# Patient Record
Sex: FEMALE | Race: WHITE | NOT HISPANIC OR LATINO | Employment: FULL TIME | URBAN - METROPOLITAN AREA
[De-identification: names, ages, dates, MRNs, and addresses within clinical notes are randomized per-mention and may not be internally consistent; named-entity substitution may affect disease eponyms.]

---

## 2017-04-12 ENCOUNTER — ALLSCRIPTS OFFICE VISIT (OUTPATIENT)
Dept: OTHER | Facility: OTHER | Age: 42
End: 2017-04-12

## 2017-04-27 ENCOUNTER — HOSPITAL ENCOUNTER (EMERGENCY)
Facility: HOSPITAL | Age: 42
Discharge: HOME | End: 2017-04-27
Attending: EMERGENCY MEDICINE
Payer: COMMERCIAL

## 2017-04-27 VITALS
TEMPERATURE: 98.3 F | DIASTOLIC BLOOD PRESSURE: 66 MMHG | WEIGHT: 293 LBS | SYSTOLIC BLOOD PRESSURE: 129 MMHG | RESPIRATION RATE: 18 BRPM | HEART RATE: 77 BPM | OXYGEN SATURATION: 98 %

## 2017-04-27 DIAGNOSIS — J02.9 PHARYNGITIS: Primary | ICD-10-CM

## 2017-04-27 DIAGNOSIS — J06.9 URI (UPPER RESPIRATORY INFECTION): ICD-10-CM

## 2017-04-27 PROCEDURE — 99282 EMERGENCY DEPT VISIT SF MDM: CPT

## 2017-04-27 RX ORDER — ACETAMINOPHEN 325 MG/1
975 TABLET ORAL ONCE
Status: COMPLETED | OUTPATIENT
Start: 2017-04-27 | End: 2017-04-27

## 2017-04-27 RX ORDER — CEFDINIR 300 MG/1
300 CAPSULE ORAL EVERY 12 HOURS
COMMUNITY
End: 2018-02-22

## 2017-04-27 RX ORDER — PROMETHAZINE HYDROCHLORIDE AND CODEINE PHOSPHATE 6.25; 1 MG/5ML; MG/5ML
5 SYRUP ORAL EVERY 6 HOURS PRN
Qty: 120 ML | Refills: 0 | Status: SHIPPED | OUTPATIENT
Start: 2017-04-27 | End: 2017-05-03

## 2017-04-27 RX ORDER — TRAMADOL HYDROCHLORIDE 50 MG/1
50 TABLET ORAL ONCE
Status: COMPLETED | OUTPATIENT
Start: 2017-04-27 | End: 2017-04-27

## 2017-04-27 RX ADMIN — TRAMADOL HYDROCHLORIDE 50 MG: 50 TABLET, COATED ORAL at 04:30

## 2017-04-27 RX ADMIN — ACETAMINOPHEN 975 MG: 325 TABLET ORAL at 04:30

## 2017-04-27 RX ADMIN — DEXAMETHASONE SODIUM PHOSPHATE 10 MG: 10 INJECTION, SOLUTION INTRAMUSCULAR; INTRAVENOUS at 04:32

## 2017-08-15 LAB
CREAT ?TM UR-SCNC: 129 UMOL/L
HBA1C MFR BLD HPLC: 8.4 %
MICROALBUMIN UR-MCNC: 1.4 MG/L (ref 0–20)
MICROALBUMIN/CREAT UR: 11 MG/G{CREAT}

## 2018-01-17 NOTE — MISCELLANEOUS
Provider Comments  Provider Comments:   PT WAS A NO SHOW FOR THE APPT      Signatures   Electronically signed by : Romana Prose, M D ; Apr 12 2017  3:13PM EST                       (Review)

## 2018-02-22 ENCOUNTER — OFFICE VISIT (OUTPATIENT)
Dept: ENDOCRINOLOGY | Facility: CLINIC | Age: 43
End: 2018-02-22
Payer: COMMERCIAL

## 2018-02-22 VITALS
SYSTOLIC BLOOD PRESSURE: 132 MMHG | DIASTOLIC BLOOD PRESSURE: 88 MMHG | HEIGHT: 68 IN | WEIGHT: 293 LBS | BODY MASS INDEX: 44.41 KG/M2

## 2018-02-22 DIAGNOSIS — E11.65 TYPE 2 DIABETES MELLITUS WITH HYPERGLYCEMIA, WITHOUT LONG-TERM CURRENT USE OF INSULIN (HCC): Primary | ICD-10-CM

## 2018-02-22 DIAGNOSIS — E11.65 TYPE 2 DIABETES MELLITUS WITH HYPERGLYCEMIA, UNSPECIFIED LONG TERM INSULIN USE STATUS: Primary | ICD-10-CM

## 2018-02-22 DIAGNOSIS — R03.0 ELEVATED BLOOD PRESSURE READING: ICD-10-CM

## 2018-02-22 DIAGNOSIS — E78.5 HYPERLIPIDEMIA, UNSPECIFIED HYPERLIPIDEMIA TYPE: ICD-10-CM

## 2018-02-22 DIAGNOSIS — E66.01 MORBID OBESITY (HCC): ICD-10-CM

## 2018-02-22 PROCEDURE — 99204 OFFICE O/P NEW MOD 45 MIN: CPT | Performed by: INTERNAL MEDICINE

## 2018-02-22 RX ORDER — EXENATIDE 250 UG/ML
INJECTION SUBCUTANEOUS
COMMUNITY
Start: 2018-02-20 | End: 2018-02-22

## 2018-02-22 NOTE — ASSESSMENT & PLAN NOTE
Patient counseled on complications of uncontrolled type 2 diabetes including retinopathy, nephropathy and neuropathy  I am going to refer her for diabetes education  For now continue metformin at current dose  Will discontinue Byetta and switch her to Bydureon once a week  If her insurance covers Bydureon she will let us know so we can set her up for pen teaching    Side effects discussed  Monitor blood sugar were twice a day and send worsened fingerstick log in 2 weeks

## 2018-02-22 NOTE — PROGRESS NOTES
Victor M Walker 43 y o  female MRN: [id number]    Encounter: [id number]      Assessment/Plan     Problem List Items Addressed This Visit     Type 2 diabetes mellitus with hyperglycemia, without long-term current use of insulin ([facility] ) - Primary     Patient counseled on complications of uncontrolled type 2 diabetes including retinopathy, nephropathy and neuropathy  I am going to refer her for diabetes education  For now continue metformin at current dose  Will discontinue Byetta and switch her to Bydureon once a week  If her insurance covers Bydureon she will let us know so we can set her up for pen teaching  Side effects discussed  Monitor blood sugar were twice a day and send worsened fingerstick log in 2 weeks         Relevant Medications    Exenatide ER (BYDUREON BCISE) 2 MG/0 85ML AUIJ    Other Relevant Orders    Comprehensive metabolic panel Lab Collect    Hemoglobin A1c Lab Collect    TSH, 3rd generation Lab Collect    T4, free Lab Collect    Ambulatory referral to Diabetic Education    Morbid obesity ([facility] )     Counseled her on metabolic complications of obesity, counseled on dietary and lifestyle modifications and weight loss  Given info on health nutrition technology weight management program   If she decides not to goal with that she will let me know so I can set her up  to see the nutritionist         Relevant Orders    Comprehensive metabolic panel Lab Collect    TSH, 3rd generation Lab Collect    T4, free Lab Collect    Hyperlipidemia     Triglycerides above goal, focus on dietary and lifestyle modifications and weight loss         Relevant Orders    Comprehensive metabolic panel Lab Collect    Elevated blood pressure reading     Blood pressure mildly elevated, will reassess next visit         Relevant Orders    Comprehensive metabolic panel Lab Collect          CC: Diabetes    History of Present Illness     HPI:  63-year-old woman referred here for evaluation of uncontrolled type 2 diabetes    She has had type 2 diabetes for the past 7 years  Denies any microvascular complications including retinopathy, nephropathy or neuropathy  Last eye exam was within the year  Currently on metformin and Byetta and has been tolerating without any side effects  Checks blood sugar once or twice a day and most of sugars range between 160s to 190s  Currently denies any polyuria, polydipsia, blurry vision  Denies any numbness or tingling in her feet  Review of Systems   Constitutional: Negative for fatigue, fever and unexpected weight change  Eyes: Negative for visual disturbance  Respiratory: Negative for cough and shortness of breath  Cardiovascular: Negative for chest pain, palpitations and leg swelling  Gastrointestinal: Negative for constipation, diarrhea, nausea and vomiting  Endocrine: Negative for polydipsia and polyuria  Musculoskeletal: Positive for arthralgias  Skin: Negative for color change, pallor, rash and wound  Psychiatric/Behavioral: Negative for sleep disturbance  All other systems reviewed and are negative        Historical Information   Past Medical History:   Diagnosis Date    Diabetes mellitus ([facility] )      Past Surgical History:   Procedure Laterality Date    KNEE ARTHROPLASTY Right      Social History   History   Alcohol Use    Yes     Comment: ocanssionally     History   Drug Use No     History   Smoking Status    Never Smoker   Smokeless Tobacco    Never Used     Family History:   Family History   Problem Relation Age of Onset    Diabetes unspecified Mother     Diabetes unspecified Sister     Diabetes unspecified Paternal Grandfather        Meds/Allergies   Current Outpatient Prescriptions   Medication Sig Dispense Refill    metFORMIN (GLUCOPHAGE) 1000 MG tablet Take 1,000 mg by mouth 2 (two) times a day with meals      Exenatide ER (BYDUREON BCISE) 2 MG/0 85ML AUIJ Inject 2 mg under the skin once a week 4 pen 3     No current facility-administered medications for this visit  Allergies   Allergen Reactions    Vicodin [Hydrocodone-Acetaminophen]        Objective   Vitals: Blood pressure 132/88, height 5' 8" (1 727 m), weight 134 kg (294 lb 8 oz)  Physical Exam   Constitutional: She is oriented to person, place, and time  She appears well-developed and well-nourished  No distress  HENT:   Head: Normocephalic and atraumatic  Mouth/Throat: No oropharyngeal exudate  Eyes: Conjunctivae and EOM are normal  No scleral icterus  Neck: Normal range of motion  Neck supple  No thyromegaly present  Cardiovascular: Normal rate, regular rhythm and normal heart sounds  No murmur heard  Pulses:       Dorsalis pedis pulses are 2+ on the right side, and 2+ on the left side  Posterior tibial pulses are 2+ on the right side, and 2+ on the left side  Pulmonary/Chest: Effort normal and breath sounds normal  No respiratory distress  She has no wheezes  She has no rales  Abdominal: Soft  Bowel sounds are normal  She exhibits no distension  There is no tenderness  There is no rebound  Musculoskeletal: Normal range of motion  She exhibits no edema or deformity  Feet:   Right Foot:   Skin Integrity: Negative for ulcer, skin breakdown, erythema, warmth, callus or dry skin  Left Foot:   Skin Integrity: Negative for ulcer, skin breakdown, erythema, warmth, callus or dry skin  Lymphadenopathy:     She has no cervical adenopathy  Neurological: She is oriented to person, place, and time  Skin: Skin is warm and dry  No rash noted  No erythema  No pallor  Psychiatric: She has a normal mood and affect  Her behavior is normal  Thought content normal    Patient's shoes and socks removed  Right Foot/Ankle   Right Foot Inspection  Skin Exam: skin normal and skin intact no dry skin, no warmth, no callus, no erythema, no maceration, no abnormal color, no pre-ulcer, no ulcer and no callus                          Toe Exam: ROM and strength within normal limits  Sensory Vibration: diminished  Proprioception: intact   Monofilament testing: intact  Vascular    The right DP pulse is 2+  The right PT pulse is 2+  Left Foot/Ankle  Left Foot Inspection  Skin Exam: skin normal and skin intactno dry skin, no warmth, no erythema, no maceration, normal color, no pre-ulcer, no ulcer and no callus                         Toe Exam: ROM and strength within normal limits                   Sensory   Vibration: diminished  Proprioception: intact  Monofilament: intact  Vascular    The left DP pulse is 2+  The left PT pulse is 2+  The history was obtained from the review of the chart, patient and family  Lab Results:      August [id number] metabolic panel shows a glucose of 176 otherwise within normal range, hemoglobin A1c 8 4, LDL 87, HDL 43, triglycerides 166  Urine microalbumino creatinine ratio 11          Portions of the record may have been created with voice recognition software  Occasional wrong word or "sound a like" substitutions may have occurred due to the inherent limitations of voice recognition software  Read the chart carefully and recognize, using context, where substitutions have occurred

## 2018-02-22 NOTE — ASSESSMENT & PLAN NOTE
Counseled her on metabolic complications of obesity, counseled on dietary and lifestyle modifications and weight loss    Given info on health nutrition technology weight management program   If she decides not to goal with that she will let me know so I can set her up  to see the nutritionist

## 2018-02-22 NOTE — LETTER
February 22, 2018     Saad Scott [address]     Patient: Ivone Stark   YOB: 1975   Date of Visit: 2/22/2018       Dear Dr Hal Canela:    Thank you for referring Ivone Stark to me for evaluation  Below are my notes for this consultation  If you have questions, please do not hesitate to call me  I look forward to following your patient along with you  Sincerely,        Amy Harrington MD        CC: No Recipients  Amy Harrington MD  2/22/2018 11:10 AM  Sign at close encounter   Ivone Stark 43 y o  female MRN: [id number]    Encounter: [id number]      Assessment/Plan     Problem List Items Addressed This Visit     Type 2 diabetes mellitus with hyperglycemia, without long-term current use of insulin ([facility] ) - Primary    Relevant Medications    Exenatide ER (BYDUREON BCISE) 2 MG/0 85ML AUIJ    Other Relevant Orders    Comprehensive metabolic panel Lab Collect    Hemoglobin A1c Lab Collect    TSH, 3rd generation Lab Collect    T4, free Lab Collect    Ambulatory referral to Diabetic Education    Morbid obesity ([facility] )    Relevant Orders    Comprehensive metabolic panel Lab Collect    TSH, 3rd generation Lab Collect    T4, free Lab Collect    Hyperlipidemia    Relevant Orders    Comprehensive metabolic panel Lab Collect    Elevated blood pressure reading    Relevant Orders    Comprehensive metabolic panel Lab Collect          CC: Diabetes    History of Present Illness     HPI:  70-year-old woman referred here for evaluation of uncontrolled type 2 diabetes  She has had type 2 diabetes for the past 7 years  Denies any microvascular complications including retinopathy, nephropathy or neuropathy  Last eye exam was within the year  Currently on metformin and Byetta and has been tolerating without any side effects  Checks blood sugar once or twice a day and most of sugars range between 160s to 190s  Currently denies any polyuria, polydipsia, blurry vision    Denies any numbness or tingling in her feet  Review of Systems   Constitutional: Negative for fatigue, fever and unexpected weight change  Eyes: Negative for visual disturbance  Respiratory: Negative for cough and shortness of breath  Cardiovascular: Negative for chest pain, palpitations and leg swelling  Gastrointestinal: Negative for constipation, diarrhea, nausea and vomiting  Endocrine: Negative for polydipsia and polyuria  Musculoskeletal: Positive for arthralgias  Skin: Negative for color change, pallor, rash and wound  Psychiatric/Behavioral: Negative for sleep disturbance  All other systems reviewed and are negative  Historical Information   Past Medical History:   Diagnosis Date    Diabetes mellitus ([facility] )      Past Surgical History:   Procedure Laterality Date    KNEE ARTHROPLASTY Right      Social History   History   Alcohol Use    Yes     Comment: ocanssionally     History   Drug Use No     History   Smoking Status    Never Smoker   Smokeless Tobacco    Never Used     Family History:   Family History   Problem Relation Age of Onset    Diabetes unspecified Mother     Diabetes unspecified Sister     Diabetes unspecified Paternal Grandfather        Meds/Allergies   Current Outpatient Prescriptions   Medication Sig Dispense Refill    metFORMIN (GLUCOPHAGE) 1000 MG tablet Take 1,000 mg by mouth 2 (two) times a day with meals      Exenatide ER (BYDUREON BCISE) 2 MG/0 85ML AUIJ Inject 2 mg under the skin once a week 4 pen 3     No current facility-administered medications for this visit  Allergies   Allergen Reactions    Vicodin [Hydrocodone-Acetaminophen]        Objective   Vitals: Blood pressure 132/88, height 5' 8" (1 727 m), weight 134 kg (294 lb 8 oz)  Physical Exam   Constitutional: She is oriented to person, place, and time  She appears well-developed and well-nourished  No distress  HENT:   Head: Normocephalic and atraumatic  Mouth/Throat: No oropharyngeal exudate  Eyes: Conjunctivae and EOM are normal  No scleral icterus  Neck: Normal range of motion  Neck supple  No thyromegaly present  Cardiovascular: Normal rate, regular rhythm and normal heart sounds  No murmur heard  Pulses:       Dorsalis pedis pulses are 2+ on the right side, and 2+ on the left side  Posterior tibial pulses are 2+ on the right side, and 2+ on the left side  Pulmonary/Chest: Effort normal and breath sounds normal  No respiratory distress  She has no wheezes  She has no rales  Abdominal: Soft  Bowel sounds are normal  She exhibits no distension  There is no tenderness  There is no rebound  Musculoskeletal: Normal range of motion  She exhibits no edema or deformity  Feet:   Right Foot:   Skin Integrity: Negative for ulcer, skin breakdown, erythema, warmth, callus or dry skin  Left Foot:   Skin Integrity: Negative for ulcer, skin breakdown, erythema, warmth, callus or dry skin  Lymphadenopathy:     She has no cervical adenopathy  Neurological: She is oriented to person, place, and time  Skin: Skin is warm and dry  No rash noted  No erythema  No pallor  Psychiatric: She has a normal mood and affect  Her behavior is normal  Thought content normal    Patient's shoes and socks removed  Right Foot/Ankle   Right Foot Inspection  Skin Exam: skin normal and skin intact no dry skin, no warmth, no callus, no erythema, no maceration, no abnormal color, no pre-ulcer, no ulcer and no callus                          Toe Exam: ROM and strength within normal limits  Sensory   Vibration: diminished  Proprioception: intact   Monofilament testing: intact  Vascular    The right DP pulse is 2+  The right PT pulse is 2+       Left Foot/Ankle  Left Foot Inspection  Skin Exam: skin normal and skin intactno dry skin, no warmth, no erythema, no maceration, normal color, no pre-ulcer, no ulcer and no callus                         Toe Exam: ROM and strength within normal limits Sensory   Vibration: diminished  Proprioception: intact  Monofilament: intact  Vascular    The left DP pulse is 2+  The left PT pulse is 2+  The history was obtained from the review of the chart, patient and family  Lab Results:      August [id number] metabolic panel shows a glucose of 176 otherwise within normal range, hemoglobin A1c 8 4, LDL 87, HDL 43, triglycerides 166  Urine microalbumino creatinine ratio 11          Portions of the record may have been created with voice recognition software  Occasional wrong word or "sound a like" substitutions may have occurred due to the inherent limitations of voice recognition software  Read the chart carefully and recognize, using context, where substitutions have occurred

## 2018-02-28 ENCOUNTER — OFFICE VISIT (OUTPATIENT)
Dept: DIABETES SERVICES | Facility: CLINIC | Age: 43
End: 2018-02-28
Payer: COMMERCIAL

## 2018-02-28 DIAGNOSIS — E11.65 TYPE 2 DIABETES MELLITUS WITH HYPERGLYCEMIA, WITHOUT LONG-TERM CURRENT USE OF INSULIN (HCC): ICD-10-CM

## 2018-02-28 PROCEDURE — G0108 DIAB MANAGE TRN  PER INDIV: HCPCS

## 2018-02-28 NOTE — PATIENT INSTRUCTIONS
Class Assessment AVS    You are scheduled to attend Living Well with Diabetes Classes starting: March 6 at [facility]  Please bring a copy of your blood sugar log and pen with you to class  Goal Blood Sugars:   Premeal , even better <110  2hr after a meal <180, even better <140  A1C <7%, even better <6 5%  Thank you for coming to the [facility] for education today  Please feel free to call with any questions or concerns      Meghan Perez RN  [address] [place] [address],  [address] [facility] [phone]

## 2018-02-28 NOTE — PROGRESS NOTES
Chief Complaint uncontrolled type 2 diabetes      HPI 10 years history of type 2 diabetes, appointment for class assesment and instruction in [facility]  Type 2 Diabetes Class Assessment    HPI: Met with Yesenia Pride for DSME Initial visit  Sagar Vigil has Type 2 Diabetes  Diabetes Assessment  Visit Type: Initial visit  Present at Session: patient   Medical Diagnosis/ICD 11:65  Special Learning Needs: No  Barriers to Learning: no barriers    How do you feel about making lifestyle changes at this time? ready  How would you rate your current knowledge of diabetes? fair  How confident are you that will be able to take better control of your diabetes?: good    How long have you had diabetes? 10 years  Have you had diabetes education in the pa1 time at different timesst?: Yes  Do you have any family members with diabetes?: Yes  Do you monitor your blood sugar? yes  Type of blood sugar monitor: generic meter  How old is your meter?: 3 years  How often do you test your blood sugars?:2  Do you keep a written record of your blood sugars? Yes   Blood sugar log with patient today and reviewed by educator?: No   Blood Sugar ranges:    Fastin - 180   Before meals: 160 or less   2 hours after meals: 160 -170  Any financial concerns pertaining to your diabetes supplies, medication or care?: No  Have you ever experienced hypoglycemia?:  No  Have you ever been hospitalized or gone to the ER due to your blood sugars?: No  How do you treat low blood sugars?: n/a  How do you treat high blood sugars? Careful of food intake  Do you wear a Diabetes I D ?: no  Where do you dispose of your sharps (needles,lancetes)?: send away    Ht Readings from Last 1 Encounters:   18 5' 8" (1 727 m)     Wt Readings from Last 1 Encounters:   18 134 kg (294 lb 8 oz)       bmi   Weight Change: No    Diet Assessment    Do you follow any special diet presently?: No  Who cooks at home?:  patient  Who does the grocery shopping?: patient   How frequently do you eat out?: 1    Activity Assessment    Exercise: walk, swim 3-4 days a week,  Starting yoga 2 - 3 days a week  Lifestyle/Social Assessment    Racial/ethnic group:                                       Primary Language: English  Marital Status:   Education Level: Bachelor's Degree   Work status: Full Time  Type of job and hours:40 hours,  M-F   Who lives in your household?: spouse  Who is you primary support person(s): spouse   Describe your quality of life currently?: good  Any concerns for your safety?: No  Any Baptist or cultural practices that may affect your diabetes care: No  Do you have a decrease or loss of hearing?: No  Do you have a decrease or loss of vision?: No  When was the last time you had an ophthalmology exam?: October 2017  When was the last time you had dental exam?: 1 year  Do you check your feet for cracks, sores, debris?: Yes  When was the last time you had podiatry or foot exam?: February 2018  Last flu shot?: November 2018  Pneumonia shot? no        No results found for: HGBA1C -  No results found for: CHOL, HDL, LDLCALC, TRIG  No results found for: Ofe Mishra      The patient's history was reviewed and updated as appropriate: allergies, current medications  Intervention    Diabetes Overview :   Narda Chavez was instructed on basic concepts of diabetes, including identifying role of diabetes self management, basic pathophysiology and types of diabetes, A1c and blood sugar targets  Narda Chavez has good understanding of material covered  Taking Medications: Instructed patient on action, side effects, efficacy, prescribed dosage and appropriate timing and frequency of administration of her diabetes medication  Narda Chavez has good understanding of material covered       Monitoring Blood Sugars  Instructions for Meter Teaching- Patient instructed in the following:  Site selection and skin preparation, Loading strips and lancet device, meter activation, obtaining blood sample, test strip and lancet disposal and recording log book entries  Patient has good understanding of material covered  Testing frequency: Encouraged pair testing  Test sugars before a meal and 2hr after the same meal, rotating between breakfast, lunch, and dinner  Recommend to test before and after largest meal to assess effect of carbohydrates on glucoes    Goal Blood Sugars:   Premeal , even better <110  2hr after a meal <180, even better <140  A1C <7%, even better <6 5%  Story Ernst Physical Activity: Discussed benefits of physical activity to optimize blood glucose control, encouraged activity at patient is physically able  Always consult a physician prior to starting an exercise program   Comments: Onelia Adams verbalizes understanding of hypoglycemia concepts      Bydureon Education    Met with Baldomero elder [facility]  Onelia Adams was instructed on site selection and rotation; safe needle disposal; medication storage; side effects and precautions of Bydureon  Discused primary side effects of nausea/vomiting, and risk of pancreatitis  Patient knows to call their doctor with abdominal pain  Onelia Adams understands this is a once a week injection and should be taken the same day every week  Suggested marking the calendar for injection day for the first few weeks so the medication is not forgotten  We completed training with demo pens, Onelia Adams demonstrated proper pen usage in office today  Instructed that steady states of the medication can be up to 6 weeks, therefore Onelia Adams may not notice much change in blood sugars during the first few weeks, this is normal  Discussed that it can be normal to feel a small ball/lump under the skin where injection occurred for a few weeks, this is normal as well  Onelia Adams verbalized understanding of material covered today, knows to call with any questions or concerns           Diabetes Education Record: Onelia Adams was given the following education material: [employer] from the company  Class schedule  Patient response to instruction  Comprehension: good  Motivation: good  Expected Compliance: good  Readiness: action  Barriers to Learning: none known         Thank you for referring your patient to [facility], it was a pleasure working with them today  Please feel free to call with any questions or concerns      July Grullon RN, [address],  [address] [facility] [phone]

## 2018-03-02 ENCOUNTER — TELEPHONE (OUTPATIENT)
Dept: ENDOCRINOLOGY | Facility: CLINIC | Age: 43
End: 2018-03-02

## 2018-03-02 DIAGNOSIS — E11.65 TYPE 2 DIABETES MELLITUS WITH HYPERGLYCEMIA, WITHOUT LONG-TERM CURRENT USE OF INSULIN (HCC): ICD-10-CM

## 2018-03-02 RX ORDER — EXENATIDE 2 MG/.85ML
2 INJECTION, SUSPENSION, EXTENDED RELEASE SUBCUTANEOUS WEEKLY
Qty: 4 PEN | Refills: 1 | Status: CANCELLED | OUTPATIENT
Start: 2018-03-02

## 2018-03-02 RX ORDER — EXENATIDE 2 MG/.85ML
2 INJECTION, SUSPENSION, EXTENDED RELEASE SUBCUTANEOUS WEEKLY
Qty: 4 PEN | Refills: 0 | Status: CANCELLED | OUTPATIENT
Start: 2018-03-02

## 2018-03-06 ENCOUNTER — OFFICE VISIT (OUTPATIENT)
Dept: DIABETES SERVICES | Facility: CLINIC | Age: 43
End: 2018-03-06
Payer: COMMERCIAL

## 2018-03-06 DIAGNOSIS — E11.65 TYPE 2 DIABETES MELLITUS WITH HYPERGLYCEMIA, WITHOUT LONG-TERM CURRENT USE OF INSULIN (HCC): ICD-10-CM

## 2018-03-06 PROCEDURE — G0109 DIAB MANAGE TRN IND/GROUP: HCPCS

## 2018-03-07 NOTE — PROGRESS NOTES
Living Well with Diabetes Group Class #1    Soumya Olivo attended the Living Well with Diabetes Group Class #1  Topics Covered in class today include: What is diabetes; Types of Diabetes; How Diabetes is diagnosed; Management skills; the role of exercise in blood sugar managements, Home glucose monitoring, and target ranges  Jesu Monsivais participated in group activities    The patient's history was reviewed and updated as appropriate: allergies, current medications  No results found for: HGBA1C    Diabetes Education Record  Jesu Monsivais was provided Living Well with Diabetes Class #1 book      Patient response to instruction    Comprehensiongood  Motivationgood  Expected Compliancegood    Begin Time: 6 pm  End Time: 8 pm  Referring Provider: Dr Verónica Somers you for referring your patient to [facility], it was a pleasure working with them today  Please feel free to call with any questions or concerns      Sheron Newell RN  [facility] [address] [address]

## 2018-03-13 ENCOUNTER — OFFICE VISIT (OUTPATIENT)
Dept: DIABETES SERVICES | Facility: CLINIC | Age: 43
End: 2018-03-13
Payer: COMMERCIAL

## 2018-03-13 DIAGNOSIS — E11.65 TYPE 2 DIABETES MELLITUS WITH HYPERGLYCEMIA, WITHOUT LONG-TERM CURRENT USE OF INSULIN (HCC): Primary | ICD-10-CM

## 2018-03-13 PROCEDURE — G0109 DIAB MANAGE TRN IND/GROUP: HCPCS | Performed by: DIETITIAN, REGISTERED

## 2018-03-14 NOTE — PROGRESS NOTES
Living Well with Diabetes Group Class #2    Siddharth Narvaezs attended the Living Well with Diabetes Group Class #2  During class, Ryan Brooks was instructed on the following topics: Macronutrients, Carbohydrate sources, What one serving of carbohydrate equals, effects of diet on blood glucose levels, effect of carbohydrates on blood glucose levels, basics of meal planning: balance, portions, meal times, measurements, reading food labels to determine carbohydrates  Ryan Brooks participated in group activities of reading labels together and completing the meal planning activity  Jaycristoefr Brooks will follow up with class #3  Will call with any questions or concerns prior to next session  The patient's history was reviewed and updated as appropriate: allergies, current medications  No results found for: HGBA1C    Diabetes Education Record  Ryan Brooks was provided Living Well with Diabetes Class #2 book      Patient response to instruction    Comprehensiongood  Motivationgood  Expected Compliancegood    Thank you for referring your patient to [facility], it was a pleasure working with them today  Please feel free to call with any questions or concerns      Billy KAM [facility] [phone]

## 2018-03-27 ENCOUNTER — OFFICE VISIT (OUTPATIENT)
Dept: DIABETES SERVICES | Facility: CLINIC | Age: 43
End: 2018-03-27
Payer: COMMERCIAL

## 2018-03-27 DIAGNOSIS — E11.65 TYPE 2 DIABETES MELLITUS WITH HYPERGLYCEMIA, WITHOUT LONG-TERM CURRENT USE OF INSULIN (HCC): Primary | ICD-10-CM

## 2018-03-27 PROCEDURE — G0109 DIAB MANAGE TRN IND/GROUP: HCPCS | Performed by: DIETITIAN, REGISTERED

## 2018-03-28 NOTE — PROGRESS NOTES
Living Well with Diabetes Group Class #4    Dori Pantoja attended the Living Well with Diabetes Group Class #4  During class, Saulo Dudley was instructed on the following topics: Types of cholesterol, dietary sources of cholesterol, types of fat, types of fiber, reading food labels- in depth, healthy choices when dining out  Saulo Dudley participated in group activities of reading labels together and completed the dining out activity  Saulo Dudley will follow up with class #5 or additional DSMT/MNT as desired  Will call with any questions or concerns prior to next session  The patient's history was reviewed and updated as appropriate: allergies, current medications  No results found for: HGBA1C    Diabetes Education Record  Saulo Luis Enrique was provided Living Well with Diabetes Class #4 book      Patient response to instruction    Comprehensiongood  Motivationgood  Expected Compliancegood    Thank you for referring your patient to [facility], it was a pleasure working with them today  Please feel free to call with any questions or concerns      Jabari Moraes RD [facility] [phone]

## 2018-04-05 ENCOUNTER — OFFICE VISIT (OUTPATIENT)
Dept: ENDOCRINOLOGY | Facility: CLINIC | Age: 43
End: 2018-04-05
Payer: COMMERCIAL

## 2018-04-05 ENCOUNTER — TELEPHONE (OUTPATIENT)
Dept: ENDOCRINOLOGY | Facility: CLINIC | Age: 43
End: 2018-04-05

## 2018-04-05 VITALS
HEIGHT: 68 IN | SYSTOLIC BLOOD PRESSURE: 134 MMHG | BODY MASS INDEX: 44.1 KG/M2 | HEART RATE: 72 BPM | DIASTOLIC BLOOD PRESSURE: 84 MMHG | WEIGHT: 291 LBS

## 2018-04-05 DIAGNOSIS — E11.65 TYPE 2 DIABETES MELLITUS WITH HYPERGLYCEMIA, WITHOUT LONG-TERM CURRENT USE OF INSULIN (HCC): Primary | ICD-10-CM

## 2018-04-05 DIAGNOSIS — E66.01 MORBID OBESITY (HCC): ICD-10-CM

## 2018-04-05 DIAGNOSIS — E11.65 TYPE 2 DIABETES MELLITUS WITH HYPERGLYCEMIA, WITHOUT LONG-TERM CURRENT USE OF INSULIN (HCC): ICD-10-CM

## 2018-04-05 DIAGNOSIS — R03.0 ELEVATED BLOOD PRESSURE READING: ICD-10-CM

## 2018-04-05 DIAGNOSIS — E78.5 HYPERLIPIDEMIA, UNSPECIFIED HYPERLIPIDEMIA TYPE: ICD-10-CM

## 2018-04-05 DIAGNOSIS — R03.0 ELEVATED BP WITHOUT DIAGNOSIS OF HYPERTENSION: ICD-10-CM

## 2018-04-05 LAB — HBA1C MFR BLD HPLC: 8.3 %

## 2018-04-05 PROCEDURE — 99214 OFFICE O/P EST MOD 30 MIN: CPT | Performed by: INTERNAL MEDICINE

## 2018-04-05 RX ORDER — BLOOD SUGAR DIAGNOSTIC
1 STRIP MISCELLANEOUS 2 TIMES DAILY
Qty: 60 EACH | Refills: 5 | Status: SHIPPED | OUTPATIENT
Start: 2018-04-05 | End: 2018-07-06 | Stop reason: SDUPTHER

## 2018-04-05 NOTE — PROGRESS NOTES
Parish Gibson 43 y o  female MRN: [id number]    Encounter: [id number]      Assessment/Plan     Problem List Items Addressed This Visit     Type 2 diabetes mellitus with hyperglycemia, without long-term current use of insulin ([facility] ) - Primary     She had labs done earlier today and results not available yet , will followup   Continue current meds , focus on dietary and lifestyle modifications and weight loss         Relevant Medications    ONETOUCH DELICA LANCETS FINE MISC    Morbid obesity ([facility] )     Set up appointment with HNT weight management program         Hyperlipidemia     F/u on fasting lipids          Elevated blood pressure reading    Elevated BP without diagnosis of hypertension     bp above goal, focus on dietary modifications and reassess next visit             CC: Diabetes    History of Present Illness     HPI: 42 y/o woman with history of type 2 DM, HLD and obesity here for followup  Type 2 DM - currently on metformin and bydureon -  Checking f s twice daily - fasting 160-180s  premeal -140-150s   Bedtime -160-180  Lost 3 lbs since last visit   Denies polyuria, polydypsia ,blurry vision , numbness and tingling in feet  No GI SE            Review of Systems   Constitutional: Negative for activity change, fatigue and unexpected weight change  Eyes: Negative for visual disturbance  Respiratory: Negative for cough and shortness of breath  Gastrointestinal: Negative for abdominal pain, constipation, diarrhea, nausea and vomiting  Endocrine: Negative for polydipsia and polyuria  Skin: Negative for color change, pallor, rash and wound  Psychiatric/Behavioral: Negative for sleep disturbance  The patient is not nervous/anxious  All other systems reviewed and are negative        Historical Information   Past Medical History:   Diagnosis Date    Diabetes mellitus ([facility] )      Past Surgical History:   Procedure Laterality Date    KNEE ARTHROPLASTY Right      Social History   History   Alcohol Use  Yes     Comment: ocanssionally     History   Drug Use No     History   Smoking Status    Never Smoker   Smokeless Tobacco    Never Used     Family History:   Family History   Problem Relation Age of Onset    Diabetes unspecified Mother     Diabetes unspecified Sister     Diabetes unspecified Paternal Grandfather        Meds/Allergies   Current Outpatient Prescriptions   Medication Sig Dispense Refill    Exenatide ER (BYDUREON BCISE) 2 MG/0 85ML AUIJ Inject 2 mg under the skin once a week 4 pen 3    metFORMIN (GLUCOPHAGE) 1000 MG tablet Take 1,000 mg by mouth 2 (two) times a day with meals      ONETOUCH DELICA LANCETS FINE MISC Check twice daily 100 each 3    ONETOUCH VERIO test strip 1 each by Other route 2 (two) times a day Use as instructed 60 each 5     No current facility-administered medications for this visit  Allergies   Allergen Reactions    Vicodin [Hydrocodone-Acetaminophen]        Objective   Vitals: Blood pressure 134/84, pulse 72, height 5' 8" (1 727 m), weight 132 kg (291 lb)  Physical Exam   Constitutional: She is oriented to person, place, and time  She appears well-developed and well-nourished  No distress  HENT:   Head: Normocephalic and atraumatic  Mouth/Throat: No oropharyngeal exudate  Eyes: Conjunctivae and EOM are normal  No scleral icterus  Neck: Neck supple  No thyromegaly present  Cardiovascular: Normal rate, regular rhythm and normal heart sounds  No murmur heard  Pulmonary/Chest: Effort normal and breath sounds normal  No respiratory distress  She has no wheezes  She has no rales  Abdominal: Soft  Bowel sounds are normal  She exhibits no distension  There is no tenderness  There is no rebound and no guarding  Musculoskeletal: Normal range of motion  She exhibits no edema or deformity  Lymphadenopathy:     She has no cervical adenopathy  Neurological: She is alert and oriented to person, place, and time  Skin: Skin is warm and dry   No rash noted  No erythema  No pallor  Psychiatric: She has a normal mood and affect  Her behavior is normal  Judgment and thought content normal        The history was obtained from the review of the chart, patient  Portions of the record may have been created with voice recognition software  Occasional wrong word or "sound a like" substitutions may have occurred due to the inherent limitations of voice recognition software  Read the chart carefully and recognize, using context, where substitutions have occurred

## 2018-04-06 NOTE — ASSESSMENT & PLAN NOTE
She had labs done earlier today and results not available yet , will followup     Continue current meds , focus on dietary and lifestyle modifications and weight loss

## 2018-04-19 ENCOUNTER — OFFICE VISIT (OUTPATIENT)
Dept: DIABETES SERVICES | Facility: CLINIC | Age: 43
End: 2018-04-19
Payer: COMMERCIAL

## 2018-04-19 DIAGNOSIS — E11.65 TYPE 2 DIABETES MELLITUS WITH HYPERGLYCEMIA, WITHOUT LONG-TERM CURRENT USE OF INSULIN (HCC): Primary | ICD-10-CM

## 2018-04-19 PROCEDURE — G0109 DIAB MANAGE TRN IND/GROUP: HCPCS | Performed by: DIETITIAN, REGISTERED

## 2018-04-20 NOTE — PROGRESS NOTES
aLiving Well with Diabetes Group Class #5    Delfin Remedies attended the Living Well with Diabetes Group Class #5  During class, Rabia Bloom was instructed on the following topics:Whole grains, cooking demo and taste testing  Rabia Bloom participated in group activities during the whole grain cooking demo  Rabia Bloom will follow up with DSME/MNT as desired  Will call with any questions or concerns prior to next session  The patient's history was reviewed and updated as appropriate: allergies, current medications  No results found for: HGBA1C    Diabetes Education Record  Rabia Bloom was provided Living Well with Diabetes Class #5 book      Patient response to instruction    Comprehensiongood  Motivationgood  Expected Compliancegood      Thank you for referring your patient to [facility], it was a pleasure working with them today  Please feel free to call with any questions or concerns      Herman Hunter RD E  [facility] [phone]

## 2018-05-18 ENCOUNTER — TELEPHONE (OUTPATIENT)
Dept: ENDOCRINOLOGY | Facility: CLINIC | Age: 43
End: 2018-05-18

## 2018-05-18 NOTE — TELEPHONE ENCOUNTER
----- Message from Priscila Jovel MD sent at 5/18/2018  1:33 PM EDT -----  Please call the patient regarding her abnormal result  A1c high 8 3-sent over fingerstick log in 2 weeks    TSH is slightly elevated, will repeat on next visit

## 2018-05-18 NOTE — PROGRESS NOTES
Please call the patient regarding her abnormal result  A1c high 8 3-sent over fingerstick log in 2 weeks    TSH is slightly elevated, will repeat on next visit

## 2018-06-08 ENCOUNTER — TELEPHONE (OUTPATIENT)
Dept: OBGYN CLINIC | Facility: HOSPITAL | Age: 43
End: 2018-06-08

## 2018-07-05 NOTE — PROGRESS NOTES
Established Patient Progress Note      Chief Complaint   Patient presents with    Diabetes Type 2          History of Present Illness:   Dre Shabazz is a 37 y o  female with a history of HLD and type 2 diabetes without long term use of insulin  Reports no complications of diabetes  Last A1C 8 3  Review of BG log shows BG have been ranging from 110-180s  She reports she has been off her Bydureon for about two weeks due to being out of refills  Denies recent illness or hospitalizations  Denies recent severe hypoglycemic or severe hyperglycemic episodes  Denies any issues with her current regimen  Home glucose monitoring: are performed regularly      Current regimen: Bydureon 2 mg (has been off due to being out of refills) and Metformin 1,000 mg BID  compliant all of the timedenies any side effects from current medications     Hypoglycemic episodes: No never   H/o of hypoglycemia causing hospitalization or Intervention such as glucagon injection or ambulance call No     Last Eye Exam: yearly, no retinopathy   Last Foot Exam: does not see podiatrist     Has hyperlipidemia: not currently on medication     Patient Active Problem List   Diagnosis    Type [facility] with hyperglycemia, without long[facility]term [facility]    [facility]    Hyperlipidemia    Elevated blood pressure reading    Elevated BP without diagnosis of hypertension      Past Medical History:   Diagnosis Date    Diabetes mellitus ([facility] )       Past Surgical History:   Procedure Laterality Date    KNEE ARTHROPLASTY Right       Family History   Problem Relation Age of Onset    Diabetes unspecified Mother     Diabetes unspecified Sister     Diabetes unspecified Paternal Grandfather      Social History   Substance Use Topics    Smoking status: Never Smoker    Smokeless tobacco: Never Used    Alcohol use Yes      Comment: ocanssionally     Allergies   Allergen Reactions    Vicodin [Hydrocodone-Acetaminophen] Current Outpatient Prescriptions:     Exenatide ER (BYDUREON BCISE) 2 MG/0 85ML AUIJ, Inject 2 mg under the skin once a week, Disp: 4 pen, Rfl: 6    metFORMIN (GLUCOPHAGE) 1000 MG tablet, Take 1 tablet (1,000 mg total) by mouth 2 (two) times a day with meals, Disp: 60 tablet, Rfl: 6    ONETOUCH DELICA LANCETS FINE MISC, Check twice daily, Disp: 100 each, Rfl: 6    ONETOUCH VERIO test strip, 1 each by Other route 2 (two) times a day Use as instructed, Disp: 60 each, Rfl: 6    Review of Systems   Constitutional: Negative for chills and fever  HENT: Negative for sore throat and trouble swallowing  Eyes: Negative for visual disturbance  Respiratory: Negative for shortness of breath  Cardiovascular: Negative for chest pain and palpitations  Gastrointestinal: Negative for abdominal pain, constipation and diarrhea  Endocrine: Negative for cold intolerance, heat intolerance, polydipsia, polyphagia and polyuria  Genitourinary: Negative for frequency  Musculoskeletal: Negative for arthralgias and myalgias  Skin: Negative for rash  Neurological: Negative for dizziness and tremors  Hematological: Negative for adenopathy  Psychiatric/Behavioral: Negative for sleep disturbance  All other systems reviewed and are negative  Physical Exam:  Body mass index is 43 56 kg/m²  /82   Pulse 87   Ht 5' 8" (1 727 m)   Wt 130 kg (286 lb 8 oz)   BMI 43 56 kg/m²    Wt Readings from Last 3 Encounters:   07/06/18 130 kg (286 lb 8 oz)   04/05/18 132 kg (291 lb)   02/22/18 134 kg (294 lb 8 oz)       Physical Exam   Constitutional: She is oriented to person, place, and time  She appears well-developed and well-nourished  No distress  HENT:   Head: Normocephalic and atraumatic  Eyes: Conjunctivae are normal  Pupils are equal, round, and reactive to light  Neck: Normal range of motion  Neck supple  No thyromegaly present     Cardiovascular: Normal rate, regular rhythm and normal heart sounds  Pulses are no weak pulses  Pulses:       Dorsalis pedis pulses are 2+ on the right side, and 2+ on the left side  Pulmonary/Chest: Effort normal and breath sounds normal  No respiratory distress  She has no wheezes  She has no rales  Abdominal: Soft  Bowel sounds are normal  She exhibits no distension  There is no tenderness  Musculoskeletal: Normal range of motion  She exhibits no edema  Feet:   Right Foot:   Skin Integrity: Negative for ulcer, skin breakdown, erythema, warmth, callus or dry skin  Left Foot:   Skin Integrity: Negative for ulcer, skin breakdown, erythema, warmth, callus or dry skin  Neurological: She is alert and oriented to person, place, and time  Skin: Skin is warm and dry  Psychiatric: She has a normal mood and affect  Vitals reviewed  Patient's shoes and socks removed  Right Foot/Ankle   Right Foot Inspection  Skin Exam: skin normal skin not intact, no dry skin, no warmth, no callus, no erythema, no maceration, no abnormal color, no pre-ulcer, no ulcer and no callus                            Sensory       Monofilament testing: intact  Vascular    The right DP pulse is 2+  Left Foot/Ankle  Left Foot Inspection  Skin Exam: skin normalskin not intact, no dry skin, no warmth, no erythema, no maceration, normal color, no pre-ulcer, no ulcer and no callus                                         Sensory       Monofilament: intact  Vascular    The left DP pulse is 2+  Assign Risk Category:  No deformity present; No loss of protective sensation; No weak pulses       Risk: 0      Labs:     Component      Latest Ref Rng & Units 4/5/2018   EXT Hemoglobin A1C       8 3         Impression & Plan:    Problem List Items Addressed This Visit     Type 2 diabetes mellitus with hyperglycemia, without long-term current use of insulin ([facility]CC) - Primary     A1C not at goal, she has been off her Bydureon  Refills sent and intstructed to resume the Bydureon   Instructed to check BG 2x per day at alternating times of day and send readings into the office in two weeks  Referral given to podiatrist  Mariela Lancaster on complications of uncontrolled diabetes including; retinopathy, neuropathy, nephropathy, heart attack, and stroke  Check A1C, cmp, and microalbumin  Relevant Medications    Exenatide ER (BYDUREON BCISE) 2 MG/0 85ML AUIJ    metFORMIN (GLUCOPHAGE) 1000 MG tablet    ONETOUCH DELICA LANCETS FINE MISC    ONETOUCH VERIO test strip    Other Relevant Orders    Ambulatory referral to [address] [place] [address] [address] and [employer]    Ambulatory referral to Podiatry    Hemoglobin A1C    Comprehensive metabolic panel    Microalbumin / creatinine urine ratio    T4, free    TSH, 3rd generation    Hyperlipidemia     Check lipid panel          Relevant Orders    Lipid Panel with Direct LDL reflex          Orders Placed This Encounter   Procedures    Hemoglobin A1C    Comprehensive metabolic panel     This is a patient instruction: Patient fasting for 8 hours or longer recommended   Lipid Panel with Direct LDL reflex     This is a patient instruction: This test requires patient fasting for 10-12 hours or longer  Drinking of black coffee or black tea is acceptable   Microalbumin / creatinine urine ratio    T4, free    TSH, 3rd generation     This is a patient instruction: This test is non-fasting  Please drink two glasses of water morning of bloodwork         Ambulatory referral to [facility] [address] [place] [address] [address] and [employer]     Standing Status:   Future     Standing Expiration Date:   1/6/2019     Referral Priority:   Routine     Referral Type:   Program     Referral Reason:   Specialty Services Required     Number of Visits Requested:   1     Expiration Date:   7/6/2019    Ambulatory referral to Podiatry     Standing Status:   Future     Standing Expiration Date:   1/6/2019     Referral Priority:   Routine     Referral Type:   Consult - AMB     Referral Reason:   Specialty Services Required     Referred to Provider:   Alirio Grier DPM     Requested Specialty:   Podiatry     Number of Visits Requested:   1     Expiration Date:   7/6/2019         Discussed with the patient and all questioned fully answered  She will call me if any problems arise  Follow-up appointment in 3 months       Counseled patient on diagnostic results, prognosis, risk and benefit of treatment options, instruction for management, importance of treatment compliance, Risk  factor reduction and impressions      Fabio [address]

## 2018-07-06 ENCOUNTER — OFFICE VISIT (OUTPATIENT)
Dept: ENDOCRINOLOGY | Facility: CLINIC | Age: 43
End: 2018-07-06
Payer: COMMERCIAL

## 2018-07-06 VITALS
HEART RATE: 87 BPM | HEIGHT: 68 IN | BODY MASS INDEX: 43.42 KG/M2 | WEIGHT: 286.5 LBS | SYSTOLIC BLOOD PRESSURE: 140 MMHG | DIASTOLIC BLOOD PRESSURE: 82 MMHG

## 2018-07-06 DIAGNOSIS — E78.5 HYPERLIPIDEMIA, UNSPECIFIED HYPERLIPIDEMIA TYPE: ICD-10-CM

## 2018-07-06 DIAGNOSIS — E11.65 TYPE 2 DIABETES MELLITUS WITH HYPERGLYCEMIA, WITHOUT LONG-TERM CURRENT USE OF INSULIN (HCC): Primary | ICD-10-CM

## 2018-07-06 PROCEDURE — 99213 OFFICE O/P EST LOW 20 MIN: CPT | Performed by: NURSE PRACTITIONER

## 2018-07-06 RX ORDER — BLOOD SUGAR DIAGNOSTIC
1 STRIP MISCELLANEOUS 2 TIMES DAILY
Qty: 60 EACH | Refills: 6 | Status: SHIPPED | OUTPATIENT
Start: 2018-07-06 | End: 2019-01-14 | Stop reason: SDUPTHER

## 2018-07-06 NOTE — ASSESSMENT & PLAN NOTE
A1C not at goal, she has been off her Bydureon  Refills sent and intstructed to resume the Bydureon  Instructed to check BG 2x per day at alternating times of day and send readings into the office in two weeks  Referral given to podiatrist  Subhash Valadez on complications of uncontrolled diabetes including; retinopathy, neuropathy, nephropathy, heart attack, and stroke  Check A1C, cmp, and microalbumin

## 2018-07-30 ENCOUNTER — OFFICE VISIT (OUTPATIENT)
Dept: OBGYN CLINIC | Facility: CLINIC | Age: 43
End: 2018-07-30
Payer: COMMERCIAL

## 2018-07-30 ENCOUNTER — APPOINTMENT (OUTPATIENT)
Dept: RADIOLOGY | Facility: CLINIC | Age: 43
End: 2018-07-30
Payer: COMMERCIAL

## 2018-07-30 VITALS
SYSTOLIC BLOOD PRESSURE: 144 MMHG | BODY MASS INDEX: 43.07 KG/M2 | HEART RATE: 91 BPM | HEIGHT: 68 IN | WEIGHT: 284.2 LBS | DIASTOLIC BLOOD PRESSURE: 96 MMHG

## 2018-07-30 DIAGNOSIS — R52 PAIN: ICD-10-CM

## 2018-07-30 DIAGNOSIS — R52 PAIN: Primary | ICD-10-CM

## 2018-07-30 PROCEDURE — 99203 OFFICE O/P NEW LOW 30 MIN: CPT | Performed by: ORTHOPAEDIC SURGERY

## 2018-07-30 PROCEDURE — 20612 ASPIRATE/INJ GANGLION CYST: CPT | Performed by: ORTHOPAEDIC SURGERY

## 2018-07-30 PROCEDURE — 73130 X-RAY EXAM OF HAND: CPT

## 2018-07-30 RX ORDER — BETAMETHASONE SODIUM PHOSPHATE AND BETAMETHASONE ACETATE 3; 3 MG/ML; MG/ML
6 INJECTION, SUSPENSION INTRA-ARTICULAR; INTRALESIONAL; INTRAMUSCULAR; SOFT TISSUE
Status: COMPLETED | OUTPATIENT
Start: 2018-07-30 | End: 2018-07-30

## 2018-07-30 RX ADMIN — BETAMETHASONE SODIUM PHOSPHATE AND BETAMETHASONE ACETATE 6 MG: 3; 3 INJECTION, SUSPENSION INTRA-ARTICULAR; INTRALESIONAL; INTRAMUSCULAR; SOFT TISSUE at 14:37
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
## 2018-08-14 LAB
CREAT ?TM UR-SCNC: 145 UMOL/L
HBA1C MFR BLD HPLC: 6.8 %
MICROALBUMIN UR-MCNC: 1.6 MG/L (ref 0–20)
MICROALBUMIN/CREAT UR: 11 MG/G{CREAT}

## 2018-08-15 ENCOUNTER — TELEPHONE (OUTPATIENT)
Dept: ENDOCRINOLOGY | Facility: CLINIC | Age: 43
End: 2018-08-15

## 2018-10-10 ENCOUNTER — OFFICE VISIT (OUTPATIENT)
Dept: ENDOCRINOLOGY | Facility: CLINIC | Age: 43
End: 2018-10-10
Payer: COMMERCIAL

## 2018-10-10 VITALS
DIASTOLIC BLOOD PRESSURE: 82 MMHG | BODY MASS INDEX: 42.59 KG/M2 | WEIGHT: 281 LBS | SYSTOLIC BLOOD PRESSURE: 132 MMHG | HEART RATE: 72 BPM | HEIGHT: 68 IN

## 2018-10-10 DIAGNOSIS — E78.5 HYPERLIPIDEMIA, UNSPECIFIED HYPERLIPIDEMIA TYPE: ICD-10-CM

## 2018-10-10 DIAGNOSIS — E11.65 TYPE 2 DIABETES MELLITUS WITH HYPERGLYCEMIA, WITHOUT LONG-TERM CURRENT USE OF INSULIN (HCC): Primary | ICD-10-CM

## 2018-10-10 DIAGNOSIS — R79.89 ELEVATED TSH: ICD-10-CM

## 2018-10-10 PROCEDURE — 99213 OFFICE O/P EST LOW 20 MIN: CPT | Performed by: NURSE PRACTITIONER

## 2018-10-10 NOTE — ASSESSMENT & PLAN NOTE
TSH slightly elevated with normal free T4, denies symptoms hypo or hyperthyroidism  She denies family history of thyroid disorders  Repeat TSH and free T4 with next set of labs

## 2018-10-10 NOTE — ASSESSMENT & PLAN NOTE
A1C close to goal, fasting morning BG slightly elevated  Continue current regimen for now and focus on dietary and lifestyle modifications

## 2018-10-10 NOTE — PROGRESS NOTES
Established Patient Progress Note      Chief Complaint   Patient presents with    Diabetes Type 2          History of Present Illness:   Ivone Stark is a 37 y o  female with a history of HLD and type 2 diabetes without long term use of insulin  Reports no complications of diabetes  Last A1C 6 8  She did not bring BG log or meter to today's visit  She reports she has been attending [employer] and has been working on losing weight  She reports her scale at home has her at 274 lbs  Denies recent illness or hospitalizations  Denies recent severe hypoglycemic or severe hyperglycemic episodes  Denies any issues with her current regimen  Home glucose monitoring: are performed regularly    Home blood glucose readings:   Before breakfast: 140-170s  Before lunch: does not check   Before dinner: 100-120s  Bedtime: 100-120s    Current regimen: Metformin 1,000 mg BID and Bydureon BCISE 2 mg weekly   compliant all of the timedenies any side effects from current medications     Hypoglycemic episodes: No never   H/o of hypoglycemia causing hospitalization or Intervention such as glucagon injection or ambulance call No     Last Eye Exam: yearly, no retinopathy   Last Foot Exam: does not see podiatrist     Has hyperlipidemia: not currently on medication      Patient Active Problem List   Diagnosis    Type 2 diabetes mellitus with hyperglycemia, without long-term current use of insulin ([address] )    Morbid obesity [facility] [address] [facility]    Hyperlipidemia    Elevated blood pressure reading    Elevated BP without diagnosis of hypertension    Elevated TSH      Past Medical History:   Diagnosis Date    Diabetes mellitus ([facility] [address] )       Past Surgical History:   Procedure Laterality Date    HAND SURGERY  09/2014    KNEE ARTHROPLASTY Right       Family History   Problem Relation Age of Onset    Diabetes unspecified Mother     Diabetes Mother     Diabetes unspecified Sister     Diabetes unspecified Paternal Grandfather     Diabetes Sister Social History   Substance Use Topics    Smoking status: Never Smoker    Smokeless tobacco: Never Used    Alcohol use Yes      Comment: Once in a blue moon     Allergies   Allergen Reactions    Vicodin [Hydrocodone-Acetaminophen]          Current Outpatient Prescriptions:     Exenatide ER (BYDUREON BCI) 2 MG/0 85ML AUIJ, Inject 2 mg under the skin once a week, Disp: 4 pen, Rfl: 6    metFORMIN (GLUCOPHAGE) 1000 MG tablet, Take 1 tablet (1,000 mg total) by mouth 2 (two) times a day with meals, Disp: 60 tablet, Rfl: 6    ONETOUCH DELICA LANCETS FINE MISC, Check twice daily, Disp: 100 each, Rfl: 6    ONETOUCH VERIO test strip, 1 each by Other route 2 (two) times a day Use as instructed, Disp: 60 each, Rfl: 6    Review of Systems   Constitutional: Negative for chills and fever  HENT: Negative for sore throat and trouble swallowing  Eyes: Negative for visual disturbance  Respiratory: Negative for shortness of breath  Cardiovascular: Negative for chest pain and palpitations  Gastrointestinal: Negative for abdominal pain, constipation and diarrhea  Endocrine: Negative for cold intolerance, heat intolerance, polydipsia, polyphagia and polyuria  Genitourinary: Negative for frequency  Musculoskeletal: Negative for arthralgias and myalgias  Skin: Negative for rash  Neurological: Negative for dizziness and tremors  Hematological: Negative for adenopathy  Psychiatric/Behavioral: Negative for sleep disturbance  All other systems reviewed and are negative  Physical Exam:  Body mass index is 42 73 kg/m²  /82   Pulse 72   Ht 5' 8" (1 727 m)   Wt 127 kg (281 lb)   BMI 42 73 kg/m²    Wt Readings from Last 3 Encounters:   10/10/18 127 kg (281 lb)   07/30/18 129 kg (284 lb 3 2 oz)   07/06/18 130 kg (286 lb 8 oz)       Physical Exam   Constitutional: She is oriented to person, place, and time  She appears well-developed and well-nourished  No distress     HENT:   Head: Normocephalic and atraumatic  Eyes: Pupils are equal, round, and reactive to light  Conjunctivae are normal    Neck: Normal range of motion  Neck supple  No thyromegaly present  Cardiovascular: Normal rate, regular rhythm and normal heart sounds  Pulses are no weak pulses  Pulses:       Dorsalis pedis pulses are 2+ on the right side, and 2+ on the left side  Pulmonary/Chest: Effort normal and breath sounds normal  No respiratory distress  She has no wheezes  She has no rales  Abdominal: Soft  Bowel sounds are normal  She exhibits no distension  There is no tenderness  Musculoskeletal: Normal range of motion  She exhibits no edema  Feet:   Right Foot:   Skin Integrity: Positive for dry skin  Negative for ulcer, skin breakdown, erythema, warmth or callus  Left Foot:   Skin Integrity: Positive for dry skin  Negative for ulcer, skin breakdown, erythema, warmth or callus  Neurological: She is alert and oriented to person, place, and time  Skin: Skin is warm and dry  Psychiatric: She has a normal mood and affect  Vitals reviewed  Patient's shoes and socks removed  Right Foot/Ankle   Right Foot Inspection  Skin Exam: skin normal and dry skin skin not intact, no warmth, no callus, no erythema, no maceration, no abnormal color, no pre-ulcer, no ulcer and no callus                            Sensory       Monofilament testing: intact  Vascular    The right DP pulse is 2+  Left Foot/Ankle  Left Foot Inspection  Skin Exam: skin normal and dry skinskin not intact, no warmth, no erythema, no maceration, normal color, no pre-ulcer, no ulcer and no callus                                         Sensory       Monofilament: intact  Vascular    The left DP pulse is 2+  Assign Risk Category:  No deformity present; No loss of protective sensation;  No weak pulses       Risk: 0      Labs:     Lab Results   Component Value Date    HGBA1C 6 8 08/14/2018     TSH 4 76, 1 07    Impression & Plan:    Problem List Items Addressed This Visit     Type 2 diabetes mellitus with hyperglycemia, without long-term current use of insulin ([address] ) - Primary     A1C close to goal, fasting morning BG slightly elevated  Continue current regimen for now and focus on dietary and lifestyle modifications  Relevant Orders    Hemoglobin A1C    Comprehensive metabolic panel    Hyperlipidemia     Triglycerides slightly elevated, not currently on statin, focus on dietary modifications          Relevant Orders    Lipid Panel with Direct LDL reflex    Elevated TSH     TSH slightly elevated with normal free T4, denies symptoms hypo or hyperthyroidism  She denies family history of thyroid disorders  Relevant Orders    TSH, 3rd generation    T4, free          Orders Placed This Encounter   Procedures    Hemoglobin A1C     Standing Status:   Future     Standing Expiration Date:   10/10/2019    Comprehensive metabolic panel     This is a patient instruction: Patient fasting for 8 hours or longer recommended  Standing Status:   Future     Standing Expiration Date:   10/10/2019    Lipid Panel with Direct LDL reflex     This is a patient instruction: This test requires patient fasting for 10-12 hours or longer  Drinking of black coffee or black tea is acceptable  Standing Status:   Future     Standing Expiration Date:   10/10/2019    TSH, 3rd generation     This is a patient instruction: This test is non-fasting  Please drink two glasses of water morning of bloodwork  Standing Status:   Future     Standing Expiration Date:   1/10/2019    T4, free     Standing Status:   Future     Standing Expiration Date:   1/10/2019         Discussed with the patient and all questioned fully answered  She will call me if any problems arise  Follow-up appointment in 3 months       Counseled patient on diagnostic results, prognosis, risk and benefit of treatment options, instruction for management, importance of treatment compliance, Risk factor reduction and impressions      [facility]

## 2018-10-29 ENCOUNTER — OFFICE VISIT (OUTPATIENT)
Dept: OBGYN CLINIC | Facility: CLINIC | Age: 43
End: 2018-10-29
Payer: COMMERCIAL

## 2018-10-29 VITALS
DIASTOLIC BLOOD PRESSURE: 84 MMHG | HEART RATE: 71 BPM | BODY MASS INDEX: 42.22 KG/M2 | WEIGHT: 278.6 LBS | SYSTOLIC BLOOD PRESSURE: 138 MMHG | HEIGHT: 68 IN

## 2018-10-29 DIAGNOSIS — M25.531 RIGHT WRIST PAIN: Primary | ICD-10-CM

## 2018-10-29 PROCEDURE — 99214 OFFICE O/P EST MOD 30 MIN: CPT | Performed by: ORTHOPAEDIC SURGERY

## 2018-10-29 NOTE — PROGRESS NOTES
ASSESSMENT/PLAN:    Assessment:   R wrist SL tear     Plan: We will order and MRI of the patients right wrist to look for possible ganglion vs partial SL ligament tear  She can continue wearing her wrist brace as needed  Follow Up: After Testing    To Do Next Visit:       General Discussions:       Operative Discussions:         _____________________________________________________  CHIEF COMPLAINT:  Chief Complaint   Patient presents with    Right Wrist - Follow-up         SUBJECTIVE:  Jackson Fry is a 37y o  year old female who presents for follow up regarding R wrist SL tear  Since last visit, Jackson Fry has tried steroid injection into right wrist SL complex without relief  Today there is Pain  Moderate  Constant  Aching to the right wrist   Patient states that she has difficulties opening items and driving  Pt denies numbness and tingling     Radiation: None  Associated symptoms: No Complaints    PAST MEDICAL HISTORY:  Past Medical History:   Diagnosis Date    Diabetes mellitus ([facility] [address] )        PAST SURGICAL HISTORY:  Past Surgical History:   Procedure Laterality Date    HAND SURGERY  09/2014    KNEE ARTHROPLASTY Right        FAMILY HISTORY:  Family History   Problem Relation Age of Onset    Diabetes unspecified Mother     Diabetes Mother     Diabetes unspecified Sister     Diabetes unspecified Paternal Grandfather     Diabetes Sister        SOCIAL HISTORY:  Social History   Substance Use Topics    Smoking status: Never Smoker    Smokeless tobacco: Never Used    Alcohol use Yes      Comment: Once in a blue moon       MEDICATIONS:    Current Outpatient Prescriptions:     Exenatide ER (BYDUREON BCI) 2 MG/0 85ML AUIJ, Inject 2 mg under the skin once a week, Disp: 4 pen, Rfl: 6    metFORMIN (GLUCOPHAGE) 1000 MG tablet, Take 1 tablet (1,000 mg total) by mouth 2 (two) times a day with meals, Disp: 60 tablet, Rfl: 6    ONETOUCH DELICA LANCETS FINE MISC, Check twice daily, Disp: 100 each, Rfl: 6    ONETOUCH VERIO test strip, 1 each by Other route 2 (two) times a day Use as instructed, Disp: 60 each, Rfl: 6    ALLERGIES:  Allergies   Allergen Reactions    Vicodin [Hydrocodone-Acetaminophen]        REVIEW OF SYSTEMS:  Pertinent items are noted in HPI  LABS:  HgA1c:   Lab Results   Component Value Date    HGBA1C 6 8 08/14/2018     BMP: No results found for: GLUCOSE, CALCIUM, NA, K, CO2, CL, BUN, CREATININE        _____________________________________________________  PHYSICAL EXAMINATION:  General: well developed and well nourished, alert, oriented times 3 and appears comfortable  Psychiatric: Normal  HEENT: Trachea Midline, No torticollis  Cardiovascular: No discernable arrhythmia  Pulmonary: No wheezing or stridor  Skin: No masses, erthema, lacerations, fluctation, ulcerations  Neurovascular: Sensation Intact to the Median, Ulnar, Radial Nerve, Motor Intact to the Median, Ulnar, Radial Nerve and Pulses Intact    MUSCULOSKELETAL EXAMINATION:  RIGHT SIDE:  Wrist:  tenderness to SL ligament complex, conrad manuever causes no instability, negative SL shuck, minimal tenderness no instability LT shuck, negative triquetrum ballottement, DRUJ is stable, minimal swelling dorsal aspect of wrist, full radial and ulnar deviation, full flexion and extension       _____________________________________________________  STUDIES REVIEWED:  No Studies to review      PROCEDURES PERFORMED:  Procedures  No Procedures performed today   Scribe Attestation    I,:   Roney Singleton am acting as a scribe while in the presence of the attending physician :        I,:   Janay Arnold MD personally performed the services described in this documentation    as scribed in my presence :

## 2018-11-05 ENCOUNTER — HOSPITAL ENCOUNTER (OUTPATIENT)
Dept: RADIOLOGY | Age: 43
Discharge: HOME | End: 2018-11-05
Payer: COMMERCIAL

## 2018-11-05 DIAGNOSIS — M25.531 RIGHT WRIST PAIN: ICD-10-CM

## 2018-11-05 PROCEDURE — 73221 MRI JOINT UPR EXTREM W/O DYE: CPT

## 2018-11-12 ENCOUNTER — OFFICE VISIT (OUTPATIENT)
Dept: OBGYN CLINIC | Facility: CLINIC | Age: 43
End: 2018-11-12
Payer: COMMERCIAL

## 2018-11-12 VITALS
HEIGHT: 68 IN | DIASTOLIC BLOOD PRESSURE: 91 MMHG | WEIGHT: 281.2 LBS | BODY MASS INDEX: 42.62 KG/M2 | SYSTOLIC BLOOD PRESSURE: 138 MMHG | HEART RATE: 84 BPM

## 2018-11-12 DIAGNOSIS — M77.8 RIGHT WRIST TENDONITIS: Primary | ICD-10-CM

## 2018-11-12 PROCEDURE — 99213 OFFICE O/P EST LOW 20 MIN: CPT | Performed by: ORTHOPAEDIC SURGERY

## 2018-11-12 NOTE — PROGRESS NOTES
ASSESSMENT/PLAN:    Assessment:   R wrist ECRL tendonitis    Plan:   Continue bracing prn  NSAIDs prn  HEP  Palm up lifting    Follow Up:  PRN    _____________________________________________________  CHIEF COMPLAINT:  Chief Complaint   Patient presents with    Right Wrist - Follow-up         SUBJECTIVE:  Lou Mcmillan is a 37y o  year old female who presents for follow up regarding right wrist pain  States it is intermittent in nature and today is a "good day "  States when it does bother her, it's moreso along the dorsal mid-wrist  Has tried previous injection without relief  PAST MEDICAL HISTORY:  Past Medical History:   Diagnosis Date    Diabetes mellitus ([address] )        PAST SURGICAL HISTORY:  Past Surgical History:   Procedure Laterality Date    HAND SURGERY  09/2014    KNEE ARTHROPLASTY Right        FAMILY HISTORY:  Family History   Problem Relation Age of Onset    Diabetes unspecified Mother     Diabetes Mother     Diabetes unspecified Sister     Diabetes unspecified Paternal Grandfather     Diabetes Sister        SOCIAL HISTORY:  Social History   Substance Use Topics    Smoking status: Never Smoker    Smokeless tobacco: Never Used    Alcohol use Yes      Comment: Once in a blue moon       MEDICATIONS:    Current Outpatient Prescriptions:     Exenatide ER (BYDUREON BCISE) 2 MG/0 85ML AUIJ, Inject 2 mg under the skin once a week, Disp: 4 pen, Rfl: 6    metFORMIN (GLUCOPHAGE) 1000 MG tablet, Take 1 tablet (1,000 mg total) by mouth 2 (two) times a day with meals, Disp: 60 tablet, Rfl: 6    ONETOUCH DELICA LANCETS FINE MISC, Check twice daily, Disp: 100 each, Rfl: 6    ONETOUCH VERIO test strip, 1 each by Other route 2 (two) times a day Use as instructed, Disp: 60 each, Rfl: 6    ALLERGIES:  Allergies   Allergen Reactions    Vicodin [Hydrocodone-Acetaminophen]        REVIEW OF SYSTEMS:  Pertinent items are noted in HPI  A comprehensive review of systems was negative      LABS:  HgA1c:   Lab Results   Component Value Date    HGBA1C 6 8 08/14/2018     BMP: No results found for: GLUCOSE, CALCIUM, NA, K, CO2, CL, BUN, CREATININE        _____________________________________________________  PHYSICAL EXAMINATION:  General: well developed and well nourished, alert, oriented times 3 and appears comfortable  Psychiatric: Normal  HEENT: Trachea Midline, No torticollis  Cardiovascular: No discernable arrhythmia  Pulmonary: No wheezing or stridor  Skin: No masses, erthema, lacerations, fluctation, ulcerations  Neurovascular: Sensation Intact to the Median, Ulnar, Radial Nerve, Motor Intact to the Median, Ulnar, Radial Nerve and Pulses Intact    MUSCULOSKELETAL EXAMINATION:  RIGHT SIDE:  Wrist:  Patient denies ttp throughout today  She has no instability with Lugo's maneuver  She has no pain with active resisted wrist extension but some minor dorsal discomfort with passive wrist flexion    _____________________________________________________  STUDIES REVIEWED:  Images were reviewd in PACS: MRI shows mild ECRL tenosynovitis without tear   No evidence of scapholunate ligament tear      PROCEDURES PERFORMED:  Procedures  No Procedures performed today   Scribe Attestation    I,:   Kena Ibarra PA-C am acting as a scribe while in the presence of the attending physician :        I,:   Santa Vaca MD personally performed the services described in this documentation    as scribed in my presence :

## 2019-01-09 DIAGNOSIS — E11.65 TYPE 2 DIABETES MELLITUS WITH HYPERGLYCEMIA, WITHOUT LONG-TERM CURRENT USE OF INSULIN (HCC): ICD-10-CM

## 2019-01-09 RX ORDER — BLOOD SUGAR DIAGNOSTIC
1 STRIP MISCELLANEOUS 2 TIMES DAILY
Qty: 60 EACH | Refills: 0 | Status: CANCELLED | OUTPATIENT
Start: 2019-01-09

## 2019-01-11 DIAGNOSIS — E11.65 TYPE 2 DIABETES MELLITUS WITH HYPERGLYCEMIA, WITHOUT LONG-TERM CURRENT USE OF INSULIN (HCC): ICD-10-CM

## 2019-01-11 RX ORDER — BLOOD SUGAR DIAGNOSTIC
1 STRIP MISCELLANEOUS 2 TIMES DAILY
Qty: 60 EACH | Refills: 0 | Status: CANCELLED | OUTPATIENT
Start: 2019-01-11

## 2019-01-14 DIAGNOSIS — E11.65 TYPE 2 DIABETES MELLITUS WITH HYPERGLYCEMIA, WITHOUT LONG-TERM CURRENT USE OF INSULIN (HCC): ICD-10-CM

## 2019-01-14 RX ORDER — BLOOD SUGAR DIAGNOSTIC
1 STRIP MISCELLANEOUS 2 TIMES DAILY
Qty: 180 EACH | Refills: 1 | Status: SHIPPED | OUTPATIENT
Start: 2019-01-14 | End: 2019-08-11 | Stop reason: SDUPTHER

## 2019-01-21 ENCOUNTER — TELEPHONE (OUTPATIENT)
Dept: ENDOCRINOLOGY | Facility: CLINIC | Age: 44
End: 2019-01-21

## 2019-01-21 NOTE — TELEPHONE ENCOUNTER
Patient called stating that [address] 2 MG/0 85ML until April or May and wanted to know what to do in the meantime

## 2019-01-22 ENCOUNTER — TELEPHONE (OUTPATIENT)
Dept: ENDOCRINOLOGY | Facility: CLINIC | Age: 44
End: 2019-01-22

## 2019-01-22 DIAGNOSIS — E11.65 TYPE 2 DIABETES MELLITUS WITH HYPERGLYCEMIA, WITHOUT LONG-TERM CURRENT USE OF INSULIN (HCC): Primary | ICD-10-CM

## 2019-01-22 NOTE — TELEPHONE ENCOUNTER
Will they cover Trulicity, Ozempic, or Victoza will Bydureon is on backorder?  If so can switch to alternative GLP

## 2019-01-26 LAB
LEFT EYE DIABETIC RETINOPATHY: NORMAL
RIGHT EYE DIABETIC RETINOPATHY: NORMAL

## 2019-01-30 ENCOUNTER — OFFICE VISIT (OUTPATIENT)
Dept: URGENT CARE | Age: 44
End: 2019-01-30
Payer: COMMERCIAL

## 2019-01-30 ENCOUNTER — APPOINTMENT (OUTPATIENT)
Dept: RADIOLOGY | Age: 44
End: 2019-01-30
Payer: COMMERCIAL

## 2019-01-30 VITALS
RESPIRATION RATE: 16 BRPM | DIASTOLIC BLOOD PRESSURE: 62 MMHG | OXYGEN SATURATION: 95 % | WEIGHT: 287 LBS | HEART RATE: 75 BPM | BODY MASS INDEX: 43.5 KG/M2 | HEIGHT: 68 IN | SYSTOLIC BLOOD PRESSURE: 119 MMHG

## 2019-01-30 DIAGNOSIS — T14.90XA INJURY: ICD-10-CM

## 2019-01-30 DIAGNOSIS — S63.501A SPRAIN OF RIGHT WRIST, INITIAL ENCOUNTER: Primary | ICD-10-CM

## 2019-01-30 PROCEDURE — G0382 LEV 3 HOSP TYPE B ED VISIT: HCPCS | Performed by: NURSE PRACTITIONER

## 2019-01-30 PROCEDURE — 73110 X-RAY EXAM OF WRIST: CPT

## 2019-01-30 RX ORDER — IBUPROFEN 200 MG
TABLET ORAL EVERY 6 HOURS PRN
COMMUNITY
End: 2019-05-31

## 2019-01-30 RX ORDER — NAPROXEN 500 MG/1
250 TABLET ORAL 2 TIMES DAILY WITH MEALS
COMMUNITY
End: 2019-05-14

## 2019-01-30 NOTE — PROGRESS NOTES
[facility]        NAME: Siddharth Velazquez is a 37 y o  female  : 1975    MRN: [id number]  DATE: 2019  TIME: 7:03 PM    Assessment and Plan   Sprain of right wrist, initial encounter [S63 501A]  1  Sprain of right wrist, initial encounter     2  Injury  XR wrist 3+ vw right         Patient Instructions     Patient Instructions   No acute fracture on x-ray  Rest   Naproxen as needed for pain  Ice every 3-4 hours  Continue to use wrist brace  Follow up with ortho if pain persist   Go to ER if pain persist        Chief Complaint     Chief Complaint   Patient presents with    Wrist Injury     initial injury Friday   R wrist bent back    reinjured with shoveling    has wrist wrap and iced  History of Present Illness   Siddharth Velazquez presents to the clinic c/o    This is a 15-year-old female here today with complaints of right wrist pain  She states about 5 days ago she was wrestling with her significant other when he fell back onto her hand, her hand was extended  She states she then had worsening pain when she shoveled snow yesterday  She has good ROM of the wrist   She has used Motrin, Tylenol arthritis and naproxen with no relief  She has used ice and wrist brace  Patient has had pain in that wrist in the past   She was seen by Ortho in November, she had x-rays and MRI done at that time  There is a diagnosis of tendinitis  Review of Systems   Review of Systems   Constitutional: Negative  HENT: Negative  Respiratory: Negative  Cardiovascular: Negative  Musculoskeletal: Positive for arthralgias  Skin: Negative  Neurological: Negative  Psychiatric/Behavioral: Negative            Current Medications     Long-Term Prescriptions   Medication Sig Dispense Refill    Dulaglutide (TRULICITY) 1 5 DE/3 3QD SOPN Inject 0 5 mL (1 5 mg total) under the skin once a week for 90 days 2 mL 2    ibuprofen (MOTRIN) 200 mg tablet Take by mouth every 6 (six) hours as needed for mild pain      metFORMIN (GLUCOPHAGE) 1000 MG tablet Take 1 tablet (1,000 mg total) by mouth 2 (two) times a day with meals 60 tablet 6    naproxen (NAPROSYN) 500 mg tablet Take 250 mg by mouth 2 (two) times a day with meals      ONETOUCH DELICA LANCETS FINE MISC Inject under the skin 2 (two) times a day for 180 days Check twice daily 180 each 1       Current Allergies     Allergies as of 01/30/2019 - Reviewed 01/30/2019   Allergen Reaction Noted    Vicodin [hydrocodone-acetaminophen]  04/27/2017            The following portions of the patient's history were reviewed and updated as appropriate: allergies, current medications, past family history, past medical history, past social history, past surgical history and problem list     Objective   /62   Pulse 75   Resp 16   Ht 5' 8" (1 727 m)   Wt 130 kg (287 lb)   LMP 01/11/2019 (Exact Date)   SpO2 95%   BMI 43 64 kg/m²        Physical Exam     Physical Exam   Constitutional: She is oriented to person, place, and time  She appears well-developed and well-nourished  Neck: Normal range of motion  Neck supple  Pulmonary/Chest: Effort normal    Musculoskeletal:   Right wrist tenderness to palpate over the radial aspect of the wrist   Able to flex and extend the wrist   Pain with ulnar deviation the wrist   No bruising or swelling noted  Neurological: She is alert and oriented to person, place, and time  Skin: Skin is warm and dry  Psychiatric: She has a normal mood and affect  Her behavior is normal    Nursing note and vitals reviewed  Right wrist x-ray:  No acute fracture

## 2019-01-30 NOTE — PATIENT INSTRUCTIONS
No acute fracture on x-ray  Rest   Naproxen as needed for pain  Ice every 3-4 hours  Continue to use wrist brace    Follow up with ortho if pain persist   Go to ER if pain persist

## 2019-02-06 LAB — HBA1C MFR BLD HPLC: 7.4 %

## 2019-02-14 ENCOUNTER — OFFICE VISIT (OUTPATIENT)
Dept: ENDOCRINOLOGY | Facility: CLINIC | Age: 44
End: 2019-02-14
Payer: COMMERCIAL

## 2019-02-14 VITALS
BODY MASS INDEX: 43.98 KG/M2 | HEART RATE: 67 BPM | DIASTOLIC BLOOD PRESSURE: 90 MMHG | SYSTOLIC BLOOD PRESSURE: 130 MMHG | WEIGHT: 290.2 LBS | HEIGHT: 68 IN

## 2019-02-14 DIAGNOSIS — E78.5 HYPERLIPIDEMIA, UNSPECIFIED HYPERLIPIDEMIA TYPE: ICD-10-CM

## 2019-02-14 DIAGNOSIS — R79.89 ELEVATED TSH: ICD-10-CM

## 2019-02-14 DIAGNOSIS — E11.65 TYPE 2 DIABETES MELLITUS WITH HYPERGLYCEMIA, WITHOUT LONG-TERM CURRENT USE OF INSULIN (HCC): Primary | ICD-10-CM

## 2019-02-14 DIAGNOSIS — R03.0 ELEVATED BP WITHOUT DIAGNOSIS OF HYPERTENSION: ICD-10-CM

## 2019-02-14 DIAGNOSIS — E66.01 CLASS 3 SEVERE OBESITY DUE TO EXCESS CALORIES WITH SERIOUS COMORBIDITY AND BODY MASS INDEX (BMI) OF 40.0 TO 44.9 IN ADULT (HCC): ICD-10-CM

## 2019-02-14 PROBLEM — E66.813 CLASS 3 SEVERE OBESITY DUE TO EXCESS CALORIES WITH SERIOUS COMORBIDITY AND BODY MASS INDEX (BMI) OF 40.0 TO 44.9 IN ADULT (HCC): Status: ACTIVE | Noted: 2019-02-14

## 2019-02-14 PROCEDURE — 99214 OFFICE O/P EST MOD 30 MIN: CPT | Performed by: INTERNAL MEDICINE

## 2019-02-14 NOTE — PROGRESS NOTES
Praveen Irizarry 37 y o  female MRN: [id number]    Encounter: [id number]      Assessment/Plan     Problem List Items Addressed This Visit        Endocrine    Type 2 diabetes mellitus with hyperglycemia, without long-term current use of insulin ([facility] [address] ) - Primary     Lab Results   Component Value Date    HGBA1C 7 4 02/06/2019       A1c above goal-discussed dietary modifications-will refer for medical nutrition therapy    Will continue current regimen for now and reassess next visit         Relevant Medications    metFORMIN (GLUCOPHAGE) 1000 MG tablet    Other Relevant Orders    Ambulatory referral to medical nutrition therapy for diabetes    Comprehensive metabolic panel Lab Collect    HEMOGLOBIN A1C W/ EAG ESTIMATION Lab Collect    Microalbumin / creatinine urine ratio Lab Collect       Other    Hyperlipidemia     Improved-encouraged dietary modifications         Relevant Orders    Ambulatory referral to medical nutrition therapy for diabetes    Comprehensive metabolic panel Lab Collect    Elevated BP without diagnosis of hypertension    Relevant Orders    Comprehensive metabolic panel Lab Collect    Elevated TSH     Will repeat thyroid function tests         Relevant Orders    TSH, 3rd generation Lab Collect    T4, free Lab Collect    Class 3 severe obesity due to excess calories with serious comorbidity and body mass index (BMI) of 40 0 to 44 9 in adult (HCC)     Counseled on metabolic complications of obesity-referred for medical nutrition therapy         Relevant Orders    Ambulatory referral to medical nutrition therapy for diabetes        CC: Diabetes    History of Present Illness     HPI:  77-year-old female with history of type 2 diabetes, hyperlipidemia, severe obesity here for follow-up  Current regimen- metformin 1000 mg twice a day and trulicity -  Checks f s twice daily - fasting 150-190s  predinner 120-150s  tolerating meds without GI SE,  Denies any polyuria , polydipsia , no blurry vision , no numbness and tingling in feet    Review of Systems   Constitutional: Positive for fatigue  Negative for unexpected weight change  Eyes: Negative for visual disturbance  Respiratory: Negative for cough and shortness of breath  Cardiovascular: Negative for palpitations and leg swelling  Gastrointestinal: Negative for constipation, nausea and vomiting  Endocrine: Negative for polydipsia and polyuria  Musculoskeletal: Positive for arthralgias  Negative for gait problem and myalgias  Skin: Negative for color change, pallor, rash and wound  Psychiatric/Behavioral: Negative for sleep disturbance  All other systems reviewed and are negative        Historical Information   Past Medical History:   Diagnosis Date    Diabetes mellitus ([facility] [address] )      Past Surgical History:   Procedure Laterality Date    HAND SURGERY  09/2014    KNEE ARTHROPLASTY Right     WISDOM TOOTH EXTRACTION       Social History   Social History     Substance and Sexual Activity   Alcohol Use Yes    Comment: Once in a blue moon     Social History     Substance and Sexual Activity   Drug Use No     Social History     Tobacco Use   Smoking Status Never Smoker   Smokeless Tobacco Never Used     Family History:   Family History   Problem Relation Age of Onset    Diabetes unspecified Mother     Diabetes Mother     Diabetes unspecified Sister     Diabetes unspecified Paternal Grandfather     Diabetes Sister        Meds/Allergies   Current Outpatient Medications   Medication Sig Dispense Refill    Acetaminophen (TYLENOL ARTHRITIS EXT RELIEF PO) Take by mouth      Dulaglutide (TRULICITY) 1 5 NK/1 7HB SOPN Inject 0 5 mL (1 5 mg total) under the skin once a week for 90 days 2 mL 2    ibuprofen (MOTRIN) 200 mg tablet Take by mouth every 6 (six) hours as needed for mild pain      metFORMIN (GLUCOPHAGE) 1000 MG tablet Take 1 tablet (1,000 mg total) by mouth 2 (two) times a day with meals 180 tablet 3    naproxen (NAPROSYN) 500 mg tablet Take 250 mg by mouth 2 (two) times a day with meals      ONETOUCH DELICA LANCETS FINE MISC Inject under the skin 2 (two) times a day for 180 days Check twice daily 180 each 1    ONETOUCH VERIO test strip 1 each by Other route 2 (two) times a day for 180 days Use as instructed 180 each 1     No current facility-administered medications for this visit  Allergies   Allergen Reactions    Vicodin [Hydrocodone-Acetaminophen]        Objective   Vitals: Blood pressure 130/90, pulse 67, height 5' 8" (1 727 m), weight 132 kg (290 lb 3 2 oz)  Physical Exam   Constitutional: She is oriented to person, place, and time  She appears well-developed and well-nourished  No distress  HENT:   Head: Normocephalic and atraumatic  Eyes: EOM are normal  No scleral icterus  Neck: Normal range of motion  Neck supple  No thyromegaly present  Cardiovascular: Normal rate, regular rhythm and normal heart sounds  No murmur heard  Pulses:       Dorsalis pedis pulses are 1+ on the right side, and 1+ on the left side  Pulmonary/Chest: Effort normal and breath sounds normal  No respiratory distress  She has no wheezes  She has no rales  Abdominal: Soft  Bowel sounds are normal  She exhibits no distension  There is no tenderness  There is no guarding  Musculoskeletal: Normal range of motion  She exhibits no edema or deformity  Feet:   Right Foot:   Skin Integrity: Positive for callus and dry skin  Negative for ulcer, skin breakdown, erythema or warmth  Left Foot:   Skin Integrity: Positive for callus and dry skin  Negative for ulcer, skin breakdown, erythema or warmth  Lymphadenopathy:     She has no cervical adenopathy  Neurological: She is alert and oriented to person, place, and time  Skin: Skin is warm and dry  Psychiatric: She has a normal mood and affect  Her behavior is normal  Judgment and thought content normal      Patient's shoes and socks removed  Right Foot/Ankle   Right Foot Inspection  Skin Exam: skin normal, skin intact, dry skin, callus and callus no warmth, no erythema, no maceration, no abnormal color, no pre-ulcer and no ulcer                          Toe Exam: ROM and strength within normal limits  Sensory       Monofilament testing: intact  Vascular    The right DP pulse is 1+  Left Foot/Ankle  Left Foot Inspection  Skin Exam: skin normal, skin intact, dry skin and callusno warmth, no erythema, no maceration, normal color, no pre-ulcer and no ulcer                         Toe Exam: ROM and strength within normal limits                   Sensory       Monofilament: intact  Vascular    The left DP pulse is 1+  Assign Risk Category:  No deformity present; No loss of protective sensation;        Risk: 0    The history was obtained from the review of the chart, patient  Lab Results:   Lab Results   Component Value Date/Time    Hemoglobin A1C 7 4 02/06/2019    Hemoglobin A1C 6 8 08/14/2018    Hemoglobin A1C 8 3 04/05/2018               Portions of the record may have been created with voice recognition software  Occasional wrong word or "sound a like" substitutions may have occurred due to the inherent limitations of voice recognition software  Read the chart carefully and recognize, using context, where substitutions have occurred

## 2019-02-15 ENCOUNTER — TELEPHONE (OUTPATIENT)
Dept: ENDOCRINOLOGY | Facility: CLINIC | Age: 44
End: 2019-02-15

## 2019-02-15 NOTE — TELEPHONE ENCOUNTER
----- Message from [place] sent at 2/8/2019 11:58 AM EST -----  Please call the patient regarding her abnormal result  Will discuss at upcoming visit   Please bring BG log/meter

## 2019-03-22 DIAGNOSIS — E11.65 TYPE 2 DIABETES MELLITUS WITH HYPERGLYCEMIA, WITHOUT LONG-TERM CURRENT USE OF INSULIN (HCC): ICD-10-CM

## 2019-03-28 DIAGNOSIS — E11.65 TYPE 2 DIABETES MELLITUS WITH HYPERGLYCEMIA, WITHOUT LONG-TERM CURRENT USE OF INSULIN (HCC): ICD-10-CM

## 2019-04-11 DIAGNOSIS — E11.65 TYPE 2 DIABETES MELLITUS WITH HYPERGLYCEMIA, WITHOUT LONG-TERM CURRENT USE OF INSULIN (HCC): ICD-10-CM

## 2019-04-11 RX ORDER — DULAGLUTIDE 1.5 MG/.5ML
INJECTION, SOLUTION SUBCUTANEOUS
Qty: 2 ML | Refills: 2 | Status: SHIPPED | OUTPATIENT
Start: 2019-04-11 | End: 2019-08-26

## 2019-05-09 ENCOUNTER — APPOINTMENT (OUTPATIENT)
Dept: RADIOLOGY | Age: 44
End: 2019-05-09
Payer: COMMERCIAL

## 2019-05-09 ENCOUNTER — OFFICE VISIT (OUTPATIENT)
Dept: URGENT CARE | Age: 44
End: 2019-05-09
Payer: COMMERCIAL

## 2019-05-09 VITALS
DIASTOLIC BLOOD PRESSURE: 78 MMHG | TEMPERATURE: 98.6 F | HEART RATE: 81 BPM | RESPIRATION RATE: 20 BRPM | SYSTOLIC BLOOD PRESSURE: 151 MMHG

## 2019-05-09 DIAGNOSIS — M25.562 ACUTE PAIN OF LEFT KNEE: Primary | ICD-10-CM

## 2019-05-09 DIAGNOSIS — M25.562 ACUTE PAIN OF LEFT KNEE: ICD-10-CM

## 2019-05-09 PROBLEM — M17.12 DEGENERATIVE JOINT DISEASE OF LEFT KNEE: Status: ACTIVE | Noted: 2019-05-09

## 2019-05-09 PROBLEM — M25.762 OSTEOPHYTE OF LEFT KNEE: Status: ACTIVE | Noted: 2019-05-09

## 2019-05-09 PROCEDURE — 73564 X-RAY EXAM KNEE 4 OR MORE: CPT

## 2019-05-09 PROCEDURE — G0382 LEV 3 HOSP TYPE B ED VISIT: HCPCS | Performed by: FAMILY MEDICINE

## 2019-05-09 RX ORDER — IBUPROFEN 800 MG/1
800 TABLET ORAL EVERY 6 HOURS PRN
Qty: 30 TABLET | Refills: 0 | Status: SHIPPED | OUTPATIENT
Start: 2019-05-09 | End: 2019-08-26

## 2019-05-13 RX ORDER — LANCETS 33 GAUGE
EACH MISCELLANEOUS
COMMUNITY
Start: 2018-07-06 | End: 2020-08-20 | Stop reason: CLARIF

## 2019-05-14 ENCOUNTER — OFFICE VISIT (OUTPATIENT)
Dept: FAMILY MEDICINE CLINIC | Facility: CLINIC | Age: 44
End: 2019-05-14
Payer: COMMERCIAL

## 2019-05-14 VITALS
HEIGHT: 69 IN | OXYGEN SATURATION: 99 % | BODY MASS INDEX: 42.45 KG/M2 | HEART RATE: 77 BPM | RESPIRATION RATE: 16 BRPM | TEMPERATURE: 97.3 F | DIASTOLIC BLOOD PRESSURE: 84 MMHG | SYSTOLIC BLOOD PRESSURE: 134 MMHG | WEIGHT: 286.6 LBS

## 2019-05-14 DIAGNOSIS — E78.5 HYPERLIPIDEMIA, UNSPECIFIED HYPERLIPIDEMIA TYPE: ICD-10-CM

## 2019-05-14 DIAGNOSIS — M25.562 ACUTE PAIN OF LEFT KNEE: ICD-10-CM

## 2019-05-14 DIAGNOSIS — E66.01 CLASS 3 SEVERE OBESITY DUE TO EXCESS CALORIES WITH SERIOUS COMORBIDITY AND BODY MASS INDEX (BMI) OF 40.0 TO 44.9 IN ADULT (HCC): ICD-10-CM

## 2019-05-14 DIAGNOSIS — Z00.00 ANNUAL PHYSICAL EXAM: Primary | ICD-10-CM

## 2019-05-14 DIAGNOSIS — I10 MILD ESSENTIAL HYPERTENSION: ICD-10-CM

## 2019-05-14 DIAGNOSIS — E11.65 TYPE 2 DIABETES MELLITUS WITH HYPERGLYCEMIA, WITHOUT LONG-TERM CURRENT USE OF INSULIN (HCC): ICD-10-CM

## 2019-05-14 PROCEDURE — 99386 PREV VISIT NEW AGE 40-64: CPT | Performed by: FAMILY MEDICINE

## 2019-05-15 PROBLEM — R03.0 ELEVATED BLOOD PRESSURE READING: Status: RESOLVED | Noted: 2018-02-22 | Resolved: 2019-05-15

## 2019-05-15 PROBLEM — I10 MILD ESSENTIAL HYPERTENSION: Status: ACTIVE | Noted: 2018-04-05

## 2019-05-15 RX ORDER — LISINOPRIL 10 MG/1
10 TABLET ORAL DAILY
Qty: 90 TABLET | Refills: 1 | Status: SHIPPED | OUTPATIENT
Start: 2019-05-15 | End: 2019-11-12 | Stop reason: SDUPTHER

## 2019-05-16 ENCOUNTER — OFFICE VISIT (OUTPATIENT)
Dept: OBGYN CLINIC | Facility: HOSPITAL | Age: 44
End: 2019-05-16
Payer: COMMERCIAL

## 2019-05-16 VITALS
BODY MASS INDEX: 42.36 KG/M2 | SYSTOLIC BLOOD PRESSURE: 146 MMHG | DIASTOLIC BLOOD PRESSURE: 92 MMHG | WEIGHT: 286 LBS | HEART RATE: 92 BPM | HEIGHT: 69 IN

## 2019-05-16 DIAGNOSIS — S83.242A OTHER TEAR OF MEDIAL MENISCUS, CURRENT INJURY, LEFT KNEE, INITIAL ENCOUNTER: Primary | ICD-10-CM

## 2019-05-16 DIAGNOSIS — M25.562 ACUTE PAIN OF LEFT KNEE: ICD-10-CM

## 2019-05-16 PROCEDURE — 20610 DRAIN/INJ JOINT/BURSA W/O US: CPT | Performed by: PHYSICIAN ASSISTANT

## 2019-05-16 PROCEDURE — 99213 OFFICE O/P EST LOW 20 MIN: CPT | Performed by: PHYSICIAN ASSISTANT

## 2019-05-16 RX ADMIN — BUPIVACAINE HYDROCHLORIDE 2 ML: 2.5 INJECTION, SOLUTION INFILTRATION; PERINEURAL at 19:17

## 2019-05-16 RX ADMIN — LIDOCAINE HYDROCHLORIDE 1 ML: 20 INJECTION, SOLUTION INFILTRATION; PERINEURAL at 19:17

## 2019-05-22 ENCOUNTER — EVALUATION (OUTPATIENT)
Dept: PHYSICAL THERAPY | Facility: CLINIC | Age: 44
End: 2019-05-22
Payer: COMMERCIAL

## 2019-05-22 DIAGNOSIS — M25.562 ACUTE PAIN OF LEFT KNEE: ICD-10-CM

## 2019-05-22 DIAGNOSIS — E11.65 TYPE 2 DIABETES MELLITUS WITH HYPERGLYCEMIA, WITHOUT LONG-TERM CURRENT USE OF INSULIN (HCC): ICD-10-CM

## 2019-05-22 DIAGNOSIS — S83.242A OTHER TEAR OF MEDIAL MENISCUS, CURRENT INJURY, LEFT KNEE, INITIAL ENCOUNTER: Primary | ICD-10-CM

## 2019-05-22 PROCEDURE — 97161 PT EVAL LOW COMPLEX 20 MIN: CPT | Performed by: PHYSICAL THERAPIST

## 2019-05-22 PROCEDURE — 97110 THERAPEUTIC EXERCISES: CPT | Performed by: PHYSICAL THERAPIST

## 2019-05-23 ENCOUNTER — APPOINTMENT (OUTPATIENT)
Dept: PHYSICAL THERAPY | Facility: CLINIC | Age: 44
End: 2019-05-23
Payer: COMMERCIAL

## 2019-05-23 LAB
CREAT ?TM UR-SCNC: 139 UMOL/L
EXT MICROALBUMIN URINE RANDOM: 1
HBA1C MFR BLD HPLC: 7.2 %
MICROALBUMIN/CREAT UR: 7.2 MG/G{CREAT}

## 2019-05-25 ENCOUNTER — PATIENT MESSAGE (OUTPATIENT)
Dept: FAMILY MEDICINE CLINIC | Facility: CLINIC | Age: 44
End: 2019-05-25

## 2019-05-27 RX ORDER — BUPIVACAINE HYDROCHLORIDE 2.5 MG/ML
2 INJECTION, SOLUTION INFILTRATION; PERINEURAL
Status: COMPLETED | OUTPATIENT
Start: 2019-05-16 | End: 2019-05-16

## 2019-05-27 RX ORDER — LIDOCAINE HYDROCHLORIDE 20 MG/ML
1 INJECTION, SOLUTION INFILTRATION; PERINEURAL
Status: COMPLETED | OUTPATIENT
Start: 2019-05-16 | End: 2019-05-16

## 2019-05-28 ENCOUNTER — TELEPHONE (OUTPATIENT)
Dept: ENDOCRINOLOGY | Facility: CLINIC | Age: 44
End: 2019-05-28

## 2019-05-29 ENCOUNTER — OFFICE VISIT (OUTPATIENT)
Dept: PHYSICAL THERAPY | Facility: CLINIC | Age: 44
End: 2019-05-29
Payer: COMMERCIAL

## 2019-05-29 DIAGNOSIS — S83.242A OTHER TEAR OF MEDIAL MENISCUS, CURRENT INJURY, LEFT KNEE, INITIAL ENCOUNTER: Primary | ICD-10-CM

## 2019-05-29 DIAGNOSIS — M25.562 ACUTE PAIN OF LEFT KNEE: ICD-10-CM

## 2019-05-29 PROCEDURE — 97140 MANUAL THERAPY 1/> REGIONS: CPT | Performed by: PHYSICAL THERAPIST

## 2019-05-29 PROCEDURE — 97110 THERAPEUTIC EXERCISES: CPT | Performed by: PHYSICAL THERAPIST

## 2019-05-30 ENCOUNTER — TELEPHONE (OUTPATIENT)
Dept: FAMILY MEDICINE CLINIC | Facility: CLINIC | Age: 44
End: 2019-05-30

## 2019-05-30 DIAGNOSIS — E78.5 HYPERLIPIDEMIA, UNSPECIFIED HYPERLIPIDEMIA TYPE: Primary | ICD-10-CM

## 2019-05-30 RX ORDER — ATORVASTATIN CALCIUM 10 MG/1
10 TABLET, FILM COATED ORAL DAILY
Qty: 90 TABLET | Refills: 1 | Status: SHIPPED | OUTPATIENT
Start: 2019-05-30 | End: 2019-11-12 | Stop reason: SDUPTHER

## 2019-05-31 ENCOUNTER — TELEPHONE (OUTPATIENT)
Dept: FAMILY MEDICINE CLINIC | Facility: CLINIC | Age: 44
End: 2019-05-31

## 2019-06-03 ENCOUNTER — OFFICE VISIT (OUTPATIENT)
Dept: PHYSICAL THERAPY | Facility: CLINIC | Age: 44
End: 2019-06-03
Payer: COMMERCIAL

## 2019-06-03 DIAGNOSIS — S83.242A OTHER TEAR OF MEDIAL MENISCUS, CURRENT INJURY, LEFT KNEE, INITIAL ENCOUNTER: Primary | ICD-10-CM

## 2019-06-03 DIAGNOSIS — M25.562 ACUTE PAIN OF LEFT KNEE: ICD-10-CM

## 2019-06-03 PROCEDURE — 97140 MANUAL THERAPY 1/> REGIONS: CPT | Performed by: PHYSICAL THERAPIST

## 2019-06-03 PROCEDURE — 97110 THERAPEUTIC EXERCISES: CPT | Performed by: PHYSICAL THERAPIST

## 2019-06-05 ENCOUNTER — OFFICE VISIT (OUTPATIENT)
Dept: PHYSICAL THERAPY | Facility: CLINIC | Age: 44
End: 2019-06-05
Payer: COMMERCIAL

## 2019-06-05 DIAGNOSIS — S83.242A OTHER TEAR OF MEDIAL MENISCUS, CURRENT INJURY, LEFT KNEE, INITIAL ENCOUNTER: Primary | ICD-10-CM

## 2019-06-05 DIAGNOSIS — M25.562 ACUTE PAIN OF LEFT KNEE: ICD-10-CM

## 2019-06-05 PROCEDURE — 97140 MANUAL THERAPY 1/> REGIONS: CPT

## 2019-06-05 PROCEDURE — 97110 THERAPEUTIC EXERCISES: CPT

## 2019-06-07 ENCOUNTER — OFFICE VISIT (OUTPATIENT)
Dept: OBGYN CLINIC | Facility: CLINIC | Age: 44
End: 2019-06-07
Payer: COMMERCIAL

## 2019-06-07 VITALS
HEIGHT: 69 IN | BODY MASS INDEX: 41.92 KG/M2 | HEART RATE: 88 BPM | SYSTOLIC BLOOD PRESSURE: 128 MMHG | DIASTOLIC BLOOD PRESSURE: 90 MMHG | WEIGHT: 283 LBS

## 2019-06-07 DIAGNOSIS — M17.12 PATELLOFEMORAL ARTHRITIS OF LEFT KNEE: Primary | ICD-10-CM

## 2019-06-07 DIAGNOSIS — S83.242A OTHER TEAR OF MEDIAL MENISCUS, CURRENT INJURY, LEFT KNEE, INITIAL ENCOUNTER: ICD-10-CM

## 2019-06-07 PROCEDURE — 99213 OFFICE O/P EST LOW 20 MIN: CPT | Performed by: ORTHOPAEDIC SURGERY

## 2019-06-10 ENCOUNTER — APPOINTMENT (OUTPATIENT)
Dept: PHYSICAL THERAPY | Facility: CLINIC | Age: 44
End: 2019-06-10
Payer: COMMERCIAL

## 2019-06-12 ENCOUNTER — APPOINTMENT (OUTPATIENT)
Dept: PHYSICAL THERAPY | Facility: CLINIC | Age: 44
End: 2019-06-12
Payer: COMMERCIAL

## 2019-06-12 ENCOUNTER — HOSPITAL ENCOUNTER (OUTPATIENT)
Dept: RADIOLOGY | Age: 44
Discharge: HOME | End: 2019-06-12
Payer: COMMERCIAL

## 2019-06-12 DIAGNOSIS — M17.12 PATELLOFEMORAL ARTHRITIS OF LEFT KNEE: ICD-10-CM

## 2019-06-12 DIAGNOSIS — S83.242A OTHER TEAR OF MEDIAL MENISCUS, CURRENT INJURY, LEFT KNEE, INITIAL ENCOUNTER: ICD-10-CM

## 2019-06-12 PROCEDURE — 73721 MRI JNT OF LWR EXTRE W/O DYE: CPT

## 2019-06-14 ENCOUNTER — OFFICE VISIT (OUTPATIENT)
Dept: OBGYN CLINIC | Facility: CLINIC | Age: 44
End: 2019-06-14
Payer: COMMERCIAL

## 2019-06-14 VITALS — WEIGHT: 283 LBS | BODY MASS INDEX: 41.92 KG/M2 | HEIGHT: 69 IN

## 2019-06-14 DIAGNOSIS — M17.12 PATELLOFEMORAL ARTHRITIS OF LEFT KNEE: Primary | ICD-10-CM

## 2019-06-14 PROCEDURE — 99213 OFFICE O/P EST LOW 20 MIN: CPT | Performed by: ORTHOPAEDIC SURGERY

## 2019-06-17 ENCOUNTER — APPOINTMENT (OUTPATIENT)
Dept: PHYSICAL THERAPY | Facility: CLINIC | Age: 44
End: 2019-06-17
Payer: COMMERCIAL

## 2019-06-19 ENCOUNTER — APPOINTMENT (OUTPATIENT)
Dept: PHYSICAL THERAPY | Facility: CLINIC | Age: 44
End: 2019-06-19
Payer: COMMERCIAL

## 2019-06-19 ENCOUNTER — OFFICE VISIT (OUTPATIENT)
Dept: ENDOCRINOLOGY | Facility: CLINIC | Age: 44
End: 2019-06-19
Payer: COMMERCIAL

## 2019-06-19 VITALS
HEART RATE: 87 BPM | DIASTOLIC BLOOD PRESSURE: 90 MMHG | HEIGHT: 69 IN | WEIGHT: 291.6 LBS | BODY MASS INDEX: 43.19 KG/M2 | SYSTOLIC BLOOD PRESSURE: 142 MMHG

## 2019-06-19 DIAGNOSIS — E78.5 HYPERLIPIDEMIA, UNSPECIFIED HYPERLIPIDEMIA TYPE: ICD-10-CM

## 2019-06-19 DIAGNOSIS — R79.89 ELEVATED TSH: ICD-10-CM

## 2019-06-19 DIAGNOSIS — E11.65 TYPE 2 DIABETES MELLITUS WITH HYPERGLYCEMIA, WITHOUT LONG-TERM CURRENT USE OF INSULIN (HCC): Primary | ICD-10-CM

## 2019-06-19 DIAGNOSIS — I10 MILD ESSENTIAL HYPERTENSION: ICD-10-CM

## 2019-06-19 PROCEDURE — 99214 OFFICE O/P EST MOD 30 MIN: CPT | Performed by: NURSE PRACTITIONER

## 2019-06-24 ENCOUNTER — APPOINTMENT (OUTPATIENT)
Dept: PHYSICAL THERAPY | Facility: CLINIC | Age: 44
End: 2019-06-24
Payer: COMMERCIAL

## 2019-06-26 ENCOUNTER — APPOINTMENT (OUTPATIENT)
Dept: PHYSICAL THERAPY | Facility: CLINIC | Age: 44
End: 2019-06-26
Payer: COMMERCIAL

## 2019-07-03 DIAGNOSIS — E11.65 TYPE 2 DIABETES MELLITUS WITH HYPERGLYCEMIA, WITHOUT LONG-TERM CURRENT USE OF INSULIN (HCC): ICD-10-CM

## 2019-07-03 RX ORDER — DULAGLUTIDE 1.5 MG/.5ML
INJECTION, SOLUTION SUBCUTANEOUS
Qty: 2 ML | Refills: 2 | Status: SHIPPED | OUTPATIENT
Start: 2019-07-03 | End: 2019-08-26

## 2019-07-04 DIAGNOSIS — E11.65 TYPE 2 DIABETES MELLITUS WITH HYPERGLYCEMIA, WITHOUT LONG-TERM CURRENT USE OF INSULIN (HCC): ICD-10-CM

## 2019-07-06 RX ORDER — DULAGLUTIDE 1.5 MG/.5ML
INJECTION, SOLUTION SUBCUTANEOUS
Qty: 2 ML | Refills: 2 | Status: SHIPPED | OUTPATIENT
Start: 2019-07-06 | End: 2019-09-25 | Stop reason: SDUPTHER

## 2019-07-12 ENCOUNTER — TELEPHONE (OUTPATIENT)
Dept: OBGYN CLINIC | Facility: HOSPITAL | Age: 44
End: 2019-07-12

## 2019-07-12 NOTE — TELEPHONE ENCOUNTER
TO BE COMPLETED BY CENTRAL AUTH TEAM:     Physician: DR Kaitlynn Montesinos    Medication: SYNVISC ONE    Number of Injections in Series (Appointments scheduled 1 week apart from one another): 1    Schedule after this date: 7/18/19    Billing Info: Buy and Bill/Specialty Pharmacy-Patient Supply>   [facility]SPE[facility]    Appointment Message Line:  (please copy and paste appointment message line when scheduling appointment)   LEFT KNEE SYNVISC ONE/[facility]SPECIALTY PHARMAC[facility]    Additional Comments: INJECTION SENT TO [address] [place] [address]

## 2019-08-11 DIAGNOSIS — E11.65 TYPE 2 DIABETES MELLITUS WITH HYPERGLYCEMIA, WITHOUT LONG-TERM CURRENT USE OF INSULIN (HCC): ICD-10-CM

## 2019-08-11 RX ORDER — BLOOD SUGAR DIAGNOSTIC
STRIP MISCELLANEOUS
Qty: 150 EACH | Refills: 1 | Status: SHIPPED | OUTPATIENT
Start: 2019-08-11 | End: 2019-09-25 | Stop reason: SDUPTHER

## 2019-08-26 ENCOUNTER — TELEPHONE (OUTPATIENT)
Dept: FAMILY MEDICINE CLINIC | Facility: CLINIC | Age: 44
End: 2019-08-26

## 2019-08-26 ENCOUNTER — OFFICE VISIT (OUTPATIENT)
Dept: FAMILY MEDICINE CLINIC | Facility: CLINIC | Age: 44
End: 2019-08-26
Payer: COMMERCIAL

## 2019-08-26 VITALS
OXYGEN SATURATION: 100 % | BODY MASS INDEX: 42 KG/M2 | SYSTOLIC BLOOD PRESSURE: 128 MMHG | RESPIRATION RATE: 16 BRPM | TEMPERATURE: 98.4 F | HEART RATE: 99 BPM | HEIGHT: 69 IN | WEIGHT: 283.6 LBS | DIASTOLIC BLOOD PRESSURE: 82 MMHG

## 2019-08-26 DIAGNOSIS — H81.13 BENIGN PAROXYSMAL POSITIONAL VERTIGO DUE TO BILATERAL VESTIBULAR DISORDER: Primary | ICD-10-CM

## 2019-08-26 DIAGNOSIS — I10 MILD ESSENTIAL HYPERTENSION: ICD-10-CM

## 2019-08-26 DIAGNOSIS — E66.01 CLASS 3 SEVERE OBESITY DUE TO EXCESS CALORIES WITH SERIOUS COMORBIDITY AND BODY MASS INDEX (BMI) OF 40.0 TO 44.9 IN ADULT (HCC): ICD-10-CM

## 2019-08-26 DIAGNOSIS — E11.65 TYPE 2 DIABETES MELLITUS WITH HYPERGLYCEMIA, WITHOUT LONG-TERM CURRENT USE OF INSULIN (HCC): ICD-10-CM

## 2019-08-26 DIAGNOSIS — G43.909 MIGRAINE WITHOUT STATUS MIGRAINOSUS, NOT INTRACTABLE, UNSPECIFIED MIGRAINE TYPE: ICD-10-CM

## 2019-08-26 PROCEDURE — 3074F SYST BP LT 130 MM HG: CPT | Performed by: FAMILY MEDICINE

## 2019-08-26 PROCEDURE — 99214 OFFICE O/P EST MOD 30 MIN: CPT | Performed by: FAMILY MEDICINE

## 2019-08-26 PROCEDURE — 3008F BODY MASS INDEX DOCD: CPT | Performed by: FAMILY MEDICINE

## 2019-08-26 RX ORDER — MECLIZINE HCL 12.5 MG/1
12.5 TABLET ORAL 3 TIMES DAILY PRN
COMMUNITY
Start: 2019-08-25 | End: 2020-05-12 | Stop reason: SDUPTHER

## 2019-08-26 RX ORDER — HYLAN G-F 20 16MG/2ML
SYRINGE (ML) INTRAARTICULAR
Refills: 0 | COMMUNITY
Start: 2019-07-11 | End: 2019-11-12

## 2019-08-26 NOTE — TELEPHONE ENCOUNTER
Please see if she can come in for OV to assess the dizziness to see if she has vertigo or dizziness for another reason?

## 2019-08-26 NOTE — PATIENT INSTRUCTIONS
Benign Paroxysmal Positional Vertigo, Ambulatory Care   GENERAL INFORMATION:   Benign paroxysmal positional vertigo (BPPV)  is an inner ear condition  With BPPV you have paroxysmal (sudden) attacks of vertigo when you change your head position  Vertigo is the sudden feeling that you or the room is moving or spinning  With each attack of vertigo, you may have nystagmus  Nystagmus is a quick, shaky eye movement that you cannot control  The attacks of vertigo and nystagmus last from a few seconds up to 1 minute  Common symptoms include the following:  Head movements, such as looking up or down and bending over, may lead to an attack of vertigo  Vertigo may also occur when you first lie down or roll over in bed  The nystagmus will decrease with vertigo attacks that occur close together  When you have an attack of BPPV, you may also have the following symptoms:  · Changes in your vision    · Nausea or vomiting    · Poor balance or feeling unsteady when you walk  Seek immediate care for the following symptoms:   · A severe headache that does not go away    · New changes in your vision or feeling weak or confused    · Trouble hearing, or ringing or buzzing in your ears    · Symptoms that last longer than 1 minute  Treatment for BPPV  may go away on it's own  Treatments may include head movements or balance therapy  You may need surgery if other treatments have failed  Manage your symptoms:   · Avoid sudden head movements  · Do not bend over at the waist       · Keep your head raised when you lie down  Place pillows under your upper back and head or rest in a recliner  · Try not to stay in bed for long periods of time  Change your position often when you are in bed  Try not to lie with your head on the same side for long periods of time  · Go to vestibular and balance rehabilitation therapy (VBRT)  During VBRT, you learn exercises to improve your balance and strength   VBRT may help decrease your dizziness and prevent injuries if you are at risk for falls  Ask for more information about VBRT and exercises to decrease your symptoms  Follow up with your healthcare provider as directed:  Write down your questions so you remember to ask them during your visits  CARE AGREEMENT:   You have the right to help plan your care  Learn about your health condition and how it may be treated  Discuss treatment options with your caregivers to decide what care you want to receive  You always have the right to refuse treatment  The above information is an  only  It is not intended as medical advice for individual conditions or treatments  Talk to your doctor, nurse or pharmacist before following any medical regimen to see if it is safe and effective for you  © 2014 [address] is for End User's use only and may not be sold, redistributed or otherwise used for commercial purposes  All illustrations and images included in CareNotes® are the copyrighted property of A D A M [employer] Inc  or [employer]  Try taking Meclizine 12 5mg 1/2 tab three times daily if 1 tab makes you too tired

## 2019-08-26 NOTE — ASSESSMENT & PLAN NOTE
Lab Results   Component Value Date    HGBA1C 7 2 05/23/2019       No results for input(s): POCGLU in the last 72 hours  Blood Sugar Average: Last 72 hrs:       Recommend lifestyle modifications

## 2019-08-26 NOTE — PROGRESS NOTES
Assessment/Plan:  Problem List Items Addressed This Visit        Endocrine    Type 2 diabetes mellitus with hyperglycemia, without long-term current use of insulin ([facility] [address] )     Lab Results   Component Value Date    HGBA1C 7 2 05/23/2019       No results for input(s): POCGLU in the last 72 hours  Blood Sugar Average: Last 72 hrs:       Recommend lifestyle modifications  Cardiovascular and Mediastinum    Mild essential hypertension     Stable  Check blood pressure outside of office  Recommend lifestyle modifications  Other    Class 3 severe obesity due to excess calories with serious comorbidity and body mass index (BMI) of 40 0 to 44 9 in [facility])     Recommend lifestyle modifications  Other Visit Diagnoses     Benign paroxysmal positional vertigo due to bilateral vestibular disorder    -  Primary    Relevant Orders    Ambulatory referral to Physical Therapy    Try taking Meclizine 12 5mg 1/2 tab three times daily if 1 tab makes you too tired  Migraine without status migrainosus, not intractable, unspecified migraine type          Improved  Return if symptoms worsen or fail to improve  Future Appointments   Date Time Provider Alma Allred   8/27/2019  3:15 PM Aguilar Gustafson MD [facility]   9/9/2019  4:00 PM Charlotte Nieves, PT [facility]   9/25/2019 12:45 PM MICHEAL Raymond [facility]   11/14/2019  2:20 PM Heladio Ceja MD  [facility]        Subjective:     Eva Espinosa is a 40 y o  female who presents today for a follow-up on her acute medical conditions  HPI:  Chief Complaint   Patient presents with    Dizziness     x3 days  got dizzy after a migraine, and dizziness did not go away  did have ears cleaned and  felt better  feels like room spinning       -- Above per clinical staff and reviewed   --    HPI      Today:      PTO c  Alisha Strange    Dizziness - Symptoms x 2 days- Saturday at 5pm, associated c migraine that started 4pm - now resolved  In office, new migraine starting due to flourescent lights and cleaning odors  Occurred with laying down and standing up, room was spinning  Symptoms improving, still has symptoms c laying down, standing up, and going up stairs  Denies symptoms c moving her head  She usually gets a little dizzy with migraines, but then symptoms resolve  +Fluids  Seen at [place] yesterday  Patient states she had stable EKG  B/L ear lavage only temporarily helpful  Advised to push fluids  Rx Meclizine 12 5mg TID PRN - took last night, which made her tired and helped her sleep  Last took OTC migraine medicine yesterday at 5pm        2 Hr PP [employer]  Usually FBS:  150-179  No BP check outside of office  The following portions of the patient's history were reviewed and updated as appropriate: allergies, current medications, past family history, past medical history, past social history, past surgical history and problem list       Review of Systems   Constitutional: Negative for appetite change, chills, diaphoresis, fatigue and fever  Respiratory: Negative for chest tightness and shortness of breath  Cardiovascular: Negative for chest pain  Gastrointestinal: Positive for abdominal pain (Due to nausea) and nausea (Slight)  Negative for diarrhea and vomiting  Genitourinary: Negative for dysuria  Neurological: Positive for dizziness          Current Outpatient Medications   Medication Sig Dispense Refill    atorvastatin (LIPITOR) 10 mg tablet Take 1 tablet (10 mg total) by mouth daily 90 tablet 1    lisinopril (ZESTRIL) 10 mg tablet Take 1 tablet (10 mg total) by mouth daily For blood pressure 90 tablet 1    meclizine (ANTIVERT) 12 5 MG tablet Take 12 5 mg by mouth Three times daily as needed      metFORMIN (GLUCOPHAGE) 1000 MG tablet Take 1 tablet (1,000 mg total) by mouth 2 (two) times a day with meals 180 tablet 3    ONETOUCH DELICA LANCETS 73I MISC Check twice daily      ONETOUCH DELICA LANCETS FINE MISC TEST TWO TIMES A  each 1    ONETOUCH VERIO test strip USE AS DIRECTED TWO TIMES A  each 1    SYNVISC ONE 48 MG/6ML injection   0    TRULICITY 1 5 QB/4 2AK SOPN INJECT 0 5ML (1 5MG) UNDER THE SKIN ONCE WEEKLY 2 mL 2     No current facility-administered medications for this visit  Objective:  /82   Pulse 99   Temp 98 4 °F (36 9 °C) (Tympanic)   Resp 16   Ht 5' 9 02" (1 753 m)   Wt 129 kg (283 lb 9 6 oz)   SpO2 100%   BMI 41 86 kg/m²    Wt Readings from Last 3 Encounters:   08/26/19 129 kg (283 lb 9 6 oz)   06/19/19 132 kg (291 lb 9 6 oz)   06/14/19 128 kg (283 lb)      BP Readings from Last 3 Encounters:   08/26/19 128/82   06/19/19 142/90   06/07/19 128/90          Physical Exam   Constitutional: She is oriented to person, place, and time  She appears well-developed and well-nourished  HENT:   Head: Normocephalic and atraumatic  Right Ear: Tympanic membrane, external ear and ear canal normal    Left Ear: Tympanic membrane, external ear and ear canal normal    Nose: Nose normal  Right sinus exhibits no maxillary sinus tenderness and no frontal sinus tenderness  Left sinus exhibits no maxillary sinus tenderness and no frontal sinus tenderness  Mouth/Throat: Uvula is midline, oropharynx is clear and moist and mucous membranes are normal  No tonsillar exudate  Eyes: Pupils are equal, round, and reactive to light  Conjunctivae and EOM are normal    Neck: Neck supple  Cardiovascular: Normal rate, regular rhythm, normal heart sounds and intact distal pulses  Pulmonary/Chest: Effort normal and breath sounds normal    Musculoskeletal: She exhibits no edema or tenderness  Lymphadenopathy:     She has no cervical adenopathy  Neurological: She is alert and oriented to person, place, and time  She displays normal reflexes  No cranial nerve deficit or sensory deficit   She exhibits normal muscle tone  Coordination normal    +Nayely-Hallpike B/L  Skin: No rash noted  Psychiatric: She has a normal mood and affect  Her behavior is normal  Judgment and thought content normal    Nursing note and vitals reviewed  Lab Results:      Lab Results   Component Value Date    HGBA1C 7 2 05/23/2019     No results found for: URICACID  Invalid input(s): BASENAME Vitamin D    No results found       POCT Labs

## 2019-08-26 NOTE — TELEPHONE ENCOUNTER
Patient called, c/o dizziness that comes and goes, positional and non positional  Patient is nauseous but has not vomited  Patient saw Keshawn Amanda (see visit on file) yesterday, 8/25/19  EKG normal per patietn at that visit  Per patient her migraine has subsided since yesterday  Took meclinize last night and it made her sleepy and she slept all night  Unable to tell; if it helped her due to sleeping all night  Patient cannot take the meclizine at work because it makes her sleepy and shell fall asleep  Declines heart palpitations, but states her nikolai told her shes red in the face  Does not feel like she will pass out now  Patient is taking Lisinopril 10 mg  Blood sugar - has not taken since last night  Thinks it was a little high due to fast food ([employer]) 205  Declines and increased thirst, sweating profusely or blurred vision  Patient was originally offered a same day appointment with Dr Prachi Ennis by clerical however I advised patient I would speak to Dr Errol Valdes first to see if an appointment is warranted

## 2019-08-27 ENCOUNTER — OFFICE VISIT (OUTPATIENT)
Dept: OBGYN CLINIC | Facility: CLINIC | Age: 44
End: 2019-08-27
Payer: COMMERCIAL

## 2019-08-27 VITALS
SYSTOLIC BLOOD PRESSURE: 155 MMHG | DIASTOLIC BLOOD PRESSURE: 97 MMHG | HEIGHT: 69 IN | WEIGHT: 283 LBS | BODY MASS INDEX: 41.92 KG/M2 | HEART RATE: 87 BPM

## 2019-08-27 DIAGNOSIS — M17.12 PRIMARY OSTEOARTHRITIS OF LEFT KNEE: Primary | ICD-10-CM

## 2019-08-27 PROCEDURE — 20610 DRAIN/INJ JOINT/BURSA W/O US: CPT | Performed by: ORTHOPAEDIC SURGERY

## 2019-08-27 NOTE — PROGRESS NOTES
Patient Name:  Juvenal Casper  MR#:  [id number]    The patient presents for a hyaluronic acid injection      Large joint arthrocentesis: L knee  Date/Time: 8/27/2019 3:19 PM  Consent given by: patient  Site marked: site marked  Supporting Documentation  Indications: pain   Procedure Details  Location: knee - L knee  Needle size: 20 G  Approach: anterolateral  Medications administered: 48 mg hylan 48 MG/6ML    Patient tolerance: patient tolerated the procedure well with no immediate complications  Dressing:  Sterile dressing applied

## 2019-09-09 ENCOUNTER — EVALUATION (OUTPATIENT)
Dept: PHYSICAL THERAPY | Facility: CLINIC | Age: 44
End: 2019-09-09
Payer: COMMERCIAL

## 2019-09-09 VITALS — SYSTOLIC BLOOD PRESSURE: 126 MMHG | HEART RATE: 84 BPM | DIASTOLIC BLOOD PRESSURE: 86 MMHG

## 2019-09-09 DIAGNOSIS — H81.13 BENIGN PAROXYSMAL POSITIONAL VERTIGO DUE TO BILATERAL VESTIBULAR DISORDER: Primary | ICD-10-CM

## 2019-09-09 PROCEDURE — 97112 NEUROMUSCULAR REEDUCATION: CPT | Performed by: PHYSICAL THERAPIST

## 2019-09-09 PROCEDURE — 97162 PT EVAL MOD COMPLEX 30 MIN: CPT | Performed by: PHYSICAL THERAPIST

## 2019-09-09 PROCEDURE — 97140 MANUAL THERAPY 1/> REGIONS: CPT | Performed by: PHYSICAL THERAPIST

## 2019-09-09 NOTE — PROGRESS NOTES
PT Evaluation     Today's date: 2019  Patient name: Karina Yun  : 1975  MRN: [id number]  Referring provider: Judson Cespedes DO  Dx:   Encounter Diagnosis     ICD-10-CM    1  Benign paroxysmal positional vertigo due to bilateral vestibular disorder H81 13 Ambulatory referral to Physical Therapy                  Assessment  Assessment details: Pt presents with signs and symptoms synonymous of admitting diagnosis as well as L sided hypofunction of posterior SCC  Pt presents with episodic occurence, decreased strength, decreased range, flexibility, VOR speed and postural awareness  Pt would benefit skilled PT intervention in order to address these impairments in order to be able to perform all desired activities with minimal to nil symptom exacerbation  If they don't demonstrate improvement over the next 3-4 weeks they may benefit consultation of neurologist   Thank you very much for this kind and familiar referral      Impairments: abnormal or restricted ROM and poor posture   Understanding of Dx/Px/POC: good   Prognosis: good    Goals  STG 4 Weeks:  Decrease episode occurrence to 3x's a week  Improve range to CS by 5 all planes  VOR speed to 1 5bps  Improve strength to DNF 20"  Independent with HEP  LTG 8 Weeks:  Decrease episode occurrence minimal to nil  Improve range to 10 degrees of CS range by all planes    VOR speed to 2 bps  Improve strength to 30"  Able to perform all desired activities with minimal to nil symptom exacerbation      Plan  Patient would benefit from: skilled physical therapy  Planned modality interventions: cryotherapy and thermotherapy: hydrocollator packs  Planned therapy interventions: joint mobilization, massage, neuromuscular re-education, patient education, postural training, manual therapy, strengthening, stretching, therapeutic activities, therapeutic exercise, home exercise program, graded motor, graded exercise, graded activity, balance, flexibility, functional ROM exercises and gait training  Frequency: 2x week  Duration in weeks: 8  Treatment plan discussed with: patient        Subjective Evaluation    History of Present Illness  Date of onset: 9/9/2019  Mechanism of injury: Pt is a 40 yofemale who is RHD and presents today stating that she for the first time that she got dizzy after a Migraine on 8/24/19 which she does have a history of migraines  Pt reports when she went to lay down after medication, she went to lay on her R side, and immediately felt spinning but can't recall which way she felt spinning  She states that initial shock and spinning lasted an hour, but residual Fog was remaining for 1-2 days  Pt reports that the next day she went to Urgent care and was given Meclizine which she reports makes her sleepy  Pt went to see Dr Yajaira Downey who performed [facility] and it was + and she was referred to PT  She reports since then she has had a couple incidents which resolve in less than 10  She reports larger incidents can take a few hours  Pt reports that she is getting these symptoms almost everyday, certainly 8-3I'Y a week, 1 being a significantly worst  Pt reports that her goals are to be able to get rid of these symptoms and be able to function without being afraid to move her head  Pt wears glasses for seeing things far away  Pt denies bowel an bladder changes and numbness/tingling in LE  Quality of life: good    Pain  No pain reported  Relieving factors: change in position  Progression: no change      Diagnostic Tests  No diagnostic tests performed  Patient Goals  Patient/family treatment goals: decrease dizziness              Objective     Active Range of Motion   Cervical/Thoracic Spine       Cervical    Flexion: 35 degrees   Extension: 40 degrees      Left lateral flexion: 30 degrees      Right lateral flexion: 30 degrees      Left rotation: 70 degrees  Right rotation: 70 degrees           Additional Active Range of Motion Details  Forward head, rounded shoulders  B/L Sensation intact to light touch C3,4,5,6,7,8,T1,T2  UE screen WFL strong and painless  CS Screen WFL and with mild dizzy < 2" s/p moving  Palpation nil pain  Hypertrophy of UTs B  Ocular ROM more challenge with tracking to Pt's R, mod beating evident on R, small on L  Convergence/Divergence WFL  VOR to L 1, to R  5, sup/inf 1 5bps  STs Head Shake + mild beating to R, did not sustain speed  ,Head Thrust to L mild beat, to R x 3 mod beat , DHP to R (-), DHP to L +< Epley to the R for corrective and VOR speed ML improved by 1  0BPS      Sharp Isaac (-), Alar Lig intact, DNF 8"         Flowsheet Rows      Most Recent Value   PT/OT G-Codes   Current Score  52   Projected Score  82             Precautions: HTN, DM2    Daily Treatment Diary       Manual 9/9            DHP to L, Epley to R 10 min                                                                Exercise Diary             Scap Add 10" x10            CS retraction 10" x 10            Postural Review             VOR x 1 head shaking, 1 target ML Supinf             VOR x 1, Head stable 2 targets supinf ml             NBOS EC foam?             SLS B             Scaption                                                                                                                                                                                      Modalities

## 2019-09-11 ENCOUNTER — APPOINTMENT (OUTPATIENT)
Dept: PHYSICAL THERAPY | Facility: CLINIC | Age: 44
End: 2019-09-11
Payer: COMMERCIAL

## 2019-09-12 ENCOUNTER — OFFICE VISIT (OUTPATIENT)
Dept: PHYSICAL THERAPY | Facility: CLINIC | Age: 44
End: 2019-09-12
Payer: COMMERCIAL

## 2019-09-12 DIAGNOSIS — H81.13 BENIGN PAROXYSMAL POSITIONAL VERTIGO DUE TO BILATERAL VESTIBULAR DISORDER: Primary | ICD-10-CM

## 2019-09-12 PROCEDURE — 97140 MANUAL THERAPY 1/> REGIONS: CPT | Performed by: PHYSICAL THERAPIST

## 2019-09-12 PROCEDURE — 97112 NEUROMUSCULAR REEDUCATION: CPT | Performed by: PHYSICAL THERAPIST

## 2019-09-12 NOTE — PROGRESS NOTES
Daily Note     Today's date: 2019  Patient name: Carla Chand  : 1975  MRN: [id number]  Referring provider: Vijay Estrada DO  Dx:   Encounter Diagnosis     ICD-10-CM    1  Benign paroxysmal positional vertigo due to bilateral vestibular disorder H81 13                   Subjective: Pt reports Tuesday was pretty good and she was asymptomatic for almost 2 days  Today was pretty challenging, she states that she has had 3 episodes lasting 10-15 mins  Objective: See treatment diary below      Assessment:  Completely asymptomatic during progression of eye exercises, VOR speed is improving  Mild symptoms present during Epley correction, asymptomatic s/p intervetion  Proceed with [facility] balance base intervention n v  As able        Precautions: HTN, DM2    Daily Treatment Diary       Manual            DHP to L, Epley to R 10 min 10 min                                                                Exercise Diary             Scap Add 10" x10 10" x 10           CS retraction 10" x 10 10" x 10           Postural Review             VOR x 1 head shaking, 1 target ML Supinf  30"  X 2 nv           VOR x 1, Head stable 2 targets supinf ml  30" (x2) nv          NBOS EC foam?  nv           SLS B  20" x 2 B           Scaption  2 x 10                                                                                                                                                                                    Modalities

## 2019-09-15 DIAGNOSIS — E11.65 TYPE 2 DIABETES MELLITUS WITH HYPERGLYCEMIA, WITHOUT LONG-TERM CURRENT USE OF INSULIN (HCC): ICD-10-CM

## 2019-09-16 ENCOUNTER — OFFICE VISIT (OUTPATIENT)
Dept: PHYSICAL THERAPY | Facility: CLINIC | Age: 44
End: 2019-09-16
Payer: COMMERCIAL

## 2019-09-16 DIAGNOSIS — H81.13 BENIGN PAROXYSMAL POSITIONAL VERTIGO DUE TO BILATERAL VESTIBULAR DISORDER: Primary | ICD-10-CM

## 2019-09-16 PROCEDURE — 97140 MANUAL THERAPY 1/> REGIONS: CPT | Performed by: PHYSICAL THERAPIST

## 2019-09-16 PROCEDURE — 97112 NEUROMUSCULAR REEDUCATION: CPT | Performed by: PHYSICAL THERAPIST

## 2019-09-16 NOTE — PROGRESS NOTES
Daily Note     Today's date: 2019  Patient name: Anu Kim  : 1975  MRN: [id number]  Referring provider: Mukesh Brito DO  Dx:   Encounter Diagnosis     ICD-10-CM    1  Benign paroxysmal positional vertigo due to bilateral vestibular disorder H81 13                   Subjective: Pt presents today stating Friday and Saturday weren't good  She reports having several spells 10 mins at the most    she was without episode, she drove, she visited family  Pt reports that she had 1 10 minute episode but she isn't sure what could have potentially triggered it, it happened while she was at her desk  Objective: See treatment diary below      Assessment:  Enhanced endurance for VOR head stable and shaking activities, asymptomatic t/o entire session except during corrective procedure, asymptomatic upon sitting up  VOR quality in all planes improving, no unsteadiness, jumping or nystagmus with pursuit  Continue to progress as able       Precautions: HTN, DM2    Daily Treatment Diary       Manual           DHP to L, Epley to R 10 min 10 min  10 min                                                              Exercise Diary             Scap Add 10" x10 10" x 10 10" x 10          CS retraction 10" x 10 10" x 10 10" x 10           Postural Review             VOR x 1 head shaking, 1 target ML Supinf  30"  30" x 2           VOR x 1, Head stable 2 targets supinf ml  30" 30" x 2          NBOS EC foam?  nv NBOS EC Foam 1 min          SLS B  20" x 2 B 20"  2          Scaption  2 x 10 2 x 10           Karoke             Slow Head shake walk                                                                                                                                                            Modalities

## 2019-09-18 ENCOUNTER — OFFICE VISIT (OUTPATIENT)
Dept: PHYSICAL THERAPY | Facility: CLINIC | Age: 44
End: 2019-09-18
Payer: COMMERCIAL

## 2019-09-18 DIAGNOSIS — H81.13 BENIGN PAROXYSMAL POSITIONAL VERTIGO DUE TO BILATERAL VESTIBULAR DISORDER: Primary | ICD-10-CM

## 2019-09-18 PROCEDURE — 97112 NEUROMUSCULAR REEDUCATION: CPT | Performed by: PHYSICAL THERAPIST

## 2019-09-18 PROCEDURE — 97140 MANUAL THERAPY 1/> REGIONS: CPT | Performed by: PHYSICAL THERAPIST

## 2019-09-18 NOTE — PROGRESS NOTES
Daily Note     Today's date: 2019  Patient name: Hilary Reagan  : 1975  MRN: [id number]  Referring provider: Mata Rob DO  Dx:   Encounter Diagnosis     ICD-10-CM    1  Benign paroxysmal positional vertigo due to bilateral vestibular disorder H81 13                   Subjective: Pt presents today stating that yesterday she was completely asymptomatic, and today that she has had 2 episodes, one for 5, one for 1 hour  Otherwise as a whole she reports overall improvement since beginning PT  Objective: See treatment diary below      Assessment:  Trial of DHP to R with L Epley correction due to 3 trials with some improvement of symptoms dissipating  Today with this trial demonstrated abolishment of dizziness  Follow up n v  In regards of any residual symptoms       Precautions: HTN, DM2    Daily Treatment Diary       Manual          DHP to L, Epley to R 10 min 10 min  10 min DHP to R, Epley to L 10 min                                                             Exercise Diary             Scap Add 10" x10 10" x 10 10" x 10 10"x  10         CS retraction 10" x 10 10" x 10 10" x 10  10" x 10         Postural Review             VOR x 1 head shaking, 1 target ML Supinf  30"  30" x 2  1 min         VOR x 1, Head stable 2 targets supinf ml  30" 30" x 2 1 min         NBOS EC foam?  nv NBOS EC Foam 1 min NBOS Foam 1 min         SLS B  20" x 2 B 20"  2 20" x 2         Scaption  2 x 10 2 x 10  2 x 10         Karoke    4 laps         Slow Head shake walk    2 laps Sup/in, 2 laps ML                                                                                                                                                         Modalities

## 2019-09-19 LAB — HBA1C MFR BLD HPLC: 7.8 %

## 2019-09-24 NOTE — PROGRESS NOTES
Established Patient Progress Note      Chief Complaint   Patient presents with    Diabetes Type 2          History of Present Illness:   Juvenal Casper is a 40 y o  female with a history of HTN, HLD, and type 2 diabetes without long term use of insulin  Reports no complications of diabetes  Last A1C 7 8  She did not bring in BG log or meter to today's visit  She reports she is going for her Master's and has been under more stress recently  Been snacking more then she use do  Has been having late dinner  Has not been exercising  Denies recent illness or hospitalizations  Denies recent severe hypoglycemic or severe hyperglycemic episodes  Denies any issues with her current regimen  Home glucose monitoring: are performed regularly    Home blood glucose readings:   Before breakfast: 130-160s  Before lunch: 100s  Before dinner: 130-140s  Bedtime: 130-140s    Current regimen: Metformin 4,472 mg BID and Trulicity 1 5 mg   compliant all of the timedenies any side effects from current medications     Hypoglycemic episodes: No never   H/o of hypoglycemia causing hospitalization or Intervention such as glucagon injection or ambulance call No      Last Eye Exam: 01/26/19  Last Foot Exam: does not see podiatrist      Has hypertension: Taking Lisinopril   Has hyperlipidemia: Taking Atorvastatin           Patient Active Problem List   Diagnosis    Type 2 diabetes mellitus with hyperglycemia, without long-term current use of insulin (HCC)    Hyperlipidemia    Mild essential hypertension    Elevated TSH    Right wrist tendonitis    Class 3 severe obesity due to excess calories with serious comorbidity and body mass index (BMI) of 40 0 to 44 9 in adult ([facility] [address] )    Osteophyte of left knee    Primary osteoarthritis of left knee      Past Medical History:   Diagnosis Date    Arthritis     Benign essential hypertension     Resolved 8/16/2016     Diabetes mellitus ([facility] [address] )     Diabetes mellitus type 2, controlled (HCC)     Dyspnea and respiratory abnormality     Resolved 2/9/2015     Migraine     Varicella       Past Surgical History:   Procedure Laterality Date    HAND SURGERY  09/2014    KNEE ARTHROPLASTY Right     WISDOM TOOTH EXTRACTION        Family History   Problem Relation Age of Onset    Diabetes unspecified Mother     Diabetes Mother     Lupus Mother     Diabetes type II Mother     Diabetes unspecified Sister     Diabetes unspecified Paternal Grandfather     Arrhythmia Paternal Grandfather     Heart attack Paternal Grandfather     Heart disease Paternal Grandfather     Diabetes Sister     Cancer Paternal Grandmother     Breast cancer Paternal Grandmother         49-58s    Heart murmur Father     Other Father         Right leg amputation     Cancer Maternal Grandmother         Skin, also maybe breast     No Known Problems Maternal Grandfather      Social History     Tobacco Use    Smoking status: Never Smoker    Smokeless tobacco: Never Used   Substance Use Topics    Alcohol use: Yes     Comment: Once in a blue moon     Allergies   Allergen Reactions    Hydrocodone-Acetaminophen      Other reaction(s): BREATHING WAS FUNNY  Tingling sensation in mouth         Current Outpatient Medications:     atorvastatin (LIPITOR) 10 mg tablet, Take 1 tablet (10 mg total) by mouth daily, Disp: 90 tablet, Rfl: 1    Dulaglutide (TRULICITY) 1 5 UG/3 0WU SOPN, Inject 0 5 mL (1 5 mg total) under the skin once a week, Disp: 2 mL, Rfl: 6    glucose blood (ONETOUCH VERIO) test strip, Use as instructed, Disp: 150 each, Rfl: 3    lisinopril (ZESTRIL) 10 mg tablet, Take 1 tablet (10 mg total) by mouth daily For blood pressure, Disp: 90 tablet, Rfl: 1    metFORMIN (GLUCOPHAGE) 1000 MG tablet, Take 1 tablet (1,000 mg total) by mouth 2 (two) times a day with meals, Disp: 180 tablet, Rfl: 3    ONETOUCH DELICA LANCETS 78W MISC, Check twice daily, Disp: , Rfl:     ONETOUCH DELICA LANCETS FINE MISC, TEST TWO TIMES A DAY, Disp: 100 each, Rfl: 1    SYNVISC ONE 48 MG/6ML injection, , Disp: , Rfl: 0    meclizine (ANTIVERT) 12 5 MG tablet, Take 12 5 mg by mouth Three times daily as needed, Disp: , Rfl:     Review of Systems   Constitutional: Negative for chills and fever  HENT: Negative for sore throat and trouble swallowing  Eyes: Negative for visual disturbance  Respiratory: Negative for shortness of breath  Cardiovascular: Negative for chest pain and palpitations  Gastrointestinal: Negative for abdominal pain, constipation and diarrhea  Endocrine: Negative for cold intolerance, heat intolerance, polydipsia, polyphagia and polyuria  Genitourinary: Negative for frequency  Musculoskeletal: Negative for arthralgias and myalgias  Skin: Negative for rash  Neurological: Negative for dizziness and tremors  Hematological: Negative for adenopathy  Psychiatric/Behavioral: Negative for sleep disturbance  All other systems reviewed and are negative  Physical Exam:  Body mass index is 42 91 kg/m²  /78   Pulse 85   Ht 5' 9" (1 753 m)   Wt 132 kg (290 lb 9 6 oz)   BMI 42 91 kg/m²    Wt Readings from Last 3 Encounters:   09/25/19 132 kg (290 lb 9 6 oz)   08/27/19 128 kg (283 lb)   08/26/19 129 kg (283 lb 9 6 oz)       Physical Exam   Constitutional: She is oriented to person, place, and time  She appears well-developed and well-nourished  No distress  HENT:   Head: Normocephalic and atraumatic  Eyes: Pupils are equal, round, and reactive to light  Conjunctivae are normal    Neck: Normal range of motion  Neck supple  No thyromegaly present  Cardiovascular: Normal rate, regular rhythm and normal heart sounds  Pulses are no weak pulses  Pulses:       Dorsalis pedis pulses are 2+ on the right side, and 2+ on the left side  Pulmonary/Chest: Effort normal and breath sounds normal  No respiratory distress  She has no wheezes  She has no rales  Abdominal: Soft   Bowel sounds are normal  She exhibits no distension  There is no tenderness  Musculoskeletal: Normal range of motion  She exhibits no edema  Feet:   Right Foot:   Skin Integrity: Positive for callus and dry skin  Negative for ulcer, skin breakdown, erythema or warmth  Left Foot:   Skin Integrity: Positive for callus and dry skin  Negative for ulcer, skin breakdown, erythema or warmth  Neurological: She is alert and oriented to person, place, and time  Skin: Skin is warm and dry  Psychiatric: She has a normal mood and affect  Vitals reviewed  Patient's shoes and socks removed  Right Foot/Ankle   Right Foot Inspection  Skin Exam: skin normal, dry skin, callus and callus skin not intact, no warmth, no erythema, no maceration, no abnormal color, no pre-ulcer and no ulcer                            Sensory       Monofilament testing: intact  Vascular    The right DP pulse is 2+  Left Foot/Ankle  Left Foot Inspection  Skin Exam: skin normal, dry skin and callusskin not intact, no warmth, no erythema, no maceration, normal color, no pre-ulcer and no ulcer                                         Sensory       Monofilament: intact  Vascular    The left DP pulse is 2+  Assign Risk Category:  No deformity present; No loss of protective sensation; No weak pulses       Risk: 0      Labs:     Lab Results   Component Value Date    HGBA1C 7 8 09/19/2019         Impression & Plan:    Problem List Items Addressed This Visit        Endocrine    Type 2 diabetes mellitus with hyperglycemia, without long-term current use of insulin ([address] ) - Primary     Above goal due to dietary excursions and frequent snacking  Recommended avoiding snack before bed  Continue current regimen for now  Referral given for MNT  Referral given for podiatrist  Focus on dietary and lifestyle modifications            Relevant Medications    Dulaglutide (TRULICITY) 1 5 VX/0 9NF SOPN    glucose blood (ONETOUCH VERIO) test strip    metFORMIN (GLUCOPHAGE) 1000 MG tablet    Other Relevant Orders    Hemoglobin A1C    Comprehensive metabolic panel    Lipid Panel with Direct LDL reflex    Microalbumin / creatinine urine ratio    T4, free    TSH, 3rd generation    Ambulatory referral to medical nutrition therapy for diabetes    Ambulatory referral to Podiatry       Cardiovascular and Mediastinum    Mild essential hypertension     BP stable, continue current regimen          Relevant Orders    Comprehensive metabolic panel       Other    Hyperlipidemia     Stable, continue statin          Relevant Orders    Lipid Panel with Direct LDL reflex    Elevated TSH    Relevant Orders    T4, free    TSH, 3rd generation          Orders Placed This Encounter   Procedures    Hemoglobin A1C     Standing Status:   Future     Standing Expiration Date:   9/25/2020    Comprehensive metabolic panel     This is a patient instruction: Patient fasting for 8 hours or longer recommended  Standing Status:   Future     Standing Expiration Date:   9/25/2020    Lipid Panel with Direct LDL reflex     This is a patient instruction: This test requires patient fasting for 10-12 hours or longer  Drinking of black coffee or black tea is acceptable  Standing Status:   Future     Standing Expiration Date:   9/25/2020    Microalbumin / creatinine urine ratio     Standing Status:   Future     Standing Expiration Date:   9/25/2020    T4, free     Standing Status:   Future     Standing Expiration Date:   12/25/2019    TSH, 3rd generation     This is a patient instruction: This test is non-fasting  Please drink two glasses of water morning of bloodwork          Standing Status:   Future     Standing Expiration Date:   12/25/2019    Ambulatory referral to medical nutrition therapy for diabetes     Standing Status:   Future     Standing Expiration Date:   9/25/2020     Referral Priority:   Routine     Referral Type:   Consult - AMB     Referral Reason:   Specialty Services Required     Requested Specialty:   Endocrinology     Number of Visits Requested:   1     Expiration Date:   9/25/2020    Ambulatory referral to Podiatry     Standing Status:   Future     Standing Expiration Date:   9/25/2020     Referral Priority:   Routine     Referral Type:   Consult - AMB     Referral Reason:   Specialty Services Required     Referred to Provider:   Henny Crespo DPM     Requested Specialty:   Podiatry     Number of Visits Requested:   1     Expiration Date:   9/25/2020         Discussed with the patient and all questioned fully answered  She will call me if any problems arise  Follow-up appointment in 3 months       Counseled patient on diagnostic results, prognosis, risk and benefit of treatment options, instruction for management, importance of treatment compliance, Risk  factor reduction and impressions      Fabio Loera [address]

## 2019-09-25 ENCOUNTER — OFFICE VISIT (OUTPATIENT)
Dept: PHYSICAL THERAPY | Facility: CLINIC | Age: 44
End: 2019-09-25
Payer: COMMERCIAL

## 2019-09-25 ENCOUNTER — OFFICE VISIT (OUTPATIENT)
Dept: ENDOCRINOLOGY | Facility: CLINIC | Age: 44
End: 2019-09-25
Payer: COMMERCIAL

## 2019-09-25 VITALS
HEART RATE: 85 BPM | SYSTOLIC BLOOD PRESSURE: 122 MMHG | DIASTOLIC BLOOD PRESSURE: 78 MMHG | BODY MASS INDEX: 43.04 KG/M2 | HEIGHT: 69 IN | WEIGHT: 290.6 LBS

## 2019-09-25 DIAGNOSIS — H81.13 BENIGN PAROXYSMAL POSITIONAL VERTIGO DUE TO BILATERAL VESTIBULAR DISORDER: Primary | ICD-10-CM

## 2019-09-25 DIAGNOSIS — E11.65 TYPE 2 DIABETES MELLITUS WITH HYPERGLYCEMIA, WITHOUT LONG-TERM CURRENT USE OF INSULIN (HCC): Primary | ICD-10-CM

## 2019-09-25 DIAGNOSIS — E78.5 HYPERLIPIDEMIA, UNSPECIFIED HYPERLIPIDEMIA TYPE: ICD-10-CM

## 2019-09-25 DIAGNOSIS — R79.89 ELEVATED TSH: ICD-10-CM

## 2019-09-25 DIAGNOSIS — I10 MILD ESSENTIAL HYPERTENSION: ICD-10-CM

## 2019-09-25 DIAGNOSIS — E11.65 TYPE 2 DIABETES MELLITUS WITH HYPERGLYCEMIA, WITHOUT LONG-TERM CURRENT USE OF INSULIN (HCC): ICD-10-CM

## 2019-09-25 PROCEDURE — 99214 OFFICE O/P EST MOD 30 MIN: CPT | Performed by: NURSE PRACTITIONER

## 2019-09-25 PROCEDURE — 3078F DIAST BP <80 MM HG: CPT | Performed by: NURSE PRACTITIONER

## 2019-09-25 PROCEDURE — 97112 NEUROMUSCULAR REEDUCATION: CPT | Performed by: PHYSICAL THERAPIST

## 2019-09-25 PROCEDURE — 3074F SYST BP LT 130 MM HG: CPT | Performed by: NURSE PRACTITIONER

## 2019-09-25 NOTE — ASSESSMENT & PLAN NOTE
Above goal due to dietary excursions and frequent snacking  Recommended avoiding snack before bed  Continue current regimen for now  Referral given for MNT  Referral given for podiatrist  Focus on dietary and lifestyle modifications

## 2019-09-25 NOTE — PROGRESS NOTES
Daily Note     Today's date: 2019  Patient name: Radha Garcia  : 1975  MRN: [id number]  Referring provider: Elizabet Parks DO  Dx:   Encounter Diagnosis     ICD-10-CM    1  Benign paroxysmal positional vertigo due to bilateral vestibular disorder H81 13                   Subjective: Pt presents today stating she is feeling good today  4 days without an episode  Objective: See treatment diary below      Assessment: If pt continues to not have any symptoms, perhaps dc n v  Is most appropriate        Precautions: HTN, DM2    Daily Treatment Diary       Manual         DHP to L, Epley to R 10 min 10 min  10 min DHP to R, Epley to L 10 min np asymptomatic t/o                                                             Exercise Diary             Scap Add 10" x10 10" x 10 10" x 10 10"x  10 10" x 10        CS retraction 10" x 10 10" x 10 10" x 10  10" x 10 10" x 10        Postural Review             VOR x 1 head shaking, 1 target ML Supinf  30"  30" x 2  1 min 1 min        VOR x 1, Head stable 2 targets supinf ml  30" 30" x 2 1 min 1 min        NBOS EC foam?  nv NBOS EC Foam 1 min NBOS Foam 1 min NBOS foam 1 min         SLS B  20" x 2 B 20"  2 20" x 2 20" x 2        Scaption  2 x 10 2 x 10  2 x 10 2 x 10        Karoke    4 laps 5 laps        Slow Head shake walk    2 laps Sup/in, 2 laps ML  2 laps " "                                                                                                                                                       Modalities

## 2019-09-26 DIAGNOSIS — E11.65 TYPE 2 DIABETES MELLITUS WITH HYPERGLYCEMIA, WITHOUT LONG-TERM CURRENT USE OF INSULIN (HCC): ICD-10-CM

## 2019-09-26 RX ORDER — DULAGLUTIDE 1.5 MG/.5ML
INJECTION, SOLUTION SUBCUTANEOUS
Qty: 2 ML | Refills: 2 | Status: SHIPPED | OUTPATIENT
Start: 2019-09-26 | End: 2019-11-12

## 2019-09-27 ENCOUNTER — APPOINTMENT (OUTPATIENT)
Dept: PHYSICAL THERAPY | Facility: CLINIC | Age: 44
End: 2019-09-27
Payer: COMMERCIAL

## 2019-10-04 ENCOUNTER — APPOINTMENT (EMERGENCY)
Dept: RADIOLOGY | Facility: HOSPITAL | Age: 44
End: 2019-10-04
Payer: COMMERCIAL

## 2019-10-04 ENCOUNTER — HOSPITAL ENCOUNTER (EMERGENCY)
Facility: HOSPITAL | Age: 44
Discharge: HOME | End: 2019-10-04
Attending: EMERGENCY MEDICINE | Admitting: EMERGENCY MEDICINE
Payer: COMMERCIAL

## 2019-10-04 VITALS
WEIGHT: 290.13 LBS | TEMPERATURE: 97.7 F | DIASTOLIC BLOOD PRESSURE: 69 MMHG | HEIGHT: 68 IN | RESPIRATION RATE: 20 BRPM | OXYGEN SATURATION: 98 % | HEART RATE: 83 BPM | SYSTOLIC BLOOD PRESSURE: 146 MMHG | BODY MASS INDEX: 43.97 KG/M2

## 2019-10-04 DIAGNOSIS — R09.89 GLOBUS SENSATION: Primary | ICD-10-CM

## 2019-10-04 PROCEDURE — 99283 EMERGENCY DEPT VISIT LOW MDM: CPT

## 2019-10-04 PROCEDURE — 99283 EMERGENCY DEPT VISIT LOW MDM: CPT | Performed by: EMERGENCY MEDICINE

## 2019-10-04 PROCEDURE — 70360 X-RAY EXAM OF NECK: CPT

## 2019-10-04 RX ORDER — MAGNESIUM HYDROXIDE/ALUMINUM HYDROXICE/SIMETHICONE 120; 1200; 1200 MG/30ML; MG/30ML; MG/30ML
30 SUSPENSION ORAL ONCE
Status: COMPLETED | OUTPATIENT
Start: 2019-10-04 | End: 2019-10-04

## 2019-10-04 RX ORDER — LIDOCAINE HYDROCHLORIDE 20 MG/ML
15 SOLUTION OROPHARYNGEAL ONCE
Status: COMPLETED | OUTPATIENT
Start: 2019-10-04 | End: 2019-10-04

## 2019-10-04 RX ADMIN — ALUMINUM HYDROXIDE, MAGNESIUM HYDROXIDE, AND SIMETHICONE 30 ML: 200; 200; 20 SUSPENSION ORAL at 18:27

## 2019-10-04 RX ADMIN — LIDOCAINE HYDROCHLORIDE 15 ML: 20 SOLUTION ORAL; TOPICAL at 18:27

## 2019-10-04 NOTE — ED ATTENDING ATTESTATION
10/4/2019  I, Sheri Celeste MD, saw and evaluated the patient  I have discussed the patient with the resident/non-physician practitioner and agree with the resident's/non-physician practitioner's findings, Plan of Care, and MDM as documented in the resident's/non-physician practitioner's note, except where noted  All available labs and Radiology studies were reviewed  I was present for key portions of any procedure(s) performed by the resident/non-physician practitioner and I was immediately available to provide assistance  At this point I agree with the current assessment done in the Emergency Department    I have conducted an independent evaluation of this patient a history and physical is as follows:    ED Course         Critical Care Time  Procedures

## 2019-10-04 NOTE — ED PROVIDER NOTES
History  Chief Complaint   Patient presents with    Foreign Body in Throat     Pt  c/o swallowed a chicken bone yesterday around 1700 yesterday  42-year-old female presents to the emergency room for throat pain  Patient reports yesterday at about 5:00 p m  while eating chicken for dinner, she noticed a sharp stabbing sensation in her throat  She believes she swallowed a chicken bone  She is able to eat food and swallow, and her last meal was a bagel she had for breakfast earlier today  Pain is constant, 4/10 in severity but worsens with swallowing to a 8/10  Pain will last a few seconds, and then goes away  She explains the pain is in her throat  Denies fevers, chills, associated shortness of breath, ELIZONDO, chest pain, abdominal pain, nausea, vomiting, diarrhea, or blood in the stool  History provided by:  Patient   used: No        Prior to Admission Medications   Prescriptions Last Dose Informant Patient Reported? Taking?    Dulaglutide (TRULICITY) 1 5 GN/0 1CY SOPN   No No   Sig: Inject 0 5 mL (1 5 mg total) under the skin once a week   ONETOUCH DELICA LANCETS 39E MISC   Yes No   Sig: Check twice daily   ONETOUCH DELICA LANCETS FINE MISC   No No   Sig: TEST TWO TIMES A DAY   SYNVISC ONE 48 MG/6ML injection   Yes No   TRULICITY 1 5 EY/0 5HF SOPN   No No   Sig: INJECT 0 5 ML (1 5MG) UNDER THE SKIN ONCE WEEKLY   atorvastatin (LIPITOR) 10 mg tablet   No No   Sig: Take 1 tablet (10 mg total) by mouth daily   glucose blood (ONETOUCH VERIO) test strip   No No   Si each by Other route 2 (two) times a day Use as instructed   lisinopril (ZESTRIL) 10 mg tablet   No No   Sig: Take 1 tablet (10 mg total) by mouth daily For blood pressure   meclizine (ANTIVERT) 12 5 MG tablet   Yes No   Sig: Take 12 5 mg by mouth Three times daily as needed   metFORMIN (GLUCOPHAGE) 1000 MG tablet   No No   Sig: Take 1 tablet (1,000 mg total) by mouth 2 (two) times a day with meals      Facility-Administered Medications: None       Past Medical History:   Diagnosis Date    Arthritis     Benign essential hypertension     Resolved 8/16/2016     Diabetes mellitus ([facility] [address]     Diabetes mellitus type 2, controlled ([facility] )     Dyspnea and respiratory abnormality     Resolved 2/9/2015     Migraine     Varicella        Past Surgical History:   Procedure Laterality Date    HAND SURGERY  09/2014    KNEE ARTHROPLASTY Right     WISDOM TOOTH EXTRACTION         Family History   Problem Relation Age of Onset    Diabetes unspecified Mother     Diabetes Mother     Lupus Mother     Diabetes type II Mother     Diabetes unspecified Sister     Diabetes unspecified Paternal Grandfather     Arrhythmia Paternal Grandfather     Heart attack Paternal Grandfather     Heart disease Paternal Grandfather     Diabetes Sister     Cancer Paternal Grandmother     Breast cancer Paternal Grandmother         49-58s    Heart murmur Father     Other Father         Right leg amputation     Cancer Maternal Grandmother         Skin, also maybe breast     No Known Problems Maternal Grandfather      I have reviewed and agree with the history as documented  Social History     Tobacco Use    Smoking status: Never Smoker    Smokeless tobacco: Never Used   Substance Use Topics    Alcohol use: Yes     Comment: Once in a blue moon    Drug use: No        Review of Systems   Constitutional: Negative for appetite change  HENT: Negative for drooling and trouble swallowing  Eyes: Negative for visual disturbance  Respiratory: Negative for choking, chest tightness, wheezing and stridor  Gastrointestinal: Negative for blood in stool  Endocrine: Negative for polyphagia  Musculoskeletal: Negative for neck pain  Skin: Negative for wound  Neurological: Negative for speech difficulty, light-headedness and numbness  Psychiatric/Behavioral: The patient is not nervous/anxious          Physical Exam  ED Triage Vitals [10/04/19 1722] Temperature Pulse Respirations Blood Pressure SpO2   97 7 °F (36 5 °C) 83 20 146/69 98 %      Temp src Heart Rate Source Patient Position - Orthostatic VS BP Location FiO2 (%)   -- -- -- -- --      Pain Score       Worst Possible Pain             Orthostatic Vital Signs  Vitals:    10/04/19 1722   BP: 146/69   Pulse: 83       Physical Exam   Constitutional: She appears well-developed and well-nourished  No distress  HENT:   Head: Normocephalic and atraumatic  Mouth/Throat: No oropharyngeal exudate  No tonsillar, hypertrophy, or abrasions  No pharyngeal erythema or abrasions appreciated  Oral mucosa moist    Eyes: Conjunctivae are normal  Right eye exhibits no discharge  Left eye exhibits no discharge  No scleral icterus  Neck: Normal range of motion  Neck supple  No tracheal deviation present  No thyromegaly present  Cardiovascular: Normal rate, regular rhythm, normal heart sounds and intact distal pulses  Exam reveals no gallop and no friction rub  No murmur heard  Pulmonary/Chest: Effort normal and breath sounds normal  No stridor  No respiratory distress  She has no wheezes  She has no rales  She exhibits no tenderness  Neurological: She is alert  Skin: Skin is warm and dry  Capillary refill takes less than 2 seconds  She is not diaphoretic  Psychiatric: She has a normal mood and affect  Her behavior is normal    Nursing note and vitals reviewed  ED Medications  Medications   aluminum-magnesium hydroxide-simethicone (MYLANTA) 200-200-20 mg/5 mL oral suspension 30 mL (has no administration in time range)   Lidocaine Viscous HCl (XYLOCAINE) 2 % mucosal solution 15 mL (has no administration in time range)       Diagnostic Studies  Results Reviewed     None                 XR neck soft tissue   Final Result by Stewart Corona MD (10/04 1821)      Unremarkable study  If there is persistent clinical concern for a 4 body within the aerodigestive tract, consider CT evaluation  Workstation performed: [id number]               Procedures  Procedures        ED Course                               MDM  Number of Diagnoses or Management Options  Globus sensation:   Diagnosis management comments: 40year old female complains of pain in her throat after swallowing a chicken bone  Pain worsens with swallowing  No esophageal obstruction; patient is tolerating PO without vomiting or drooling  Soft tissue XR of the neck does not reveal any FB in the esophagus  Patient likely has abraison in the esophagus that will heal with time  Gave maalox and viscous lidocaine to help with the pain  Recommend patient take tylenol or motrin for persistent pain  Advised patient to return to the ED if symptoms worsen or cannot swallow  Amount and/or Complexity of Data Reviewed  Tests in the radiology section of CPT®: ordered and reviewed    Risk of Complications, Morbidity, and/or Mortality  Presenting problems: minimal  Diagnostic procedures: minimal  Management options: minimal    Patient Progress  Patient progress: stable      Disposition  Final diagnoses:   Globus sensation     Time reflects when diagnosis was documented in both MDM as applicable and the Disposition within this note     Time User Action Codes Description Comment    10/4/2019  6:20 PM Karl Johnson Add [R09 [id number]] Globus sensation       ED Disposition     ED Disposition Condition Date/Time Comment    Discharge Stable Fri Oct 4, 2019  6:21 PM Mary Last discharge to home/self care  Follow-up Information     Follow up With Specialties Details Why Contact Info Additional [address] Emergency Department Emergency Medicine Go to  If symptoms worsen [address] [address]  [phone] [facility], [address], [place], [place], [place] Banuelos MD Family Medicine  As needed [address]    [address]  [address] [address] Patient's Medications   Discharge Prescriptions    No medications on file     No discharge procedures on file  ED Provider  Attending physically available and evaluated Peder Alhaji PATEL managed the patient along with the ED Attending      Electronically Signed by Kaylie Arcos DO  10/04/19 , 6:27 PM           Kaylie Arcos DO  10/04/19 9579

## 2019-10-10 ENCOUNTER — TELEPHONE (OUTPATIENT)
Dept: OBGYN CLINIC | Facility: CLINIC | Age: 44
End: 2019-10-10

## 2019-10-10 NOTE — TELEPHONE ENCOUNTER
Called and [facility] for patient to return our call for rescheduling of her appointment with Dr Odonnell, per her request

## 2019-10-17 NOTE — PROGRESS NOTES
PT Discharge    Today's date: 10/17/2019  Patient name: Juvenal Casper  : 1975  MRN: [id number]  Referring provider: Natalie Davis DO  Dx:   Encounter Diagnosis     ICD-10-CM    1  Benign paroxysmal positional vertigo due to bilateral vestibular disorder H81 13        Start Time: 1625  Stop Time: 1655  Total time in clinic (min): 30 minutes    Assessment/Plan  Pt has not been present since 19  Pt's chart will be DC in compliance of facility policy as all Charts are DC within 30 days of last scheduled visit          Subjective    Objective    Flowsheet Rows      Most Recent Value   PT/OT G-Codes   Current Score  97   Projected Score  82

## 2019-10-21 ENCOUNTER — TELEPHONE (OUTPATIENT)
Dept: OBGYN CLINIC | Facility: CLINIC | Age: 44
End: 2019-10-21

## 2019-10-21 NOTE — TELEPHONE ENCOUNTER
Called again to reschedule patient's canceled appointment and her son said that it's best to call back after 4pm   Will try again later

## 2019-10-31 ENCOUNTER — TELEPHONE (OUTPATIENT)
Dept: OBGYN CLINIC | Facility: CLINIC | Age: 44
End: 2019-10-31

## 2019-11-07 NOTE — TELEPHONE ENCOUNTER
11/7-Called and left a message for the patient to return our call if rescheduling is still needed from her cancelled appointment on 10/8 (6wkfu left knee) for Dr Annmarie Altamirano

## 2019-11-10 DIAGNOSIS — E78.5 HYPERLIPIDEMIA, UNSPECIFIED HYPERLIPIDEMIA TYPE: ICD-10-CM

## 2019-11-10 DIAGNOSIS — I10 MILD ESSENTIAL HYPERTENSION: ICD-10-CM

## 2019-11-11 NOTE — TELEPHONE ENCOUNTER
Medication: Lipitor 10 mg  Last refilled: 5/30/19  Last Office Visit: 8/26/19  Next Office Visit: 11/12/19  Pharmacy:     Medication: Zestril 10 mg   Last refilled: 5/15/19  Last Office Visit: 8/26/19  Next Office Visit: 11/12/19  Pharmacy:

## 2019-11-12 ENCOUNTER — OFFICE VISIT (OUTPATIENT)
Dept: FAMILY MEDICINE CLINIC | Facility: CLINIC | Age: 44
End: 2019-11-12
Payer: COMMERCIAL

## 2019-11-12 VITALS
TEMPERATURE: 98.7 F | RESPIRATION RATE: 18 BRPM | WEIGHT: 290.8 LBS | HEIGHT: 68 IN | OXYGEN SATURATION: 98 % | BODY MASS INDEX: 44.07 KG/M2 | SYSTOLIC BLOOD PRESSURE: 118 MMHG | HEART RATE: 96 BPM | DIASTOLIC BLOOD PRESSURE: 82 MMHG

## 2019-11-12 DIAGNOSIS — E78.5 HYPERLIPIDEMIA, UNSPECIFIED HYPERLIPIDEMIA TYPE: ICD-10-CM

## 2019-11-12 DIAGNOSIS — Z00.00 ENCOUNTER FOR PREVENTIVE CARE: Primary | ICD-10-CM

## 2019-11-12 DIAGNOSIS — I10 MILD ESSENTIAL HYPERTENSION: ICD-10-CM

## 2019-11-12 DIAGNOSIS — E11.65 TYPE 2 DIABETES MELLITUS WITH HYPERGLYCEMIA, WITHOUT LONG-TERM CURRENT USE OF INSULIN (HCC): ICD-10-CM

## 2019-11-12 PROCEDURE — 4010F ACE/ARB THERAPY RXD/TAKEN: CPT | Performed by: FAMILY MEDICINE

## 2019-11-12 PROCEDURE — 1036F TOBACCO NON-USER: CPT | Performed by: FAMILY MEDICINE

## 2019-11-12 PROCEDURE — 99214 OFFICE O/P EST MOD 30 MIN: CPT | Performed by: FAMILY MEDICINE

## 2019-11-12 RX ORDER — LISINOPRIL 10 MG/1
10 TABLET ORAL DAILY
Qty: 90 TABLET | Refills: 1 | OUTPATIENT
Start: 2019-11-12

## 2019-11-12 RX ORDER — LISINOPRIL 10 MG/1
10 TABLET ORAL DAILY
Qty: 90 TABLET | Refills: 1 | Status: SHIPPED | OUTPATIENT
Start: 2019-11-12 | End: 2020-05-03 | Stop reason: SDUPTHER

## 2019-11-12 RX ORDER — ATORVASTATIN CALCIUM 10 MG/1
10 TABLET, FILM COATED ORAL DAILY
Qty: 90 TABLET | Refills: 1 | OUTPATIENT
Start: 2019-11-12

## 2019-11-12 RX ORDER — ATORVASTATIN CALCIUM 10 MG/1
10 TABLET, FILM COATED ORAL DAILY
Qty: 90 TABLET | Refills: 1 | Status: SHIPPED | OUTPATIENT
Start: 2019-11-12 | End: 2020-05-12 | Stop reason: SDUPTHER

## 2019-11-12 NOTE — PROGRESS NOTES
Assessment/Plan:       Problem List Items Addressed This Visit        Endocrine    Type 2 diabetes mellitus with hyperglycemia, without long-term current use of insulin ([facility] )       Lab Results   Component Value Date    HGBA1C 7 8 09/19/2019   she is not sure she still has endo lab slip-- reprinted and encouraged her to be sure to have this done prior to endo appointment  Cardiovascular and Mediastinum    Mild essential hypertension     Stable on lisinopril   Doing well         Relevant Medications    lisinopril (ZESTRIL) 10 mg tablet       Other    Hyperlipidemia     Labwork is ordered for December Reviewed diet, exercise  On atorvastatin         Relevant Medications    atorvastatin (LIPITOR) 10 mg tablet      Other Visit Diagnoses     Encounter for preventive care    -  Primary    Relevant Orders    Ambulatory referral to Obstetrics / Gynecology             Most recent labs reviewed       Subjective:     Jennifer Segal is a 40 y o  female here today and has the below chronic conditions:    Patient Active Problem List   Diagnosis    Type 2 diabetes mellitus with hyperglycemia, without long-term current use of insulin ([facility] )    Hyperlipidemia    Mild essential hypertension    Elevated TSH    Right wrist tendonitis    Class 3 severe obesity due to excess calories with serious comorbidity and body mass index (BMI) of 40 0 to 44 9 in adult ([facility] )    Osteophyte of left knee    Primary osteoarthritis of left knee     Current Outpatient Medications   Medication Sig Dispense Refill    atorvastatin (LIPITOR) 10 mg tablet Take 1 tablet (10 mg total) by mouth daily 90 tablet 1    Dulaglutide (TRULICITY) 1 5 NU/1 4DW SOPN Inject 0 5 mL (1 5 mg total) under the skin once a week 2 mL 6    glucose blood (ONETOUCH VERIO) test strip 1 each by Other route 2 (two) times a day Use as instructed 200 each 1    lisinopril (ZESTRIL) 10 mg tablet Take 1 tablet (10 mg total) by mouth daily For blood pressure 90 tablet 1    meclizine (ANTIVERT) 12 5 MG tablet Take 12 5 mg by mouth Three times daily as needed      metFORMIN (GLUCOPHAGE) 1000 MG tablet Take 1 tablet (1,000 mg total) by mouth 2 (two) times a day with meals 180 tablet 3    ONETOUCH DELICA LANCETS 59N MISC Check twice daily      ONETOUCH DELICA LANCETS FINE MISC TEST TWO TIMES A  each 1     No current facility-administered medications for this visit  HPI:  Chief Complaint   Patient presents with    Follow-up     no concerns  - CC above per clinical staff and reviewed  Pt here for follow up w   She is doing well  Due for labs end of December for endo  She is monitoring BS  AM is around 160  Evenings can be lower  No hypoglycemia  Diet- trying    She is in school  This is going well  Vertigo has been better  Taking meds regularly  No side effects    No lightheadedness  Ros neg as noted below  Some stress caring for her dad  The following portions of the patient's history were reviewed and updated as appropriate: allergies, current medications, past family history, past medical history, past social history, past surgical history and problem list     ROS:  Review of Systems   No fever, chills, congestion, chest pain, shortness of breath, nausea, vomiting, diarrhea, constipation, blood in stool, urinary concerns, mood changes  Rest of ROS neg except as above  Objective:      /82   Pulse 96   Temp 98 7 °F (37 1 °C) (Tympanic)   Resp 18   Ht 5' 8" (1 727 m)   Wt 132 kg (290 lb 12 8 oz)   SpO2 98%   BMI 44 22 kg/m²   BP Readings from Last 3 Encounters:   11/12/19 118/82   10/04/19 146/69   09/25/19 122/78     Wt Readings from Last 3 Encounters:   11/12/19 132 kg (290 lb 12 8 oz)   10/04/19 132 kg (290 lb 2 oz)   09/25/19 132 kg (290 lb 9 6 oz)               Physical Exam:   Physical Exam   Constitutional: She is oriented to person, place, and time  She appears well-developed     obese   HENT:   Head: Normocephalic and atraumatic  Neck: Neck supple  Cardiovascular: Normal rate, regular rhythm and normal heart sounds  No murmur heard  Pulmonary/Chest: Effort normal and breath sounds normal  No respiratory distress  She has no wheezes  Abdominal: Soft  There is no tenderness  There is no rebound and no guarding  Musculoskeletal: She exhibits no edema  Lymphadenopathy:     She has no cervical adenopathy  Neurological: She is alert and oriented to person, place, and time  Skin: Skin is warm and dry  Psychiatric: She has a normal mood and affect  Her behavior is normal    Nursing note and vitals reviewed

## 2019-11-13 PROBLEM — G43.909 MIGRAINES: Status: ACTIVE | Noted: 2019-11-13

## 2019-11-14 NOTE — ASSESSMENT & PLAN NOTE
Lab Results   Component Value Date    HGBA1C 7 8 09/19/2019   she is not sure she still has endo lab slip-- reprinted and encouraged her to be sure to have this done prior to endo appointment

## 2020-01-03 LAB
CREAT ?TM UR-SCNC: 158 UMOL/L
EXT MICROALBUMIN URINE RANDOM: 1.4
HBA1C MFR BLD HPLC: 8.3 %
MICROALBUMIN/CREAT UR: 8.9 MG/G{CREAT}

## 2020-01-29 ENCOUNTER — TELEPHONE (OUTPATIENT)
Dept: ADMINISTRATIVE | Facility: OTHER | Age: 45
End: 2020-01-29

## 2020-01-29 NOTE — TELEPHONE ENCOUNTER
----- Message from Moise Bo LPN sent at 3/40/1065  2:38 PM EST -----  Regarding: [facility]    01/29/20 2:39 PM    Hello, our patient Christa Short has had Mammogram completed/performed  Please assist in updating the patient chart by pulling the Care Everywhere (CE) document  The date of service is 01/2020       Thank you,  Moise Bo LPN  [facility]

## 2020-01-29 NOTE — TELEPHONE ENCOUNTER
Upon review of your request/inquiry, this will show on mpm in 14 days  Any additional questions or concerns should be emailed to the Practice Liaisons via [email or web address] email, please do not reply via In Basket       Thank you  Romain De Los Santos MA

## 2020-01-30 ENCOUNTER — HOSPITAL ENCOUNTER (OUTPATIENT)
Dept: RADIOLOGY | Facility: HOSPITAL | Age: 45
Discharge: HOME | End: 2020-01-30
Payer: COMMERCIAL

## 2020-01-30 ENCOUNTER — OFFICE VISIT (OUTPATIENT)
Dept: OBGYN CLINIC | Facility: HOSPITAL | Age: 45
End: 2020-01-30
Payer: COMMERCIAL

## 2020-01-30 VITALS
DIASTOLIC BLOOD PRESSURE: 85 MMHG | HEART RATE: 68 BPM | HEIGHT: 68 IN | BODY MASS INDEX: 42.44 KG/M2 | SYSTOLIC BLOOD PRESSURE: 130 MMHG | WEIGHT: 280 LBS

## 2020-01-30 DIAGNOSIS — M25.531 PAIN IN RIGHT WRIST: Primary | ICD-10-CM

## 2020-01-30 DIAGNOSIS — M25.531 PAIN IN RIGHT WRIST: ICD-10-CM

## 2020-01-30 DIAGNOSIS — M77.8 RIGHT WRIST TENDONITIS: ICD-10-CM

## 2020-01-30 PROCEDURE — 99213 OFFICE O/P EST LOW 20 MIN: CPT | Performed by: PHYSICIAN ASSISTANT

## 2020-01-30 PROCEDURE — 73110 X-RAY EXAM OF WRIST: CPT

## 2020-01-30 RX ORDER — MELOXICAM 15 MG/1
15 TABLET ORAL DAILY
Qty: 30 TABLET | Refills: 0 | Status: SHIPPED | OUTPATIENT
Start: 2020-01-30

## 2020-01-30 NOTE — PROGRESS NOTES
Assessment/Plan   Diagnoses and all orders for this visit:    Pain in right wrist    Right wrist flexor tendonitis  -     meloxicam (MOBIC) 15 mg tablet; Take 1 tablet (15 mg total) by mouth daily  -     Wrist splint fitted and dispensed  -     Ice as needed  -     Start PT/OT  -     Follow up with Dr Chase Zavala in 6 weeks          Subjective   Patient ID: Jimbo Duque is a 40 y o  female  Vitals:    01/30/20 1844   BP: 130/85   Pulse: 76     45yo female comes in for an evaluation of her right wrist   She is a patient of Dr Chase Zavala for right wrist tendinitis  She was doing well and then went bowling on Saturday  As the was rolling the ball forward, she felt a sudden pain in the volar wrist/forearm  She has been having soreness since then  The pain is dull in character, mild in severity, pain does not radiate and is not associated with numbness          The following portions of the patient's history were reviewed and updated as appropriate: allergies, current medications, past family history, past medical history, past social history, past surgical history and problem list     Review of Systems  Ortho Exam  Past Medical History:   Diagnosis Date    Arthritis     Benign essential hypertension     Resolved 8/16/2016     [facility]     Diabetes mellitus type 2, controlled ([facility]     Dyspnea and respiratory abnormality     Resolved 2/9/2015     Migraine     Varicella     Vertigo      Past Surgical History:   Procedure Laterality Date    HAND SURGERY  09/2014    KNEE ARTHROPLASTY Right     WISDOM TOOTH EXTRACTION       Family History   Problem Relation Age of Onset    Diabetes unspecified Mother     Diabetes Mother     Lupus Mother     Diabetes type II Mother     Diabetes unspecified Sister     Diabetes unspecified Paternal Grandfather     Arrhythmia Paternal Grandfather     Heart attack Paternal Grandfather     Heart disease Paternal Grandfather     Diabetes Sister     Cancer Paternal Grandmother     Breast cancer Paternal Grandmother         49-58s    Heart murmur Father     Other Father         Right leg amputation     Cancer Maternal Grandmother         Skin, also maybe breast     No Known Problems Maternal Grandfather      Social History     Occupational History    Not on file   Tobacco Use    Smoking status: Never Smoker    Smokeless tobacco: Never Used   Substance and Sexual Activity    Alcohol use: Yes     Comment: Once in a blue moon    Drug use: No    Sexual activity: Yes     Partners: Male     Birth control/protection: None       Review of Systems   Constitutional: Negative  HENT: Negative  Eyes: Negative  Respiratory: Negative  Cardiovascular: Negative  Gastrointestinal: Negative  Endocrine: Negative  Genitourinary: Negative  Musculoskeletal: As below      Allergic/Immunologic: Negative  Neurological: Negative  Hematological: Negative  Psychiatric/Behavioral: Negative  Objective   Physical Exam    · Constitutional: Awake, Alert, Oriented  · Eyes: EOMI  · Psych: Mood and affect appropriate  · Heart: regular rate and rhythm  · Lungs: No audible wheezing  · Abdomen: soft  · Lymph: no lymphedema   right wrist:  - Appearance   No swelling, discoloration, deformity, or ecchymosis  - Palpation  o No tenderness to palpation of distal radius, distal ulna, scaphoid, lunate, hamate, ulnar wrist, dorsal wrist, palmar wrist, thenar eminence, hypothenar eminence, or hand  (Note:  The pain is in the volar forearm just proximal to the wrist, but it is not tender to the touch)  - ROM  o palmar flexion 60, dorsiflexion 60, pronation 90, supination 90  - Motor  o Limited by pain, but she is able to actively move in all planes    ++ pain with active volarflexion and with passive dorsiflexion   - Special Tests  o normal sensation of hand and arm  - NVI distally    I have personally reviewed pertinent films in PACS and my interpretation is no fracture

## 2020-02-04 ENCOUNTER — TELEPHONE (OUTPATIENT)
Dept: OBGYN CLINIC | Facility: HOSPITAL | Age: 45
End: 2020-02-04

## 2020-02-04 DIAGNOSIS — M77.8 RIGHT WRIST TENDINITIS: Primary | ICD-10-CM

## 2020-02-04 NOTE — TELEPHONE ENCOUNTER
Patient saw Gianna Tony from [facility] PT is calling because patient was referred to PT and OT for her right wrist and there is no orders in her chart Can orders please be placed?  Patient has an appt scheduled for Thursday 2/6/2020    CB:[phone]

## 2020-02-06 ENCOUNTER — EVALUATION (OUTPATIENT)
Dept: OCCUPATIONAL THERAPY | Facility: CLINIC | Age: 45
End: 2020-02-06
Payer: COMMERCIAL

## 2020-02-06 DIAGNOSIS — M77.8 RIGHT WRIST TENDONITIS: Primary | ICD-10-CM

## 2020-02-06 PROCEDURE — 97165 OT EVAL LOW COMPLEX 30 MIN: CPT | Performed by: OCCUPATIONAL THERAPIST

## 2020-02-06 PROCEDURE — 97140 MANUAL THERAPY 1/> REGIONS: CPT | Performed by: OCCUPATIONAL THERAPIST

## 2020-02-06 NOTE — PROGRESS NOTES
OT Evaluation     Today's date: 2020  Patient name: Jeffy Burks  : 1975  MRN: [id number]  Referring provider: Jerry Damon  Dx:   Encounter Diagnosis     ICD-10-CM    1  Right wrist tendonitis M77 8                   Assessment  Assessment details: Efra Rosenbaum is a RHD female that is referred due to right wrist pain that began two weeks ago after bowling  She reports that her pain has improved over the last few days and it is mild at this time  She presents with findings suggestive of mild FCU irritation based on her pain with passive stretching and area of tenderness  See below for a detailed assessment  Impairments: abnormal or restricted ROM, activity intolerance, impaired physical strength, lacks appropriate home exercise program and pain with function    Symptom irritability: lowUnderstanding of Dx/Px/POC: good   Prognosis: good    Goals  STG: Patient will be compliant with home exercise program in 1 week  STG: Pain will be reduced to a 2/10 during appropriate activity and during therapy in 3 weeks  STG: Range of motion of right wrist will be improved by 5 degrees in 3 weeks  STG: Strength will be improved by 10 pounds  strength in 4 weeks  LTG: Performance in ADLs and IADLS will be improved to prior level of function with the affected extremity within 6 weeks or discharge  LTG: Performance in work activity will be improved to prior level of function with the affected extremity within 6 weeks or discharge  LTG: FOTO score increase by 20 points within 6 weeks or discharge  Plan  Plan details: Treatment to include modalities, manual therapy, PRE's, HEP, and orthotics as appropriate     Patient would benefit from: skilled OT and OT eval  Planned modality interventions: thermotherapy: hydrocollator packs  Planned therapy interventions: home exercise program, joint mobilization, manual therapy, therapeutic exercise, patient education, therapeutic activities, stretching and strengthening  Frequency: 2x week  Duration in visits: 10  Plan of Care beginning date: 2020  Plan of Care expiration date: 2020  Treatment plan discussed with: patient        Subjective Evaluation    History of Present Illness  Mechanism of injury: She went bowling on  and noted pain immediately after in her right wrist  She reports that it is improving  Pain  Current pain ratin  At best pain ratin  At worst pain ratin  Location: Volar ulnar wrist    Social Support    Employment status: working ( )  Hand dominance: right    Treatments  Current treatment: occupational therapy  Patient Goals  Patient goals for therapy: decreased pain          Objective     Palpation     Right   Tenderness of the flexor carpi ulnaris  Active Range of Motion     Left Wrist   Wrist flexion: 65 degrees   Wrist extension: 70 degrees   Radial deviation: 20 degrees   Ulnar deviation: 35 degrees     Right Wrist   Wrist flexion: 60 degrees with pain  Wrist extension: 74 degrees   Radial deviation: 20 degrees   Ulnar deviation: 25 degrees     Passive Range of Motion     Right Wrist   Wrist extension: with pain    Strength/Myotome Testing     Left Wrist/Hand      (2nd hand position)     Trial 1: 41 8    Thumb Strength  Key/Lateral Pinch     Trail 1: 14    Right Wrist/Hand      (2nd hand position)     Trial 1: 30 6    Thumb Strength   Key/Lateral Pinch     Trial 1: 10    Comments: 3/10 pain            Tests     Right Wrist/Hand   Negative TFCC load       Additional Tests Details  -RROM FCU  -ECU subluxation              Precautions: Universal      Manual              IASTM FCU 9'            Cupping 3'            KT X                                          Exercise Diary              HEP: Extrinsic stretching, FCU isometrics Issued            Wrist maze             Extension web             Flexion web             [place]             Wall walking Modalities  2/6            [facility] 10'

## 2020-02-13 ENCOUNTER — OFFICE VISIT (OUTPATIENT)
Dept: OCCUPATIONAL THERAPY | Facility: CLINIC | Age: 45
End: 2020-02-13
Payer: COMMERCIAL

## 2020-02-13 DIAGNOSIS — M77.8 RIGHT WRIST TENDONITIS: Primary | ICD-10-CM

## 2020-02-13 PROCEDURE — 97140 MANUAL THERAPY 1/> REGIONS: CPT | Performed by: OCCUPATIONAL THERAPIST

## 2020-02-13 PROCEDURE — 97110 THERAPEUTIC EXERCISES: CPT | Performed by: OCCUPATIONAL THERAPIST

## 2020-02-13 NOTE — PROGRESS NOTES
Daily Note     Today's date: 2020  Patient name: Collins Roman  : 1975  MRN: [id number]  Referring provider: Karishma Sears  Dx:   Encounter Diagnosis     ICD-10-CM    1  Right wrist tendonitis M77 8                   Subjective: She states more pain in the wrist since the last visit  Objective: See treatment diary below  Assessment: Significant FCU tightness and trigger points  Improved slightly after IASTM techniques  Plan: Progress treatment as tolerated         Precautions: Universal      Manual             IASTM FCU 9' 9'           Cupping 3' 3'           KT X X                                         Exercise Diary             HEP: Extrinsic stretching, FCU isometrics Issued            Wrist maze  10x           Extension web  yellow 3x10           Flexion web  Green 3x10           Keypegs  1x           Wall walking   10x                                                                                                                                                                                                     Modalities             [facility] 10' 10

## 2020-02-16 DIAGNOSIS — E11.65 TYPE 2 DIABETES MELLITUS WITH HYPERGLYCEMIA, WITHOUT LONG-TERM CURRENT USE OF INSULIN (HCC): ICD-10-CM

## 2020-02-16 RX ORDER — LANCETS 30 GAUGE
EACH MISCELLANEOUS
Qty: 100 EACH | Refills: 1 | Status: SHIPPED | OUTPATIENT
Start: 2020-02-16 | End: 2020-07-01 | Stop reason: SDUPTHER

## 2020-02-20 ENCOUNTER — APPOINTMENT (OUTPATIENT)
Dept: OCCUPATIONAL THERAPY | Facility: CLINIC | Age: 45
End: 2020-02-20
Payer: COMMERCIAL

## 2020-02-24 ENCOUNTER — OFFICE VISIT (OUTPATIENT)
Dept: OCCUPATIONAL THERAPY | Facility: CLINIC | Age: 45
End: 2020-02-24
Payer: COMMERCIAL

## 2020-02-24 DIAGNOSIS — M77.8 RIGHT WRIST TENDONITIS: Primary | ICD-10-CM

## 2020-02-24 PROCEDURE — 97110 THERAPEUTIC EXERCISES: CPT | Performed by: OCCUPATIONAL THERAPIST

## 2020-02-24 PROCEDURE — 97530 THERAPEUTIC ACTIVITIES: CPT | Performed by: OCCUPATIONAL THERAPIST

## 2020-02-24 PROCEDURE — 97140 MANUAL THERAPY 1/> REGIONS: CPT | Performed by: OCCUPATIONAL THERAPIST

## 2020-02-24 NOTE — PROGRESS NOTES
Daily Note     Today's date: 2020  Patient name: Kristen Vincent  : 1975  MRN: [id number]  Referring provider: Lucía De Jesus  Dx:   Encounter Diagnosis     ICD-10-CM    1  Right wrist tendonitis M77 8                   Subjective: "it hurt yesterday because I was making hash browns"     Objective: See treatment diary below  Assessment: Less ECU tenderness today and tightness demonstrated  She continues to report pain with some activities  Plan: Progress treatment as tolerated         Precautions: Universal      Manual            IASTM FCU 9' 9' 14'          Cupping 3' 3'           KT X X X                                        Exercise Diary            HEP: Extrinsic stretching, FCU isometrics Issued            Wrist maze  10x 10x          Extension web  yellow 3x10 yellow 3x10          Flexion web gripping   Green 3x10 Green 3x10          Keypegs  1x 1x          Wall walking   10x 5x          Flex bar    Red flex/extend 20x          Wrist curls   2# 2x10                                                                                                                                                                          Modalities            [facility] 10' 10 10'

## 2020-02-27 ENCOUNTER — OFFICE VISIT (OUTPATIENT)
Dept: OCCUPATIONAL THERAPY | Facility: CLINIC | Age: 45
End: 2020-02-27
Payer: COMMERCIAL

## 2020-02-27 DIAGNOSIS — M77.8 RIGHT WRIST TENDONITIS: Primary | ICD-10-CM

## 2020-02-27 PROCEDURE — 97140 MANUAL THERAPY 1/> REGIONS: CPT | Performed by: OCCUPATIONAL THERAPIST

## 2020-02-27 PROCEDURE — 97110 THERAPEUTIC EXERCISES: CPT | Performed by: OCCUPATIONAL THERAPIST

## 2020-02-27 NOTE — PROGRESS NOTES
Daily Note     Today's date: 2020  Patient name: Madeline Story  : 1975  MRN: [id number]  Referring provider: Chari Milner  Dx:   Encounter Diagnosis     ICD-10-CM    1  Right wrist tendonitis M77 8                   Subjective: "it has been good actually"    Objective: See treatment diary below  [id number] [facility]  [id number] manual  [id number] TE  [id number] unsupervised       Assessment: Doing well  Held tape today due to slight reaction  Mild upgrades and tolerated well  Plan: Progress treatment as tolerated         Precautions: Universal      Manual           IASTM FCU 9' 9' 14' 7'         Cupping 3' 3'  3'         KT X X X held                                       Exercise Diary           HEP: Extrinsic stretching, FCU isometrics Issued            Wrist maze  10x 10x 10x         Extension web  yellow 3x10 yellow 3x10 yellow 3x10         Flexion web gripping   Green 3x10 Green 3x10 Green 3x10         Keypegs  1x 1x          Wall walking   10x 5x Green ball 5x         Flex bar    Red flex/extend 20x Red flex/extend 20x         Wrist curls   2# 2x10 3# 2x10                                                                                                                                                                         Modalities           [facility] 10' 10 10' 11'

## 2020-03-05 ENCOUNTER — OFFICE VISIT (OUTPATIENT)
Dept: OCCUPATIONAL THERAPY | Facility: CLINIC | Age: 45
End: 2020-03-05
Payer: COMMERCIAL

## 2020-03-05 DIAGNOSIS — M77.8 RIGHT WRIST TENDONITIS: Primary | ICD-10-CM

## 2020-03-05 PROCEDURE — 97140 MANUAL THERAPY 1/> REGIONS: CPT

## 2020-03-05 PROCEDURE — 97530 THERAPEUTIC ACTIVITIES: CPT

## 2020-03-05 NOTE — PROGRESS NOTES
Daily Note     Today's date: 3/5/2020  Patient name: Ronna Wadsworth  : 1975  MRN: [id number]  Referring provider: Uday Stubbs  Dx:   Encounter Diagnosis     ICD-10-CM    1  Right wrist tendonitis M77 8                   Subjective: "it has been good actually"    Objective: See treatment diary below  Assessment: Doing well  Pain throughout session, but able to complete all PREs  Reports that tape feels it helps support wrist and decreases pain during daily activities  Plan: Progress treatment as tolerated         Precautions: Universal      Manual  2/6 2/13 2/24 2/27 3/5        IASTM FCU 9' 9' 14' 7' 7'        Cupping 3' 3'  3'         KT X X X held applied                                      Exercise Diary  2/6 2/13 2/24 2/27 3/5        HEP: Extrinsic stretching, FCU isometrics Issued            Wrist maze  10x 10x 10x 10x        Extension web  yellow 3x10 yellow 3x10 yellow 3x10         Flexion web gripping   Green 3x10 Green 3x10 Green 3x10 Green 3x10        Keypegs  1x 1x  1x        Wall walking   10x 5x Green ball 5x Green ball 5x        Flex bar    Red flex/extend 20x Red flex/extend 20x Red flex/extend 20x        Wrist curls   2# 2x10 3# 2x10 3# 2x10                                                                                                                                                                        Modalities  2/6 2/13 2/24 2/27 3/5        [facility] 10' 10 10' 11' 10'

## 2020-03-07 ENCOUNTER — OFFICE VISIT (OUTPATIENT)
Dept: URGENT CARE | Age: 45
End: 2020-03-07
Payer: COMMERCIAL

## 2020-03-07 ENCOUNTER — APPOINTMENT (OUTPATIENT)
Dept: RADIOLOGY | Age: 45
End: 2020-03-07
Payer: COMMERCIAL

## 2020-03-07 VITALS
BODY MASS INDEX: 44.41 KG/M2 | HEIGHT: 68 IN | OXYGEN SATURATION: 97 % | DIASTOLIC BLOOD PRESSURE: 60 MMHG | WEIGHT: 293 LBS | SYSTOLIC BLOOD PRESSURE: 130 MMHG | RESPIRATION RATE: 16 BRPM | TEMPERATURE: 98 F | HEART RATE: 78 BPM

## 2020-03-07 DIAGNOSIS — M25.531 RIGHT WRIST PAIN: ICD-10-CM

## 2020-03-07 DIAGNOSIS — M25.531 WRIST PAIN, RIGHT: ICD-10-CM

## 2020-03-07 DIAGNOSIS — M25.531 RIGHT WRIST PAIN: Primary | ICD-10-CM

## 2020-03-07 PROCEDURE — G0382 LEV 3 HOSP TYPE B ED VISIT: HCPCS | Performed by: PHYSICIAN ASSISTANT

## 2020-03-07 PROCEDURE — 73110 X-RAY EXAM OF WRIST: CPT

## 2020-03-08 NOTE — PATIENT INSTRUCTIONS
-continue with Ace wrap and wrist brace as directed  -come out of the wrist brace a few times every day for gentle range of motion  -continue with anti-inflammatories and Tylenol as needed for pain  -follow-up with Dr Alma Schwartz as scheduled on Friday

## 2020-03-08 NOTE — PROGRESS NOTES
[facility]        NAME: Mynor Becerra is a 40 y o  female  : 1975    MRN: [id number]  DATE: 2020  TIME: 7:55 PM    Assessment and Plan   Right wrist pain [M25 531]  1  Right wrist pain  XR wrist 3+ vw right   2  Wrist pain, right           Patient Instructions     -continue with Ace wrap and wrist brace as directed  -come out of the wrist brace a few times every day for gentle range of motion  -continue with anti-inflammatories and Tylenol as needed for pain  -follow-up with Dr Chuckie Metzger as scheduled on Friday    Proceed to  ER if symptoms worsen  Chief Complaint     Chief Complaint   Patient presents with    Wrist Pain     c/o right wrist pain          History of Present Illness       Patient is currently be treating being treated for right wrist flexor tendinitis that occurred after bowling in the end of January  She is in therapy for now  She states she is feeling okay but had kidney c/o tape on her wrist when she took it off she felt increased pain in her wrist   She states the pain feels like something steering when she moves it  She has been taking ibuprofen and has tried Mobic without much relief  She denies any numbness or tingling  She has a follow-up appointment with the hand surgeon on Friday  Review of Systems   Review of Systems   Constitutional: Negative  Respiratory: Negative  Cardiovascular: Negative  Gastrointestinal: Negative  Musculoskeletal:        Right wrist pain   Neurological: Negative  Psychiatric/Behavioral: Negative            Current Medications       Current Outpatient Medications:     atorvastatin (LIPITOR) 10 mg tablet, Take 1 tablet (10 mg total) by mouth daily, Disp: 90 tablet, Rfl: 1    Dulaglutide (TRULICITY) 1 5 HN/4 9JN SOPN, Inject 0 5 mL (1 5 mg total) under the skin once a week, Disp: 2 mL, Rfl: 6    glucose blood (ONETOUCH VERIO) test strip, 1 each by Other route 2 (two) times a day Use as instructed, Disp: 200 each, Rfl: 1    Lancets (ONETOUCH DELICA PLUS [id number]) MISC, TEST TWO TIMES A DAY, Disp: 100 each, Rfl: 1    lisinopril (ZESTRIL) 10 mg tablet, Take 1 tablet (10 mg total) by mouth daily For blood pressure, Disp: 90 tablet, Rfl: 1    meclizine (ANTIVERT) 12 5 MG tablet, Take 12 5 mg by mouth Three times daily as needed, Disp: , Rfl:     meloxicam (MOBIC) 15 mg tablet, Take 1 tablet (15 mg total) by mouth daily, Disp: 30 tablet, Rfl: 0    metFORMIN (GLUCOPHAGE) 1000 MG tablet, Take 1 tablet (1,000 mg total) by mouth 2 (two) times a day with meals, Disp: 180 tablet, Rfl: 3    ONETOUCH DELICA LANCETS 44K MISC, Check twice daily, Disp: , Rfl:     Current Allergies     Allergies as of 03/07/2020 - Reviewed 03/07/2020   Allergen Reaction Noted    Hydrocodone-acetaminophen  04/29/2015            The following portions of the patient's history were reviewed and updated as appropriate: allergies, current medications, past family history, past medical history, past social history, past surgical history and problem list      Past Medical History:   Diagnosis Date    Arthritis     Benign essential hypertension     Resolved 8/16/2016     Diabetes mellitus ([address]     Diabetes mellitus type 2, controlled ([address] )     Dyspnea and respiratory abnormality     Resolved 2/9/2015     Migraine     Varicella     Vertigo        Past Surgical History:   Procedure Laterality Date    HAND SURGERY  09/2014    KNEE ARTHROPLASTY Right     WISDOM TOOTH EXTRACTION         Family History   Problem Relation Age of Onset    Diabetes unspecified Mother     Diabetes Mother     Lupus Mother     Diabetes type II Mother     Diabetes unspecified Sister     Diabetes unspecified Paternal Grandfather     Arrhythmia Paternal Grandfather     Heart attack Paternal Grandfather     Heart disease Paternal Grandfather     Diabetes Sister     Cancer Paternal Grandmother     Breast cancer Paternal Grandmother         50-60s    Heart murmur Father     Other Father         Right leg amputation     Cancer Maternal Grandmother         Skin, also maybe breast     No Known Problems Maternal Grandfather          Medications have been verified  Objective   /60   Pulse 78   Temp 98 °F (36 7 °C) (Temporal)   Resp 16   Ht 5' 8" (1 727 m)   Wt 134 kg (296 lb)   SpO2 97%   BMI 45 01 kg/m²        Physical Exam     Physical Exam   Constitutional: She is oriented to person, place, and time  She appears well-developed and well-nourished  No distress  HENT:   Head: Normocephalic and atraumatic  Cardiovascular: Normal rate  Pulmonary/Chest: Effort normal    Musculoskeletal:   Full range of motion of wrist with pain  No tenderness to palpation  Sensation intact  Mild swelling  Full flexion-extension of all digits  Brisk cap refill  Neurological: She is alert and oriented to person, place, and time  Skin: Skin is warm and dry  She is not diaphoretic  Psychiatric: She has a normal mood and affect  Her behavior is normal    Nursing note and vitals reviewed

## 2020-03-09 ENCOUNTER — TELEPHONE (OUTPATIENT)
Dept: OBGYN CLINIC | Facility: HOSPITAL | Age: 45
End: 2020-03-09

## 2020-03-09 NOTE — TELEPHONE ENCOUNTER
Patient is calling because the issue she is seeing Dr Williams Garcia for on Friday has changed & she is feeling a tightening on the inside of her palm side of hand  She states that she feels like it is ripping  Patient just wants us to know ahead of time in case we want her to have an mri before her appt  I did let her know that we couldn't order testing until we physically see her in the office on Friday  Patient has not seen Dr Williams Garcia yet

## 2020-03-13 ENCOUNTER — OFFICE VISIT (OUTPATIENT)
Dept: OBGYN CLINIC | Facility: HOSPITAL | Age: 45
End: 2020-03-13
Payer: COMMERCIAL

## 2020-03-13 ENCOUNTER — OFFICE VISIT (OUTPATIENT)
Dept: OCCUPATIONAL THERAPY | Facility: HOSPITAL | Age: 45
End: 2020-03-13
Attending: ORTHOPAEDIC SURGERY
Payer: COMMERCIAL

## 2020-03-13 VITALS
SYSTOLIC BLOOD PRESSURE: 132 MMHG | DIASTOLIC BLOOD PRESSURE: 81 MMHG | WEIGHT: 292 LBS | HEIGHT: 68 IN | HEART RATE: 87 BPM | BODY MASS INDEX: 44.25 KG/M2

## 2020-03-13 DIAGNOSIS — M77.8 RIGHT WRIST TENDINITIS: ICD-10-CM

## 2020-03-13 DIAGNOSIS — M77.8 RIGHT WRIST TENDINITIS: Primary | ICD-10-CM

## 2020-03-13 PROCEDURE — 99213 OFFICE O/P EST LOW 20 MIN: CPT | Performed by: ORTHOPAEDIC SURGERY

## 2020-03-13 PROCEDURE — 3008F BODY MASS INDEX DOCD: CPT | Performed by: ORTHOPAEDIC SURGERY

## 2020-03-13 PROCEDURE — 1036F TOBACCO NON-USER: CPT | Performed by: ORTHOPAEDIC SURGERY

## 2020-03-13 PROCEDURE — 2022F DILAT RTA XM EVC RTNOPTHY: CPT | Performed by: ORTHOPAEDIC SURGERY

## 2020-03-13 PROCEDURE — 3079F DIAST BP 80-89 MM HG: CPT | Performed by: ORTHOPAEDIC SURGERY

## 2020-03-13 PROCEDURE — 3075F SYST BP GE 130 - 139MM HG: CPT | Performed by: ORTHOPAEDIC SURGERY

## 2020-03-13 PROCEDURE — 3052F HG A1C>EQUAL 8.0%<EQUAL 9.0%: CPT | Performed by: ORTHOPAEDIC SURGERY

## 2020-03-13 PROCEDURE — L3808 WHFO, RIGID W/O JOINTS: HCPCS | Performed by: OCCUPATIONAL THERAPIST

## 2020-03-13 NOTE — PROGRESS NOTES
ASSESSMENT/PLAN:    Assessment:   Right wrist flexor tendinitis     Plan:   The patient was offered a custom forearm based wrist flexor tendon splint with MPs in slight flexion and Ips in extension to wear at night time  She was also offered a steroid injection vs medrol dose pack however due to her being a diabetic this is not recommended  She may discontinue therapy at this time  She has no formal restrictions at this time  Follow Up:  2  month(s)    To Do Next Visit:       General Discussions:     Diabetes: The risks of diabetes were discussed with the patient  These risks include increased risk of infection, delayed wound healing, increased swelling, increased stiffness, and increased scar formation  While these risks are generally increased in all diabetics, keeping one's blood sugar under strict control can help decrease problems after surgery  If blood sugar levels are too high, or hemoglobin A1c levels are too high, surgery may be delayed or canceled  Operative Discussions:       _____________________________________________________  CHIEF COMPLAINT:  Chief Complaint   Patient presents with    Right Wrist - Pain         SUBJECTIVE:  Jeffy Burks is a RHD 40 y o  female who presents with Pain  Moderate  Constant  Aching to the right wrist   This started  6 week(s) ago as Due to a personal injury  Patient states that this all begun after bowling  Patient states that has not been wearing her wrist splint as this was irritating, she then switched to an ACE bandage without relief  She states that the mobic is also not helping with her symptoms and that therapy KT tape provides her with minimal relief  Patient is an  for work  She denies numbness and tingling     Radiation: Yes to the  wrist  Previous Treatments: therapy, bracing and mobic without relief  Associated symptoms: No Complaints    PAST MEDICAL HISTORY:  Past Medical History:   Diagnosis Date    Arthritis     Benign essential hypertension     Resolved 8/16/2016     [facility] [address] [address]     Diabetes mellitus type 2, controlled ([facility]     Dyspnea and respiratory abnormality     Resolved 2/9/2015     Migraine     Varicella     Vertigo        PAST SURGICAL HISTORY:  Past Surgical History:   Procedure Laterality Date    HAND SURGERY  09/2014    KNEE ARTHROPLASTY Right     WISDOM TOOTH EXTRACTION         FAMILY HISTORY:  Family History   Problem Relation Age of Onset    Diabetes unspecified Mother     Diabetes Mother     Lupus Mother     Diabetes type II Mother     Diabetes unspecified Sister     Diabetes unspecified Paternal Grandfather     Arrhythmia Paternal Grandfather     Heart attack Paternal Grandfather     Heart disease Paternal Grandfather     Diabetes Sister     Cancer Paternal Grandmother     Breast cancer Paternal Grandmother         49-58s    Heart murmur Father     Other Father         Right leg amputation     Cancer Maternal Grandmother         Skin, also maybe breast     No Known Problems Maternal Grandfather        SOCIAL HISTORY:  Social History     Tobacco Use    Smoking status: Never Smoker    Smokeless tobacco: Never Used   Substance Use Topics    Alcohol use: Yes     Comment: Once in a blue moon    Drug use: No       MEDICATIONS:    Current Outpatient Medications:     atorvastatin (LIPITOR) 10 mg tablet, Take 1 tablet (10 mg total) by mouth daily, Disp: 90 tablet, Rfl: 1    Dulaglutide (TRULICITY) 1 5 IH/5 0ZV SOPN, Inject 0 5 mL (1 5 mg total) under the skin once a week, Disp: 2 mL, Rfl: 6    glucose blood (ONETOUCH VERIO) test strip, 1 each by Other route 2 (two) times a day Use as instructed, Disp: 200 each, Rfl: 1    Lancets (ONETOUCH DELICA PLUS [id number]) MISC, TEST TWO TIMES A DAY, Disp: 100 each, Rfl: 1    lisinopril (ZESTRIL) 10 mg tablet, Take 1 tablet (10 mg total) by mouth daily For blood pressure, Disp: 90 tablet, Rfl: 1    meloxicam (MOBIC) 15 mg tablet, Take 1 tablet (15 mg total) by mouth daily, Disp: 30 tablet, Rfl: 0    metFORMIN (GLUCOPHAGE) 1000 MG tablet, Take 1 tablet (1,000 mg total) by mouth 2 (two) times a day with meals, Disp: 180 tablet, Rfl: 3    ONETOUCH DELICA LANCETS 08E MISC, Check twice daily, Disp: , Rfl:     meclizine (ANTIVERT) 12 5 MG tablet, Take 12 5 mg by mouth Three times daily as needed, Disp: , Rfl:     ALLERGIES:  Allergies   Allergen Reactions    Hydrocodone-Acetaminophen      Vicodin--Other reaction(s): BREATHING WAS FUNNY  Tingling sensation in mouth       REVIEW OF SYSTEMS:  Pertinent items are noted in HPI      LABS:  HgA1c:   Lab Results   Component Value Date    HGBA1C 8 3 01/03/2020     BMP: No results found for: GLUCOSE, CALCIUM, NA, K, CO2, CL, BUN, CREATININE      _____________________________________________________  PHYSICAL EXAMINATION:  Vital signs: /81   Pulse 87   Ht 5' 8" (1 727 m)   Wt 132 kg (292 lb)   BMI 44 40 kg/m²   General: well developed and well nourished, alert, oriented times 3 and appears comfortable  Psychiatric: Normal  HEENT: Trachea Midline, No torticollis  Cardiovascular: No discernable arrhythmia  Pulmonary: No wheezing or stridor  Skin: No masses, erythema, lacerations, fluctation, ulcerations  Neurovascular: Sensation Intact to the Median, Ulnar, Radial Nerve, Motor Intact to the Median, Ulnar, Radial Nerve and Pulses Intact    MUSCULOSKELETAL EXAMINATION:  RIGHT SIDE:  Wrist:  apb 5/5, minimal fpl tenderness, fpl 5/5, tenderness fdp long, fdp long 5/5, fds index significant pain, fds intact to long ring and small, fcr and fcu intact     _____________________________________________________  STUDIES REVIEWED:  No Studies to review      PROCEDURES PERFORMED:  Procedures  No Procedures performed today   Scribe Attestation    I,:   Diane Jaimes am acting as a scribe while in the presence of the attending physician :        I,:   Pedro Perez MD personally performed the services described in this documentation    as scribed in my presence :

## 2020-03-13 NOTE — PROGRESS NOTES
Orthosis    Diagnosis:   1  Right wrist tendinitis  Ambulatory referral to PT/OT hand therapy     Indication: Motion Blocking    Location: Right  wrist, index finger, long finger, ring finger and small finger  Supplies: Custom Fit Orthotic and Skin coverage   Orthosis type: volar  Wearing Schedule: Night only  Describe Position: wrist neutral , MCP flexed , Ips ext     Precautions: Universal (skin contact/breakdown)    Patient or Caregiver expresses understanding of wearing Schedule and Precautions? Yes  Patient or Caregiver able to don/doff orthotic independently? Yes    Written orders provided to patient?  Yes  Orders Obtained: Written  Orders Obtained from: Dr Tai Numbers    Return for evaluation and treatment No

## 2020-03-25 NOTE — PROGRESS NOTES
Did not return to therapy after this visit  Patient status relating to their referred diagnosis is unknown after this service date  Refer to treatment notes for progress

## 2020-05-03 DIAGNOSIS — I10 MILD ESSENTIAL HYPERTENSION: ICD-10-CM

## 2020-05-04 RX ORDER — LISINOPRIL 10 MG/1
10 TABLET ORAL DAILY
Qty: 90 TABLET | Refills: 1 | Status: SHIPPED | OUTPATIENT
Start: 2020-05-04 | End: 2020-06-29 | Stop reason: SDUPTHER

## 2020-05-12 ENCOUNTER — OFFICE VISIT (OUTPATIENT)
Dept: FAMILY MEDICINE CLINIC | Facility: CLINIC | Age: 45
End: 2020-05-12
Payer: COMMERCIAL

## 2020-05-12 VITALS
HEART RATE: 64 BPM | HEIGHT: 68 IN | DIASTOLIC BLOOD PRESSURE: 84 MMHG | TEMPERATURE: 98.1 F | SYSTOLIC BLOOD PRESSURE: 138 MMHG | WEIGHT: 290.4 LBS | BODY MASS INDEX: 44.01 KG/M2 | RESPIRATION RATE: 18 BRPM | OXYGEN SATURATION: 98 %

## 2020-05-12 DIAGNOSIS — E78.5 HYPERLIPIDEMIA, UNSPECIFIED HYPERLIPIDEMIA TYPE: ICD-10-CM

## 2020-05-12 DIAGNOSIS — E11.65 TYPE 2 DIABETES MELLITUS WITH HYPERGLYCEMIA, WITHOUT LONG-TERM CURRENT USE OF INSULIN (HCC): ICD-10-CM

## 2020-05-12 DIAGNOSIS — E66.01 CLASS 3 SEVERE OBESITY DUE TO EXCESS CALORIES WITH SERIOUS COMORBIDITY AND BODY MASS INDEX (BMI) OF 40.0 TO 44.9 IN ADULT (HCC): ICD-10-CM

## 2020-05-12 DIAGNOSIS — Z11.4 SCREENING FOR HIV (HUMAN IMMUNODEFICIENCY VIRUS): Primary | ICD-10-CM

## 2020-05-12 DIAGNOSIS — I10 MILD ESSENTIAL HYPERTENSION: ICD-10-CM

## 2020-05-12 DIAGNOSIS — R79.89 ELEVATED TSH: ICD-10-CM

## 2020-05-12 DIAGNOSIS — H81.13 BENIGN PAROXYSMAL POSITIONAL VERTIGO DUE TO BILATERAL VESTIBULAR DISORDER: ICD-10-CM

## 2020-05-12 DIAGNOSIS — Z12.31 ENCOUNTER FOR SCREENING MAMMOGRAM FOR BREAST CANCER: ICD-10-CM

## 2020-05-12 DIAGNOSIS — Z12.4 CERVICAL CANCER SCREENING: ICD-10-CM

## 2020-05-12 PROCEDURE — 2022F DILAT RTA XM EVC RTNOPTHY: CPT | Performed by: FAMILY MEDICINE

## 2020-05-12 PROCEDURE — 3008F BODY MASS INDEX DOCD: CPT | Performed by: FAMILY MEDICINE

## 2020-05-12 PROCEDURE — 3075F SYST BP GE 130 - 139MM HG: CPT | Performed by: FAMILY MEDICINE

## 2020-05-12 PROCEDURE — 1036F TOBACCO NON-USER: CPT | Performed by: FAMILY MEDICINE

## 2020-05-12 PROCEDURE — 99214 OFFICE O/P EST MOD 30 MIN: CPT | Performed by: FAMILY MEDICINE

## 2020-05-12 PROCEDURE — 3079F DIAST BP 80-89 MM HG: CPT | Performed by: FAMILY MEDICINE

## 2020-05-12 PROCEDURE — 3052F HG A1C>EQUAL 8.0%<EQUAL 9.0%: CPT | Performed by: FAMILY MEDICINE

## 2020-05-12 RX ORDER — ATORVASTATIN CALCIUM 10 MG/1
10 TABLET, FILM COATED ORAL DAILY
Qty: 90 TABLET | Refills: 1 | Status: SHIPPED | OUTPATIENT
Start: 2020-05-12 | End: 2020-06-29 | Stop reason: SDUPTHER

## 2020-05-12 RX ORDER — MECLIZINE HCL 12.5 MG/1
12.5 TABLET ORAL EVERY 8 HOURS PRN
Qty: 30 TABLET | Refills: 0 | Status: SHIPPED | OUTPATIENT
Start: 2020-05-12 | End: 2021-08-10

## 2020-05-13 ENCOUNTER — TELEPHONE (OUTPATIENT)
Dept: ADMINISTRATIVE | Facility: OTHER | Age: 45
End: 2020-05-13

## 2020-06-29 DIAGNOSIS — I10 MILD ESSENTIAL HYPERTENSION: ICD-10-CM

## 2020-06-29 DIAGNOSIS — E78.5 HYPERLIPIDEMIA, UNSPECIFIED HYPERLIPIDEMIA TYPE: ICD-10-CM

## 2020-06-30 PROCEDURE — 4010F ACE/ARB THERAPY RXD/TAKEN: CPT | Performed by: FAMILY MEDICINE

## 2020-06-30 RX ORDER — ATORVASTATIN CALCIUM 10 MG/1
10 TABLET, FILM COATED ORAL DAILY
Qty: 90 TABLET | Refills: 1 | Status: SHIPPED | OUTPATIENT
Start: 2020-06-30 | End: 2020-11-17 | Stop reason: SDUPTHER

## 2020-06-30 RX ORDER — LISINOPRIL 10 MG/1
10 TABLET ORAL DAILY
Qty: 90 TABLET | Refills: 1 | Status: SHIPPED | OUTPATIENT
Start: 2020-06-30 | End: 2020-11-17 | Stop reason: SDUPTHER

## 2020-07-01 DIAGNOSIS — E11.65 TYPE 2 DIABETES MELLITUS WITH HYPERGLYCEMIA, WITHOUT LONG-TERM CURRENT USE OF INSULIN (HCC): ICD-10-CM

## 2020-07-01 RX ORDER — LANCETS 30 GAUGE
EACH MISCELLANEOUS
Qty: 100 EACH | Refills: 1 | Status: SHIPPED | OUTPATIENT
Start: 2020-07-01 | End: 2020-07-31

## 2020-07-31 DIAGNOSIS — E11.65 TYPE 2 DIABETES MELLITUS WITH HYPERGLYCEMIA, WITHOUT LONG-TERM CURRENT USE OF INSULIN (HCC): ICD-10-CM

## 2020-07-31 RX ORDER — LANCETS 30 GAUGE
EACH MISCELLANEOUS
Qty: 100 EACH | Refills: 0 | Status: SHIPPED | OUTPATIENT
Start: 2020-07-31 | End: 2020-08-20 | Stop reason: CLARIF

## 2020-08-17 DIAGNOSIS — E87.1 HYPONATREMIA: Primary | ICD-10-CM

## 2020-08-17 DIAGNOSIS — E11.65 TYPE 2 DIABETES MELLITUS WITH HYPERGLYCEMIA, WITHOUT LONG-TERM CURRENT USE OF INSULIN (HCC): ICD-10-CM

## 2020-08-17 LAB
ALBUMIN SERPL-MCNC: 3.6 G/DL (ref 3.6–5.1)
ALBUMIN/CREAT UR: 25 MCG/MG CREAT
ALBUMIN/GLOB SERPL: 1.4 (CALC) (ref 1–2.5)
ALP SERPL-CCNC: 70 U/L (ref 31–125)
ALT SERPL-CCNC: 18 U/L (ref 6–29)
AST SERPL-CCNC: 13 U/L (ref 10–35)
BILIRUB SERPL-MCNC: 0.3 MG/DL (ref 0.2–1.2)
BUN SERPL-MCNC: 13 MG/DL (ref 7–25)
BUN/CREAT SERPL: ABNORMAL (CALC) (ref 6–22)
CALCIUM SERPL-MCNC: 8.8 MG/DL (ref 8.6–10.2)
CHLORIDE SERPL-SCNC: 102 MMOL/L (ref 98–110)
CHOLEST SERPL-MCNC: 109 MG/DL
CHOLEST/HDLC SERPL: 2.9 (CALC)
CO2 SERPL-SCNC: 21 MMOL/L (ref 20–32)
CREAT SERPL-MCNC: 0.68 MG/DL (ref 0.5–1.1)
CREAT UR-MCNC: 51 MG/DL (ref 20–275)
GLOBULIN SER CALC-MCNC: 2.6 G/DL (CALC) (ref 1.9–3.7)
GLUCOSE SERPL-MCNC: 247 MG/DL (ref 65–99)
HBA1C MFR BLD: 8.8 % OF TOTAL HGB
HDLC SERPL-MCNC: 38 MG/DL
HIV 1+2 AB+HIV1 P24 AG SERPL QL IA: NORMAL
LDLC SERPL CALC-MCNC: 51 MG/DL (CALC)
MICROALBUMIN UR-MCNC: 1.3 MG/DL
NONHDLC SERPL-MCNC: 71 MG/DL (CALC)
POTASSIUM SERPL-SCNC: 4.8 MMOL/L (ref 3.5–5.3)
PROT SERPL-MCNC: 6.2 G/DL (ref 6.1–8.1)
SL AMB EGFR AFRICAN AMERICAN: 122 ML/MIN/1.73M2
SL AMB EGFR NON AFRICAN AMERICAN: 106 ML/MIN/1.73M2
SODIUM SERPL-SCNC: 132 MMOL/L (ref 135–146)
TRIGL SERPL-MCNC: 114 MG/DL
TSH SERPL-ACNC: 3.55 MIU/L

## 2020-08-17 PROCEDURE — 3052F HG A1C>EQUAL 8.0%<EQUAL 9.0%: CPT | Performed by: FAMILY MEDICINE

## 2020-08-17 PROCEDURE — 3061F NEG MICROALBUMINURIA REV: CPT | Performed by: FAMILY MEDICINE

## 2020-08-17 NOTE — RESULT ENCOUNTER NOTE
Eustace Claude - Dr Maddie Dixon is currently out of the office  She will review your other labs upon her return  Your sodium level was a little low  Please repeat a non-fasting CMP sometime this week to monitor this abnormality  Take care,     Dr Carlos Garces    Message sent to patient via [employer] patient portal   Nurse to also call patient        FYI:  Dr Maddie Dixon

## 2020-08-20 DIAGNOSIS — E11.65 TYPE 2 DIABETES MELLITUS WITH HYPERGLYCEMIA, WITHOUT LONG-TERM CURRENT USE OF INSULIN (HCC): ICD-10-CM

## 2020-08-20 RX ORDER — LANCETS 30 GAUGE
EACH MISCELLANEOUS
Qty: 100 EACH | Refills: 0 | Status: SHIPPED | OUTPATIENT
Start: 2020-08-20 | End: 2020-09-04

## 2020-09-04 DIAGNOSIS — E11.65 TYPE 2 DIABETES MELLITUS WITH HYPERGLYCEMIA, WITHOUT LONG-TERM CURRENT USE OF INSULIN (HCC): ICD-10-CM

## 2020-09-04 RX ORDER — LANCETS 30 GAUGE
EACH MISCELLANEOUS
Qty: 100 EACH | Refills: 0 | Status: SHIPPED | OUTPATIENT
Start: 2020-09-04 | End: 2020-10-07

## 2020-10-05 DIAGNOSIS — E11.65 TYPE 2 DIABETES MELLITUS WITH HYPERGLYCEMIA, WITHOUT LONG-TERM CURRENT USE OF INSULIN (HCC): ICD-10-CM

## 2020-10-07 RX ORDER — LANCETS 30 GAUGE
EACH MISCELLANEOUS
Qty: 100 EACH | Refills: 0 | Status: SHIPPED | OUTPATIENT
Start: 2020-10-07 | End: 2020-12-08

## 2020-11-04 DIAGNOSIS — E78.5 HYPERLIPIDEMIA, UNSPECIFIED HYPERLIPIDEMIA TYPE: ICD-10-CM

## 2020-11-04 DIAGNOSIS — I10 MILD ESSENTIAL HYPERTENSION: ICD-10-CM

## 2020-11-04 RX ORDER — LISINOPRIL 10 MG/1
TABLET ORAL
Qty: 90 TABLET | Refills: 0 | OUTPATIENT
Start: 2020-11-04

## 2020-11-04 RX ORDER — ATORVASTATIN CALCIUM 10 MG/1
TABLET, FILM COATED ORAL
Qty: 90 TABLET | Refills: 0 | OUTPATIENT
Start: 2020-11-04

## 2020-11-17 ENCOUNTER — OFFICE VISIT (OUTPATIENT)
Dept: FAMILY MEDICINE CLINIC | Facility: CLINIC | Age: 45
End: 2020-11-17
Payer: COMMERCIAL

## 2020-11-17 VITALS
OXYGEN SATURATION: 98 % | WEIGHT: 293 LBS | HEIGHT: 68 IN | HEART RATE: 89 BPM | RESPIRATION RATE: 16 BRPM | TEMPERATURE: 98.9 F | BODY MASS INDEX: 44.41 KG/M2 | SYSTOLIC BLOOD PRESSURE: 132 MMHG | DIASTOLIC BLOOD PRESSURE: 78 MMHG

## 2020-11-17 DIAGNOSIS — E11.65 TYPE 2 DIABETES MELLITUS WITH HYPERGLYCEMIA, WITHOUT LONG-TERM CURRENT USE OF INSULIN (HCC): ICD-10-CM

## 2020-11-17 DIAGNOSIS — R79.89 ELEVATED TSH: ICD-10-CM

## 2020-11-17 DIAGNOSIS — E78.5 HYPERLIPIDEMIA, UNSPECIFIED HYPERLIPIDEMIA TYPE: ICD-10-CM

## 2020-11-17 DIAGNOSIS — I10 MILD ESSENTIAL HYPERTENSION: ICD-10-CM

## 2020-11-17 DIAGNOSIS — Z00.00 ANNUAL PHYSICAL EXAM: Primary | ICD-10-CM

## 2020-11-17 PROCEDURE — 4010F ACE/ARB THERAPY RXD/TAKEN: CPT | Performed by: ORTHOPAEDIC SURGERY

## 2020-11-17 PROCEDURE — 3008F BODY MASS INDEX DOCD: CPT | Performed by: ORTHOPAEDIC SURGERY

## 2020-11-17 PROCEDURE — 3075F SYST BP GE 130 - 139MM HG: CPT | Performed by: FAMILY MEDICINE

## 2020-11-17 PROCEDURE — 99396 PREV VISIT EST AGE 40-64: CPT | Performed by: FAMILY MEDICINE

## 2020-11-17 PROCEDURE — 3078F DIAST BP <80 MM HG: CPT | Performed by: FAMILY MEDICINE

## 2020-11-17 RX ORDER — LISINOPRIL 10 MG/1
10 TABLET ORAL DAILY
Qty: 90 TABLET | Refills: 1 | Status: SHIPPED | OUTPATIENT
Start: 2020-11-17 | End: 2021-05-03

## 2020-11-17 RX ORDER — ATORVASTATIN CALCIUM 10 MG/1
10 TABLET, FILM COATED ORAL DAILY
Qty: 90 TABLET | Refills: 1 | Status: SHIPPED | OUTPATIENT
Start: 2020-11-17 | End: 2021-05-03

## 2020-11-17 RX ORDER — NAPROXEN 500 MG/1
1 TABLET ORAL DAILY PRN
COMMUNITY
Start: 2020-10-31

## 2020-11-24 ENCOUNTER — OFFICE VISIT (OUTPATIENT)
Dept: OBGYN CLINIC | Facility: CLINIC | Age: 45
End: 2020-11-24
Payer: COMMERCIAL

## 2020-11-24 DIAGNOSIS — M23.91 INTERNAL DERANGEMENT OF RIGHT KNEE: Primary | ICD-10-CM

## 2020-11-24 PROBLEM — M25.762 OSTEOPHYTE OF LEFT KNEE: Status: RESOLVED | Noted: 2019-05-09 | Resolved: 2020-11-24

## 2020-11-24 PROCEDURE — 99214 OFFICE O/P EST MOD 30 MIN: CPT | Performed by: ORTHOPAEDIC SURGERY

## 2020-11-24 PROCEDURE — 1036F TOBACCO NON-USER: CPT | Performed by: ORTHOPAEDIC SURGERY

## 2020-11-30 ENCOUNTER — HOSPITAL ENCOUNTER (OUTPATIENT)
Dept: MRI IMAGING | Facility: HOSPITAL | Age: 45
Discharge: HOME | End: 2020-11-30
Attending: ORTHOPAEDIC SURGERY
Payer: COMMERCIAL

## 2020-11-30 DIAGNOSIS — M23.91 INTERNAL DERANGEMENT OF RIGHT KNEE: ICD-10-CM

## 2020-11-30 PROCEDURE — 73721 MRI JNT OF LWR EXTRE W/O DYE: CPT

## 2020-11-30 PROCEDURE — G1004 CDSM NDSC: HCPCS

## 2020-12-02 ENCOUNTER — OFFICE VISIT (OUTPATIENT)
Dept: OBGYN CLINIC | Facility: CLINIC | Age: 45
End: 2020-12-02
Payer: COMMERCIAL

## 2020-12-02 ENCOUNTER — APPOINTMENT (OUTPATIENT)
Dept: LAB | Facility: HOSPITAL | Age: 45
End: 2020-12-02
Attending: ORTHOPAEDIC SURGERY
Payer: COMMERCIAL

## 2020-12-02 VITALS
WEIGHT: 293 LBS | HEIGHT: 68 IN | DIASTOLIC BLOOD PRESSURE: 86 MMHG | BODY MASS INDEX: 44.41 KG/M2 | HEART RATE: 88 BPM | SYSTOLIC BLOOD PRESSURE: 142 MMHG

## 2020-12-02 DIAGNOSIS — M70.41 PREPATELLAR BURSITIS OF RIGHT KNEE: Primary | ICD-10-CM

## 2020-12-02 DIAGNOSIS — M70.41 PREPATELLAR BURSITIS OF RIGHT KNEE: ICD-10-CM

## 2020-12-02 PROCEDURE — 1036F TOBACCO NON-USER: CPT | Performed by: ORTHOPAEDIC SURGERY

## 2020-12-02 PROCEDURE — 3077F SYST BP >= 140 MM HG: CPT | Performed by: ORTHOPAEDIC SURGERY

## 2020-12-02 PROCEDURE — 3008F BODY MASS INDEX DOCD: CPT | Performed by: ORTHOPAEDIC SURGERY

## 2020-12-02 PROCEDURE — 87070 CULTURE OTHR SPECIMN AEROBIC: CPT

## 2020-12-02 PROCEDURE — 3079F DIAST BP 80-89 MM HG: CPT | Performed by: ORTHOPAEDIC SURGERY

## 2020-12-02 PROCEDURE — 20610 DRAIN/INJ JOINT/BURSA W/O US: CPT | Performed by: ORTHOPAEDIC SURGERY

## 2020-12-02 PROCEDURE — 87205 SMEAR GRAM STAIN: CPT

## 2020-12-02 PROCEDURE — 99213 OFFICE O/P EST LOW 20 MIN: CPT | Performed by: ORTHOPAEDIC SURGERY

## 2020-12-02 RX ORDER — CEFADROXIL 500 MG/1
500 CAPSULE ORAL EVERY 12 HOURS SCHEDULED
Qty: 14 CAPSULE | Refills: 0 | Status: SHIPPED | OUTPATIENT
Start: 2020-12-02 | End: 2020-12-09

## 2020-12-05 LAB
BACTERIA SPEC BFLD CULT: NO GROWTH
GRAM STN SPEC: NORMAL
GRAM STN SPEC: NORMAL

## 2020-12-08 DIAGNOSIS — E11.65 TYPE 2 DIABETES MELLITUS WITH HYPERGLYCEMIA, WITHOUT LONG-TERM CURRENT USE OF INSULIN (HCC): ICD-10-CM

## 2020-12-08 RX ORDER — LANCETS 30 GAUGE
EACH MISCELLANEOUS
Qty: 100 EACH | Refills: 0 | Status: SHIPPED | OUTPATIENT
Start: 2020-12-08 | End: 2021-01-21

## 2021-01-02 DIAGNOSIS — E11.65 TYPE 2 DIABETES MELLITUS WITH HYPERGLYCEMIA, WITHOUT LONG-TERM CURRENT USE OF INSULIN (HCC): ICD-10-CM

## 2021-01-06 RX ORDER — LANCETS 30 GAUGE
EACH MISCELLANEOUS
Qty: 100 EACH | Refills: 0 | OUTPATIENT
Start: 2021-01-06

## 2021-01-07 ENCOUNTER — TELEPHONE (OUTPATIENT)
Dept: ENDOCRINOLOGY | Facility: CLINIC | Age: 46
End: 2021-01-07

## 2021-01-07 NOTE — TELEPHONE ENCOUNTER
----- Message from Liz Bansal sent at 1/6/2021  3:49 PM EST -----  Regarding: FW: Non-Urgent Medical Question  Contact: [phone]    ----- Message -----  From: Kristen Mention  Sent: 1/6/2021   2:26 PM EST  To: , #  Subject: Non-Urgent Medical Question                      I need to schedule an appointment with you  Please let me know what works  It can't be 1/15/21 or 1/25/21  To get updated prescriptions      Thanks,    Kristen Mention

## 2021-01-07 NOTE — TELEPHONE ENCOUNTER
Last seen 9/25/2019 with no pending appts  Please call to schedule a follow up appt before med can be refilled  Thanks!

## 2021-01-18 LAB
CREAT ?TM UR-SCNC: 102 UMOL/L
EXT MICROALBUMIN URINE RANDOM: 1.3
HBA1C MFR BLD HPLC: 10.2 %
MICROALBUMIN/CREAT UR: 12.7 MG/G{CREAT}

## 2021-01-21 ENCOUNTER — OFFICE VISIT (OUTPATIENT)
Dept: ENDOCRINOLOGY | Facility: CLINIC | Age: 46
End: 2021-01-21
Payer: COMMERCIAL

## 2021-01-21 VITALS
DIASTOLIC BLOOD PRESSURE: 86 MMHG | SYSTOLIC BLOOD PRESSURE: 130 MMHG | HEART RATE: 84 BPM | WEIGHT: 293 LBS | BODY MASS INDEX: 45.65 KG/M2

## 2021-01-21 DIAGNOSIS — E78.5 HYPERLIPIDEMIA, UNSPECIFIED HYPERLIPIDEMIA TYPE: ICD-10-CM

## 2021-01-21 DIAGNOSIS — I10 MILD ESSENTIAL HYPERTENSION: ICD-10-CM

## 2021-01-21 DIAGNOSIS — E11.65 TYPE 2 DIABETES MELLITUS WITH HYPERGLYCEMIA, WITHOUT LONG-TERM CURRENT USE OF INSULIN (HCC): Primary | ICD-10-CM

## 2021-01-21 PROCEDURE — 3079F DIAST BP 80-89 MM HG: CPT | Performed by: NURSE PRACTITIONER

## 2021-01-21 PROCEDURE — 99214 OFFICE O/P EST MOD 30 MIN: CPT | Performed by: NURSE PRACTITIONER

## 2021-01-21 PROCEDURE — 3075F SYST BP GE 130 - 139MM HG: CPT | Performed by: NURSE PRACTITIONER

## 2021-01-21 PROCEDURE — 1036F TOBACCO NON-USER: CPT | Performed by: NURSE PRACTITIONER

## 2021-01-21 RX ORDER — DULAGLUTIDE 3 MG/.5ML
3 INJECTION, SOLUTION SUBCUTANEOUS WEEKLY
Qty: 2 ML | Refills: 6 | Status: SHIPPED | OUTPATIENT
Start: 2021-01-21 | End: 2021-01-25 | Stop reason: SDUPTHER

## 2021-01-21 RX ORDER — BLOOD SUGAR DIAGNOSTIC
1 STRIP MISCELLANEOUS 2 TIMES DAILY
Qty: 60 EACH | Refills: 6 | Status: SHIPPED | OUTPATIENT
Start: 2021-01-21 | End: 2021-01-26 | Stop reason: SDUPTHER

## 2021-01-21 RX ORDER — BLOOD-GLUCOSE METER
EACH MISCELLANEOUS 2 TIMES DAILY
Qty: 1 KIT | Refills: 0 | Status: SHIPPED | OUTPATIENT
Start: 2021-01-21

## 2021-01-21 NOTE — PROGRESS NOTES
Established Patient Progress Note      Chief Complaint   Patient presents with    Diabetes Type 2          History of Present Illness:   Vijay Farris is a 39 y o  female with a history of HTN, HLD, and type 2 diabetes without long term use of insulin  Reports no complications of diabetes  Last A1C 10 2  Patient has not been seen since September 2019  She did not bring in BG log or meter to today's visit  Has been under a lot of stress and was eating poorly/not exercising  Is now finished her masters and motivated to get her BG back under control  She does not want to start insulin  Was unable to tolerate Jardiance  Denies recent illness or hospitalizations  Denies recent severe hypoglycemic or severe hyperglycemic episodes  Denies any issues with her current regimen  Home glucose monitoring: are performed sporadically    Home blood glucose readings:   Before breakfast: 150-200s  Before lunch: does not check   Before dinner: 100-150s  Bedtime: does not check     Current regimen: Metformin 4,560 mg BID and Trulicity 1 5 mg   compliant all of the timedenies any side effects from current medications     Hypoglycemic episodes: No never   H/o of hypoglycemia causing hospitalization or Intervention such as glucagon injection or ambulance call No     Last Eye Exam: [address] Exam: 11/17/20    Has hypertension: Taking Lisinopril   Has hyperlipidemia: Taking Atorvastatin            Patient Active Problem List   Diagnosis    Type 2 diabetes mellitus with hyperglycemia, without long-term current use of insulin (HCC)    Hyperlipidemia    Mild essential hypertension    Elevated TSH    Right wrist tendonitis    Class 3 severe obesity due to excess calories with serious comorbidity and body mass index (BMI) of 40 0 to 44 9 in [facility])    Primary osteoarthritis of left knee    Migraines      Past Medical History:   Diagnosis Date    Arthritis     Benign essential hypertension     Resolved 8/16/2016     [facility] [address] [facility]     Diabetes mellitus type 2, controlled ([facility] [address] )     Dyspnea and respiratory abnormality     Resolved 2/9/2015     Headache(784 0)     Migraine     Varicella     Vertigo       Past Surgical History:   Procedure Laterality Date    HAND SURGERY  09/2014    KNEE ARTHROPLASTY Right     WISDOM TOOTH EXTRACTION        Family History   Problem Relation Age of Onset    Diabetes unspecified Mother     Diabetes Mother     Lupus Mother     Diabetes type II Mother     Diabetes unspecified Sister     Diabetes unspecified Paternal Grandfather     Arrhythmia Paternal Grandfather     Heart attack Paternal Grandfather     Heart disease Paternal Grandfather     Diabetes Sister     Cancer Paternal Grandmother     Breast cancer Paternal Grandmother         49-58s    Heart murmur Father     Other Father         Right leg amputation     Cancer Maternal Grandmother         Skin, also maybe breast     No Known Problems Maternal Grandfather      Social History     Tobacco Use    Smoking status: Never Smoker    Smokeless tobacco: Never Used   Substance Use Topics    Alcohol use: Yes     Frequency: Monthly or less     Drinks per session: 1 or 2     Binge frequency: Never     Comment: Once in a blue moon     Allergies   Allergen Reactions    Hydrocodone-Acetaminophen      Vicodin--Other reaction(s): BREATHING WAS FUNNY  Tingling sensation in mouth         Current Outpatient Medications:     atorvastatin (LIPITOR) 10 mg tablet, Take 1 tablet (10 mg total) by mouth daily, Disp: 90 tablet, Rfl: 1    lisinopril (ZESTRIL) 10 mg tablet, Take 1 tablet (10 mg total) by mouth daily For blood pressure, Disp: 90 tablet, Rfl: 1    meclizine (ANTIVERT) 12 5 MG tablet, Take 1 tablet (12 5 mg total) by mouth every 8 (eight) hours as needed for dizziness, Disp: 30 tablet, Rfl: 0    meloxicam (MOBIC) 15 mg tablet, Take 1 tablet (15 mg total) by mouth daily, Disp: 30 tablet, Rfl: 0    metFORMIN (GLUCOPHAGE) 1000 MG tablet, Take 1 tablet (1,000 mg total) by mouth 2 (two) times a day with meals, Disp: 180 tablet, Rfl: 3    naproxen (NAPROSYN) 500 mg tablet, Take 1 tablet by mouth daily, Disp: , Rfl:     Calixto Microlet Lancets lancets, Use 2 (two) times a day Use as instructed, Disp: 60 each, Rfl: 6    Blood Glucose Monitoring Suppl (Contour Next One) KIT, Use 2 (two) times a day, Disp: 1 kit, Rfl: 0    Dulaglutide (Trulicity) 3 VE/4 7TP SOPN, Inject 0 5 mL (3 mg total) under the skin once a week, Disp: 2 mL, Rfl: 6    glucose blood (Contour Next Test) test strip, Use 1 each 2 (two) times a day Use as instructed, Disp: 60 each, Rfl: 6    Review of Systems   Constitutional: Negative for chills and fever  HENT: Negative for sore throat and trouble swallowing  Eyes: Negative for visual disturbance  Respiratory: Negative for shortness of breath  Cardiovascular: Negative for chest pain and palpitations  Gastrointestinal: Negative for abdominal pain, constipation and diarrhea  Endocrine: Negative for cold intolerance, heat intolerance, polydipsia, polyphagia and polyuria  Genitourinary: Negative for frequency  Musculoskeletal: Negative for arthralgias and myalgias  Skin: Negative for rash  Neurological: Negative for dizziness and tremors  Hematological: Negative for adenopathy  Psychiatric/Behavioral: Negative for sleep disturbance  All other systems reviewed and are negative  Physical Exam:  Body mass index is 45 65 kg/m²  /86   Pulse 84   Wt (!) 136 kg (300 lb 3 2 oz)   BMI 45 65 kg/m²    Wt Readings from Last 3 Encounters:   01/21/21 (!) 136 kg (300 lb 3 2 oz)   12/02/20 134 kg (295 lb)   11/17/20 134 kg (295 lb 3 2 oz)       Physical Exam  Vitals signs reviewed  Constitutional:       General: She is not in acute distress  Appearance: She is well-developed  HENT:      Head: Normocephalic and atraumatic     Eyes:      Conjunctiva/sclera: Conjunctivae normal  Pupils: Pupils are equal, round, and reactive to light  Neck:      Musculoskeletal: Normal range of motion and neck supple  Thyroid: No thyromegaly  Cardiovascular:      Rate and Rhythm: Normal rate and regular rhythm  Heart sounds: Normal heart sounds  Pulmonary:      Effort: Pulmonary effort is normal  No respiratory distress  Breath sounds: Normal breath sounds  No wheezing or rales  Abdominal:      General: Bowel sounds are normal  There is no distension  Palpations: Abdomen is soft  Tenderness: There is no abdominal tenderness  Musculoskeletal: Normal range of motion  Skin:     General: Skin is warm and dry  Neurological:      Mental Status: She is alert and oriented to person, place, and time  Labs:     Lab Results   Component Value Date    HGBA1C 10 2 (H) 01/18/2021       Lab Results   Component Value Date    SODIUM 132 (L) 08/15/2020    K 4 8 08/15/2020     08/15/2020    CO2 21 08/15/2020    BUN 13 08/15/2020    CREATININE 0 68 08/15/2020    GLUC 247 (H) 08/15/2020    CALCIUM 8 8 08/15/2020    AST 13 08/15/2020    ALT 18 08/15/2020    ALKPHOS 70 08/15/2020    TP 6 2 08/15/2020    TBILI 0 3 08/15/2020         Lab Results   Component Value Date    HDL 38 (L) 08/15/2020    TRIG 114 08/15/2020         Impression & Plan:    Problem List Items Addressed This Visit        Endocrine    Type 2 diabetes mellitus with hyperglycemia, without long-term current use of insulin ([address] ) - Primary     Uncontrolled/poorly controlled with recent A1C of 10 2  Patient declines starting insulin at this time  Is highly motivated to improve her diet/exercise  Discussed increasing dose of Trulicity, she is open to this  Start Trulicity 3 mg weekly  Instructed to check BG at least 2x per day at alternating times of day and to send in BG log in two weeks for review            Relevant Medications    Blood Glucose Monitoring Suppl (Contour Next One) KIT    glucose blood (Contour Next Test) test strip    Calixto Microlet Lancets lancets    Dulaglutide (Trulicity) 3 CI/1 0YB SOPN    Other Relevant Orders    Hemoglobin A1C    Comprehensive metabolic panel    Lipid Panel with Direct LDL reflex    Microalbumin / creatinine urine ratio       Cardiovascular and Mediastinum    Mild essential hypertension     BP stable, continue current regimen          Relevant Orders    Comprehensive metabolic panel       Other    Hyperlipidemia     Stable, continue statin          Relevant Orders    Lipid Panel with Direct LDL reflex          Orders Placed This Encounter   Procedures    Hemoglobin A1C     Standing Status:   Future     Standing Expiration Date:   1/21/2022    Comprehensive metabolic panel     This is a patient instruction: Patient fasting for 8 hours or longer recommended  Standing Status:   Future     Standing Expiration Date:   1/21/2022    Lipid Panel with Direct LDL reflex     This is a patient instruction: This test requires patient fasting for 10-12 hours or longer  Drinking of black coffee or black tea is acceptable  Standing Status:   Future     Standing Expiration Date:   1/21/2022    Microalbumin / creatinine urine ratio     Standing Status:   Future     Standing Expiration Date:   1/21/2022         Discussed with the patient and all questioned fully answered  She will call me if any problems arise  Follow-up appointment in 3 months       Counseled patient on diagnostic results, prognosis, risk and benefit of treatment options, instruction for management, importance of treatment compliance, Risk  factor reduction and impressions      Fabio Loera [address] [facility]

## 2021-01-21 NOTE — ASSESSMENT & PLAN NOTE
Uncontrolled/poorly controlled with recent A1C of 10 2  Patient declines starting insulin at this time  Is highly motivated to improve her diet/exercise  Discussed increasing dose of Trulicity, she is open to this  Start Trulicity 3 mg weekly  Instructed to check BG at least 2x per day at alternating times of day and to send in BG log in two weeks for review

## 2021-01-25 ENCOUNTER — PATIENT MESSAGE (OUTPATIENT)
Dept: ENDOCRINOLOGY | Facility: CLINIC | Age: 46
End: 2021-01-25

## 2021-01-25 DIAGNOSIS — E11.65 TYPE 2 DIABETES MELLITUS WITH HYPERGLYCEMIA, WITHOUT LONG-TERM CURRENT USE OF INSULIN (HCC): ICD-10-CM

## 2021-01-25 RX ORDER — DULAGLUTIDE 3 MG/.5ML
3 INJECTION, SOLUTION SUBCUTANEOUS WEEKLY
Qty: 2 ML | Refills: 5 | Status: SHIPPED | OUTPATIENT
Start: 2021-01-25 | End: 2021-08-25 | Stop reason: SDUPTHER

## 2021-01-25 NOTE — TELEPHONE ENCOUNTER
----- Message from Luciana Mane sent at 1/25/2021  8:04 AM EST -----  Regarding: Prescription Question  Contact: [phone]  The Trulicity was to go to [facility]  You sent it to [facility] instead  I will  the first new dose of Trulicity at [facility]  Can you send another prescription to [facility] to be filled in 1 month?   Thanks,    Luciana Mane

## 2021-01-26 RX ORDER — BLOOD SUGAR DIAGNOSTIC
1 STRIP MISCELLANEOUS 2 TIMES DAILY
Qty: 60 EACH | Refills: 6 | Status: SHIPPED | OUTPATIENT
Start: 2021-01-26 | End: 2021-08-13 | Stop reason: SDUPTHER

## 2021-02-17 ENCOUNTER — TELEPHONE (OUTPATIENT)
Dept: ENDOCRINOLOGY | Facility: CLINIC | Age: 46
End: 2021-02-17

## 2021-02-17 NOTE — TELEPHONE ENCOUNTER
[facility] calling micro lancets authorized for [employer] of 60   They could only dispense 100  Fyi    If any problems  Please call [phone]  Thank you

## 2021-02-25 ENCOUNTER — PATIENT MESSAGE (OUTPATIENT)
Dept: FAMILY MEDICINE CLINIC | Facility: CLINIC | Age: 46
End: 2021-02-25

## 2021-05-02 DIAGNOSIS — E11.65 TYPE 2 DIABETES MELLITUS WITH HYPERGLYCEMIA, WITHOUT LONG-TERM CURRENT USE OF INSULIN (HCC): ICD-10-CM

## 2021-05-02 DIAGNOSIS — E78.5 HYPERLIPIDEMIA, UNSPECIFIED HYPERLIPIDEMIA TYPE: ICD-10-CM

## 2021-05-02 DIAGNOSIS — I10 MILD ESSENTIAL HYPERTENSION: ICD-10-CM

## 2021-05-03 RX ORDER — ATORVASTATIN CALCIUM 10 MG/1
TABLET, FILM COATED ORAL
Qty: 90 TABLET | Refills: 0 | Status: SHIPPED | OUTPATIENT
Start: 2021-05-03 | End: 2021-08-10 | Stop reason: SDUPTHER

## 2021-05-03 RX ORDER — LISINOPRIL 10 MG/1
TABLET ORAL
Qty: 90 TABLET | Refills: 0 | Status: SHIPPED | OUTPATIENT
Start: 2021-05-03 | End: 2021-08-10 | Stop reason: SDUPTHER

## 2021-05-10 DIAGNOSIS — D64.9 ANEMIA, UNSPECIFIED TYPE: Primary | ICD-10-CM

## 2021-05-17 ENCOUNTER — OFFICE VISIT (OUTPATIENT)
Dept: FAMILY MEDICINE CLINIC | Facility: CLINIC | Age: 46
End: 2021-05-17
Payer: COMMERCIAL

## 2021-05-17 VITALS
WEIGHT: 291.4 LBS | SYSTOLIC BLOOD PRESSURE: 132 MMHG | TEMPERATURE: 97.2 F | BODY MASS INDEX: 44.16 KG/M2 | HEIGHT: 68 IN | OXYGEN SATURATION: 99 % | DIASTOLIC BLOOD PRESSURE: 80 MMHG | RESPIRATION RATE: 14 BRPM | HEART RATE: 90 BPM

## 2021-05-17 DIAGNOSIS — E11.65 TYPE 2 DIABETES MELLITUS WITH HYPERGLYCEMIA, WITHOUT LONG-TERM CURRENT USE OF INSULIN (HCC): Primary | ICD-10-CM

## 2021-05-17 DIAGNOSIS — E66.01 CLASS 3 SEVERE OBESITY DUE TO EXCESS CALORIES WITH SERIOUS COMORBIDITY AND BODY MASS INDEX (BMI) OF 40.0 TO 44.9 IN ADULT (HCC): ICD-10-CM

## 2021-05-17 DIAGNOSIS — E78.5 HYPERLIPIDEMIA, UNSPECIFIED HYPERLIPIDEMIA TYPE: ICD-10-CM

## 2021-05-17 DIAGNOSIS — I10 MILD ESSENTIAL HYPERTENSION: ICD-10-CM

## 2021-05-17 PROCEDURE — 99214 OFFICE O/P EST MOD 30 MIN: CPT | Performed by: FAMILY MEDICINE

## 2021-05-17 PROCEDURE — 3725F SCREEN DEPRESSION PERFORMED: CPT | Performed by: FAMILY MEDICINE

## 2021-05-17 NOTE — ASSESSMENT & PLAN NOTE
Has lab slip  We discussed lower carb diet, drinking more water, getting walks in at lunchtime  Lab Results   Component Value Date    HGBA1C 10 2 (H) 01/18/2021

## 2021-05-17 NOTE — PROGRESS NOTES
Assessment/Plan:       Problem List Items Addressed This Visit        Endocrine    Type 2 diabetes mellitus with hyperglycemia, without long-term current use of insulin ([address] ) - Primary     Has lab slip  We discussed lower carb diet, drinking more water, getting walks in at lunchtime  Lab Results   Component Value Date    HGBA1C 10 2 (H) 01/18/2021               Cardiovascular and Mediastinum    Mild essential hypertension     Well controlled            Other    Hyperlipidemia     On lipitor  To get labs this week         Class 3 severe obesity due to excess calories with serious comorbidity and body mass index (BMI) of 40 0 to 44 9 in [facility])     Discussed diet, exercise  Subjective:     Berta Hicks is a 55 y o  female here today and has the below chronic conditions:    Patient Active Problem List   Diagnosis    Type 2 diabetes mellitus with hyperglycemia, without long-term current use of insulin ([address] )    Hyperlipidemia    Mild essential hypertension    Elevated TSH    Right wrist tendonitis    Class 3 severe obesity due to excess calories with serious comorbidity and body mass index (BMI) of 40 0 to 44 9 in [facility])    Primary osteoarthritis of left knee    Migraines     Current Outpatient Medications   Medication Sig Dispense Refill    atorvastatin (LIPITOR) 10 mg tablet Take 1 tablet by mouth daily  90 tablet 0    Calixto Microlet Lancets lancets Use 2 (two) times a day Use as instructed 60 each 6    Blood Glucose Monitoring Suppl (Contour Next One) KIT Use 2 (two) times a day 1 kit 0    Dulaglutide (Trulicity) 3 PY/7 5IU SOPN Inject 0 5 mL (3 mg total) under the skin once a week 2 mL 5    glucose blood (Contour Next Test) test strip Use 1 each 2 (two) times a day Use as instructed 60 each 6    lisinopril (ZESTRIL) 10 mg tablet Take 1 tablet by mouth daily for blood pressure   90 tablet 0    meclizine (ANTIVERT) 12 5 MG tablet Take 1 tablet (12 5 mg total) by mouth every 8 (eight) hours as needed for dizziness 30 tablet 0    meloxicam (MOBIC) 15 mg tablet Take 1 tablet (15 mg total) by mouth daily 30 tablet 0    metFORMIN (GLUCOPHAGE) 1000 MG tablet Take 1 tablet by mouth twice daily with meals  180 tablet 2    naproxen (NAPROSYN) 500 mg tablet Take 1 tablet by mouth daily       No current facility-administered medications for this visit  HPI:  Chief Complaint   Patient presents with    Follow-up     6mo f/u      - CC above per clinical staff and reviewed  Pt here for fu  She is trying to lose weight  Doesn't want to pursue bariatrics  Started taking apple cider vinegar capsules  Stopped it over the weekend b/c of upset stomach- only had taken a few days worth  She hasn't gotten labs done yet  Is going to go this week  type 2 diabetes- mornings 150-170, then varies during the daytime  Trying to eat less carbs    No cp or sob  Taking meds regularly  The following portions of the patient's history were reviewed and updated as appropriate: allergies, current medications, past family history, past medical history, past social history, past surgical history and problem list     ROS:  Review of Systems   No fever, chills, congestion, chest pain, shortness of breath, nausea, vomiting, diarrhea, constipation, blood in stool, mood changes  Rest of ROS neg except as above  Objective:      /80   Pulse 90   Temp (!) 97 2 °F (36 2 °C)   Resp 14   Ht 5' 7 52" (1 715 m)   Wt 132 kg (291 lb 6 4 oz)   SpO2 99%   BMI 44 94 kg/m²   BP Readings from Last 3 Encounters:   05/17/21 132/80   01/21/21 130/86   12/02/20 142/86     Wt Readings from Last 3 Encounters:   05/17/21 132 kg (291 lb 6 4 oz)   01/21/21 (!) 136 kg (300 lb 3 2 oz)   12/02/20 134 kg (295 lb)               Physical Exam:   Physical Exam  Vitals signs and nursing note reviewed  Constitutional:       Appearance: Normal appearance  She is well-developed  She is obese  She is not ill-appearing  HENT:      Head: Normocephalic and atraumatic  Eyes:      Conjunctiva/sclera: Conjunctivae normal    Neck:      Musculoskeletal: Neck supple  Cardiovascular:      Rate and Rhythm: Normal rate and regular rhythm  Heart sounds: Normal heart sounds  No murmur  Pulmonary:      Effort: Pulmonary effort is normal  No respiratory distress  Breath sounds: Normal breath sounds  No wheezing  Abdominal:      Palpations: Abdomen is soft  Tenderness: There is no abdominal tenderness  There is no guarding or rebound  Musculoskeletal:      Right lower leg: No edema  Left lower leg: No edema  Lymphadenopathy:      Cervical: No cervical adenopathy  Skin:     General: Skin is warm and dry  Neurological:      Mental Status: She is alert and oriented to person, place, and time  Psychiatric:         Mood and Affect: Mood normal          Behavior: Behavior normal            BMI Counseling: Body mass index is 44 94 kg/m²  The BMI is above normal  Nutrition recommendations include encouraging healthy choices of fruits and vegetables and moderation in carbohydrate intake  Exercise recommendations include exercising 3-5 times per week

## 2021-05-18 ENCOUNTER — TELEPHONE (OUTPATIENT)
Dept: ADMINISTRATIVE | Facility: OTHER | Age: 46
End: 2021-05-18

## 2021-05-18 NOTE — TELEPHONE ENCOUNTER
----- Message from Tomasa Laughlin LPN sent at 7/75/7667  6:31 PM EDT -----  Regarding: Care Gap Request  08/20/20 3:25 PM    Hello, our patient attached above has had Diabetic Eye Exam completed/performed  Please assist in updating the patient chart by making an External outreach to [facility] located in [address] [place] [address] by [phone]    The date of service is 2021      Thank you,  Tomasa Laughlin LPN  [facility]  [facility]

## 2021-05-18 NOTE — TELEPHONE ENCOUNTER
Upon review of the In Basket request we were able to identify that the patient had the requested item(s) completed internally  Internal labs/procedures/tests are not able to be linked to HM  HM will show as updated but a hyperlink to the document is not possible at this time  The item(s) requested can be found within the Chart Review tabs  and have found that we are unable to obtain a copy of the exclusion documentation requested because Not enough information/incorrect information    Any additional questions or concerns should be emailed to the Practice Liaisons via [email or web address] email, please do not reply via In Basket      Thank you  Markie Chaney MA

## 2021-05-23 LAB
BASOPHILS # BLD AUTO: 82 CELLS/UL (ref 0–200)
BASOPHILS NFR BLD AUTO: 0.8 %
EOSINOPHIL # BLD AUTO: 144 CELLS/UL (ref 15–500)
EOSINOPHIL NFR BLD AUTO: 1.4 %
ERYTHROCYTE [DISTWIDTH] IN BLOOD BY AUTOMATED COUNT: 14.4 % (ref 11–15)
FERRITIN SERPL-MCNC: 43 NG/ML (ref 16–232)
HCT VFR BLD AUTO: 40 % (ref 35–45)
HGB BLD-MCNC: 13 G/DL (ref 11.7–15.5)
LYMPHOCYTES # BLD AUTO: 3893 CELLS/UL (ref 850–3900)
LYMPHOCYTES NFR BLD AUTO: 37.8 %
MCH RBC QN AUTO: 27.7 PG (ref 27–33)
MCHC RBC AUTO-ENTMCNC: 32.5 G/DL (ref 32–36)
MCV RBC AUTO: 85.1 FL (ref 80–100)
MONOCYTES # BLD AUTO: 464 CELLS/UL (ref 200–950)
MONOCYTES NFR BLD AUTO: 4.5 %
NEUTROPHILS # BLD AUTO: 5717 CELLS/UL (ref 1500–7800)
NEUTROPHILS NFR BLD AUTO: 55.5 %
PLATELET # BLD AUTO: 285 THOUSAND/UL (ref 140–400)
PMV BLD REES-ECKER: 12.4 FL (ref 7.5–12.5)
RBC # BLD AUTO: 4.7 MILLION/UL (ref 3.8–5.1)
WBC # BLD AUTO: 10.3 THOUSAND/UL (ref 3.8–10.8)

## 2021-05-24 LAB
ALBUMIN SERPL-MCNC: 3.8 G/DL (ref 3.6–5.1)
ALBUMIN/CREAT UR: 18 MCG/MG CREAT
ALBUMIN/GLOB SERPL: 1.5 (CALC) (ref 1–2.5)
ALP SERPL-CCNC: 71 U/L (ref 31–125)
ALT SERPL-CCNC: 23 U/L (ref 6–29)
AST SERPL-CCNC: 16 U/L (ref 10–35)
BILIRUB SERPL-MCNC: 0.6 MG/DL (ref 0.2–1.2)
BUN SERPL-MCNC: 11 MG/DL (ref 7–25)
BUN/CREAT SERPL: ABNORMAL (CALC) (ref 6–22)
CALCIUM SERPL-MCNC: 9 MG/DL (ref 8.6–10.2)
CHLORIDE SERPL-SCNC: 100 MMOL/L (ref 98–110)
CHOLEST SERPL-MCNC: 113 MG/DL
CHOLEST/HDLC SERPL: 3.1 (CALC)
CO2 SERPL-SCNC: 22 MMOL/L (ref 20–32)
CREAT SERPL-MCNC: 0.69 MG/DL (ref 0.5–1.1)
CREAT UR-MCNC: 76 MG/DL (ref 20–275)
GLOBULIN SER CALC-MCNC: 2.5 G/DL (CALC) (ref 1.9–3.7)
GLUCOSE SERPL-MCNC: 276 MG/DL (ref 65–99)
HBA1C MFR BLD: 9.3 % OF TOTAL HGB
HDLC SERPL-MCNC: 36 MG/DL
LDLC SERPL CALC-MCNC: 56 MG/DL (CALC)
MICROALBUMIN UR-MCNC: 1.4 MG/DL
NONHDLC SERPL-MCNC: 77 MG/DL (CALC)
POTASSIUM SERPL-SCNC: 4.6 MMOL/L (ref 3.5–5.3)
PROT SERPL-MCNC: 6.3 G/DL (ref 6.1–8.1)
SL AMB EGFR AFRICAN AMERICAN: 121 ML/MIN/1.73M2
SL AMB EGFR NON AFRICAN AMERICAN: 104 ML/MIN/1.73M2
SODIUM SERPL-SCNC: 133 MMOL/L (ref 135–146)
TRIGL SERPL-MCNC: 130 MG/DL

## 2021-05-24 PROCEDURE — 3061F NEG MICROALBUMINURIA REV: CPT | Performed by: FAMILY MEDICINE

## 2021-05-24 PROCEDURE — 3046F HEMOGLOBIN A1C LEVEL >9.0%: CPT | Performed by: FAMILY MEDICINE

## 2021-05-27 ENCOUNTER — TELEPHONE (OUTPATIENT)
Dept: ENDOCRINOLOGY | Facility: CLINIC | Age: 46
End: 2021-05-27

## 2021-05-27 NOTE — TELEPHONE ENCOUNTER
----- Message from Rodger Garcia sent at 5/27/2021 12:17 PM EDT -----  Slight improvement in A1C but remains above goal with morning fasting hyperglycemia   Please send in BG log for review

## 2021-06-06 ENCOUNTER — TELEPHONE (OUTPATIENT)
Dept: OTHER | Facility: OTHER | Age: 46
End: 2021-06-06

## 2021-06-06 NOTE — TELEPHONE ENCOUNTER
Pt called to schedule an appointment, " I would like to schedule an appointment as soon as possible becauseI re injured my right foot "

## 2021-06-08 ENCOUNTER — OFFICE VISIT (OUTPATIENT)
Dept: OBGYN CLINIC | Facility: CLINIC | Age: 46
End: 2021-06-08
Payer: COMMERCIAL

## 2021-06-08 ENCOUNTER — APPOINTMENT (OUTPATIENT)
Dept: RADIOLOGY | Facility: AMBULARY SURGERY CENTER | Age: 46
End: 2021-06-08
Attending: ORTHOPAEDIC SURGERY
Payer: COMMERCIAL

## 2021-06-08 VITALS
HEART RATE: 96 BPM | DIASTOLIC BLOOD PRESSURE: 93 MMHG | WEIGHT: 292.4 LBS | HEIGHT: 68 IN | BODY MASS INDEX: 44.31 KG/M2 | RESPIRATION RATE: 20 BRPM | SYSTOLIC BLOOD PRESSURE: 131 MMHG

## 2021-06-08 DIAGNOSIS — M79.671 PAIN IN RIGHT FOOT: ICD-10-CM

## 2021-06-08 DIAGNOSIS — M76.61 ACHILLES TENDINITIS, RIGHT LEG: Primary | ICD-10-CM

## 2021-06-08 PROCEDURE — 73630 X-RAY EXAM OF FOOT: CPT

## 2021-06-08 PROCEDURE — 99243 OFF/OP CNSLTJ NEW/EST LOW 30: CPT | Performed by: ORTHOPAEDIC SURGERY

## 2021-06-08 NOTE — PROGRESS NOTES
LIZY Kulkarni  Attending, Orthopaedic Surgery  Foot and [address] [facility] [address] [facility]      ORTHOPAEDIC FOOT AND ANKLE CLINIC VISIT     Assessment:     Encounter Diagnoses   Name Primary?  Pain in right foot     Achilles tendinitis, right leg Yes            Plan:   · The patient verbalized understanding of exam findings and treatment plan  We engaged in the shared decision-making process and treatment options were discussed at length with the patient  Surgical and conservative management discussed today along with risks and benefits  · Letha Russell has right insertional achilles tendinitis, which started 4 days ago  · Recommend supportive shoe wear and heel lifts, which were provided in clinic today  · Begin PT at this time  · We will avoid medrol dose pack at this time due to her uncontrolled diabetes  · May take NSAIDs and/or tylenol as needed for pain  · Discussed with patient if she fails conservative treatments, she may need a alejandro's resection with achilles tendon debridement and repair   Return in about 2 months (around 8/8/2021)  no x-rays needed  History of Present Illness:   Chief Complaint:   Chief Complaint   Patient presents with   [address] is a 55 y o  female who is being seen for left achilles pain  Patient reports she was walking on Friday when she felt onset of pain at achilles insertion  Pain is localized at achilles tendon insertion with minimal radiating and described as sharp and severe  Patient denies numbness, tingling or radicular pain  Denies history of neuropathy  Patient does not smoke, does have diabetes (recent A1C 9 3) and does not take blood thinners  Patient denies family history of anesthesia complications and has not had any complications with anesthesia  Of note, patient reports several years ago, she had similar pain and was diagnosed with a "bone spur" but did not receive any treatment        Pain/symptom timing: Worse during the day when active  Pain/symptom context:  Worse with activites and work  Pain/symptom modifying factors:  Rest makes better, activities make worse  Pain/symptom associated signs/symptoms: none    Prior treatment   · NSAIDsYes   · Injections No   · Bracing/Orthotics Yes  - air cast given at urgent care  · Physical Therapy No     Orthopedic Surgical History:   See below    Past Medical, Surgical and Social History:  Past Medical History:  has a past medical history of Arthritis, Benign essential hypertension, Diabetes mellitus ([facility] [address] ), Diabetes mellitus type 2, controlled ([facility] [address] ), Dyspnea and respiratory abnormality, Headache(784 0), Migraine, Varicella, and Vertigo  Problem List: does not have any pertinent problems on file  Past Surgical History:  has a past surgical history that includes Knee Arthroplasty (Right); Hand surgery (09/2014); and [place] tooth extraction  Family History: family history includes Arrhythmia in her paternal grandfather; Breast cancer in her paternal grandmother; Cancer in her maternal grandmother, mother, and paternal grandmother; Diabetes in her mother and sister; Diabetes type II in her mother; Diabetes unspecified in her mother, paternal grandfather, and sister; Heart attack in her paternal grandfather; Heart disease in her paternal grandfather; Heart murmur in her father; Lupus in her mother; No Known Problems in her maternal grandfather; Other in her father  Social History:  reports that she has never smoked  She has never used smokeless tobacco  She reports current alcohol use  She reports that she does not use drugs  Current Medications: has a current medication list which includes the following prescription(s): atorvastatin, umair microlet lancets, contour next one, trulicity, contour next test, lisinopril, meclizine, meloxicam, metformin, and naproxen  Allergies: is allergic to hydrocodone-acetaminophen       Review of Systems:  General- denies fever/chills  HEENT- denies hearing loss or sore throat  Eyes- denies eye pain or visual disturbances, denies red eyes  Respiratory- denies cough or SOB  Cardio- denies chest pain or palpitations  GI- denies abdominal pain  Endocrine- denies urinary frequency  Urinary- denies pain with urination  Musculoskeletal- Negative except noted above  Skin- denies rashes or wounds  Neurological- denies dizziness or headache  Psychiatric- denies anxiety or difficulty concentrating    Physical Exam:   /93 (BP Location: Right arm, Patient Position: Sitting)   Pulse 96   Resp 20   Ht 5' 8" (1 727 m)   Wt 133 kg (292 lb 6 4 oz)   BMI 44 46 kg/m²   General/Constitutional: No apparent distress: well-nourished and well developed  Eyes: normal ocular motion  Cardio: RRR, Normal S1S2, No m/r/g  Lymphatic: No appreciable lymphadenopathy  Respiratory: Non-labored breathing, CTA b/l no w/c/r  Vascular: No edema, swelling or tenderness, except as noted in detailed exam   Integumentary: No impressive skin lesions present, except as noted in detailed exam   Neuro: No ataxia or tremors noted  Psych: Normal mood and affect, oriented to person, place and time  Appropriate affect  Musculoskeletal: Normal, except as noted in detailed exam and in HPI  Examination    Left    Gait Normal   Musculoskeletal Tender to palpation at achilles tendon insertion    Skin Normal       Nails Normal    Range of Motion  20 degrees dorsiflexion, 40 degrees plantarflexion  Subtalar motion: normal    Stability Stable    Muscle Strength 5/5 tibialis anterior  5/5 gastrocnemius-soleus  5/5 posterior tibialis  5/5 peroneal/eversion strength  5/5 EHL  5/5 FHL    Neurologic Normal    Sensation Intact to light touch throughout sural, saphenous, superficial peroneal, deep peroneal and medial/lateral plantar nerve distributions  [place] 5 07 filament (10g) testing  deferred      Cardiovascular Brisk capillary refill < 2 seconds,intact DP and PT pulses    Special Tests Negative page test (plantar flexion response is present with calf squeeze)      Imaging Studies:   3 views of the right foot were taken, reviewed and interpreted independently that demonstrate insertional achilles enthesophyte and alejandro's deformity  Reviewed by me personally  Scribe Attestation    I,:  Melissa Hernandez PA-C am acting as a scribe while in the presence of the attending physician :       I,:  Robert Moise MD personally performed the services described in this documentation    as scribed in my presence :             Adella Hagedorn Lachman, MD  Foot & Ankle Surgery   Department of [address] [place] [address]      I personally performed the service  Adella Hagedorn Lachman, MD

## 2021-06-08 NOTE — PATIENT INSTRUCTIONS
Achilles Tendonitis (Tendinitis)  Tendonitis a swelling and soreness of the tendon  The pain in the tendon (cord like structure which attaches muscle to bone) is produced by tiny tears and the inflammation present in that tendon  It commonly occurs at the shoulders, heels, and elbows  It is usually caused by overusing the tendon and joint involved  Achilles tendonitis involves the Achilles tendon  This is the large tendon in the back of the leg just above the foot  It attaches the large muscles of the lower leg to the heel bone (called calcaneus)  This diagnosis (learning what is wrong) is made by examination  X-rays will be generally be normal if only tendonitis is present  However, occasionally with insertional achilles tendinitis, there can be a calcification (enthesopathy/bone spur) noted  HOME CARE INSTRUCTIONS  · Apply ice to the injury for 10 to 20 minutes 3 or 4 times per day  Put the ice in a plastic bag and place a towel between the bag of ice and your skin  · Use Heel lifts as instructed  · Try to avoid use other than gentle range of motion while the tendon is painful  Do not resume use until instructed by your caregiver  Then begin use gradually  Do not increase use to the point of pain  If pain does develop, decrease use and continue the above measures  Gradually increase activities that do not cause discomfort until you gradually achieve normal use  · Only take over-the-counter or prescription medicines for pain, discomfort, or fever as directed by your caregiver  The Medrol dose pack, if prescribed, will only last a few days  It is important to follow the other instructions in order to see improvements that last   · Specific achilles stretches and rehab are usually required for tendinitis that does not improve with acute treatment   Typically these are done under the care of a physical therapist     [facility] [place] [facility] IF:  · Your pain and swelling increase or pain is uncontrolled with medications  · You develop new, unexplained problems (symptoms) or an increase of the symptoms that brought you to your caregiver  · You develop an inability to move your toes or foot, develop warmth and swelling in your foot, or begin running an unexplained temperature  MAKE SURE YOU:   · Understand these instructions  · Will watch your condition  · Will get help right away if you are not doing well or get worse  Rehab  Achilles tendonitis is disorder of the Achilles tendon  The Achiles tendon connects the large calf muscles (Gastrocnemius and Soleus) to the heel bone (calcaneus)  This tendon is sometimes called the heel cord  It is important for pushing-off and standing on your toes and is important for walking, running, or jumping  Tendonitis often caused by overuse and repetitive microtrauma  SYMPTOMS  · Pain, tenderness, swelling, warmth, and redness may occur over the Achilles tendon even at rest    · Pain with pushing off, or flexing or extending the ankle  · Pain that is worsened after or during activity  CAUSES  · Over use sometimes seen with rapid increase in exercise programs or in sports requiring running and jumping  · Poor physical conditioning (strength and flexibility/endurance)  · Running sports, especially training running down hills  · Inadequate warm-up before practice or play or failure to stretch before participation  · Injury to the tendon  PREVENTION   · Warm up and stretch before practice or competition  · Allow time for adequate rest and recovery between practices and competition  · Keep up conditioning  · Keep up ankle and leg flexibility  · Improve or keep muscle strength and endurance  · Improve cardiovascular fitness  · Use proper technique  · Use of proper equipment (shoes, skates, etc)   · To help prevent recurrence, taping, protective strapping, or an adhesive bandage may be recommended for several weeks after healing is complete  PROGNOSIS  · Recovery may take weeks to several months to heal    · Longer recovery is expected if symptoms have been prolonged   · Recovery is usually quicker if the inflammation is due to a direct blow as compared with overuse or sudden strain  COMPLICATIONS   · Healing time will be prolonged if the condition is not correctly treated  The injury must be given plenty of time to heal    · Symptoms can reoccur if activity is resumed too soon  · Untreated, tendinitis may increase the risk of tendon rupture requiring additional time for recovery and possibly surgery  TREATMENT   · The first treatment consists of, rest, anti-inflammatory medication and ice to relieve the pain  · Stretching and strengthening exercises after resolution of pain, will likely help reduce the risk of recurrence  Referral to a physical therapist or  for further evaluation and treatment may be helpful  · A walking boot or cast may be recommended to rest the Achilles tendon  This can help break the cycle of inflammation and microtrauma  · Arch supports (orthotics ) may be prescribed or recommended by your caregiver as an adjunct to therapy and rest    · Surgery to remove the inflamed tendon lining or degenerated tendon tissue is rarely necessary and has shown less than predictable results  MEDICATION   · Nonsteroidal anti-inflammatory medications, such as aspirin and ibuprofen, may be used for pain and inflammation relief  Do not take within 7 days before surgery  Take these as directed by your caregiver  Contact your caregiver immediately if any bleeding, stomach upset, or signs of allergic reaction occur  Other minor pain relievers, such as acetaminophen, may also be used  · Pain relievers may be prescribed as necessary by your caregiver  Do not take prescription pain medication for longer than 4 to 7 days  Use only as directed and only as much as you need     · Cortisone injections are rarely if ever indicated  Cortisone injections may weaken tendons and predispose to rupture  It is better to give the condition more time to heal than to use them  HEAT AND COLD:   · Cold is used to relieve pain and reduce inflammation for acute and chronic Achilles tendinitis  Cold should be applied for 10 to 15 minutes every 2 to 3 hours for inflammation and pain and immediately after any activity that aggravates your symptoms  Use ice packs or an ice massage  · Heat may be used before performing stretching and strengthening activities prescribed by your caregiver  Use a heat pack or a warm soak  SEEK MEDICAL CARE IF:   · Symptoms get worse or do not improve in 2 weeks despite treatment  · New, unexplained symptoms develop  Drugs used in treatment may produce side effects  EXERCISES  RANGE OF MOTION AND STRETCHING EXERCISES - Achilles Tendinitis   These exercises may help you when beginning to rehabilitate your injury  Your symptoms may resolve with or without further involvement from your physician, physical therapist or   While completing these exercises, remember:   · Restoring tissue flexibility helps normal motion to return to the joints  This allows healthier, less painful movement and activity  · An effective stretch should be held for at least 30 seconds  · A stretch should never be painful  You should only feel a gentle lengthening or release in the stretched tissue  STRETCH - Gastroc, Standing   · Place hands on wall  · Extend leg, keeping the front knee somewhat bent  · Slightly point your toes inward on your back foot  · Keeping your heel on the floor and your knee straight, shift your weight toward the wall, not allowing your back to arch  · You should feel a gentle stretch in the calf  Hold this position for 10 seconds  STRETCH - Soleus, Standing   · Place hands on wall  · Extend leg, keeping the other knee somewhat bent     · Slightly point your toes inward on your back foot  · Keep your heel on the floor, bend your back knee, and slightly shift your weight over the back leg so that you feel a gentle stretch deep in your back calf  · Hold this position for 10 seconds     STRETCH - Gastrocsoleus, Standing   Note: This exercise can place a lot of stress on your foot and ankle  Please complete this exercise only if specifically instructed by your caregiver  · Place the ball of your foot on a step, keeping your other foot firmly on the same step  · Hold on to the wall or a rail for balance  · Slowly lift your other foot, allowing your body weight to press your heel down over the edge of the step  · You should feel a stretch in your calf  · Hold this position for 10 seconds  · Repeat this exercise with a slight bend in your knee  STRENGTHENING EXERCISES - Achilles Tendinitis  These exercises may help you when beginning to rehabilitate your injury  They may resolve your symptoms with or without further involvement from your physician, physical therapist or   While completing these exercises, remember:   · Muscles can gain both the endurance and the strength needed for everyday activities through controlled exercises  · Complete these exercises as instructed by your physician, physical therapist or   Progress the resistance and repetitions only as guided  · You may experience muscle soreness or fatigue, but the pain or discomfort you are trying to eliminate should never worsen during these exercises  If this pain does worsen, stop and make certain you are following the directions exactly  If the pain is still present after adjustments, discontinue the exercise until you can discuss the trouble with your clinician  STRENGTH - Plantar-flexors   · Sit with your affected leg extended  Holding onto both ends of a rubber exercise band/tubing, loop it around the ball of your foot  Keep a slight tension in the band  · Slowly push your toes away from you, pointing them downward  · Hold this position for 10 seconds  Return slowly, controlling the tension in the band/tubing  STRENGTH - Plantar-flexors   · Stand with your feet shoulder width apart  Steady yourself with a wall or table using as little support as needed  · Keeping your weight evenly spread over the width of your feet, rise up on your toes  STRENGTH - Plantar-flexors, Eccentric   Note: This exercise can place a lot of stress on your foot and ankle  Please complete this exercise only if specifically instructed by your caregiver  · Place the balls of your feet on a step  With your hands, use only enough support from a wall or rail to keep your balance  · Keep your knees straight and rise up on your toes  · Slowly shift your weight entirely to your toes and  your opposite foot  Gently and with controlled movement, lower your weight through your foot so that your heel drops below the level of the step  You will feel a slight stretch in the back of your calf at the end position  · Use the healthy leg to help rise up onto the balls of both feet, then lower weight only on the leg again  Build up to 15 repetitions  Then progress to 3 consecutive sets of 15 repetitions  *   · After completing the above exercise, complete the same exercise with a slight knee bend (about 30 degrees)  Again, build up to 15 repetitions  Then progress to 3 consecutive sets of 15 repetitions  *   *When you easily complete 3 sets of 15, your physician, physical therapist or  may advise you to add resistance by wearing a backpack filled with additional weight  STRENGTH - Plantar Flexors, Seated   · Sit on a chair that allows your feet to rest flat on the ground  If necessary, sit at the edge of the chair  · Keeping your toes firmly on the ground, lift your heel as far as you can without increasing any discomfort in your ankle         WEAR SUPPORTIVE SNEAKERS UNTIL THIS IMPROVES  [employer], [employer] are good brands but I recommend going to a dedicate shoe store (not [employer] or [employer]) will provide you access to shoe experts that will appropriately fit your shoes  At these types of stores, they have experts that can fit you for shoes appropriate for your foot problem  [facility]  [address] [facility], [address] [facility], [address]  [address] [facility], [address] [place] [address]     [employer]  [address], [place], [address] [place] [address] [address] [facility], [address] [place] [address]      Go to [employer]  com  (Type into google "HAPAD 3/4 length heel pad" and it will come up)  Find the;  3/4 Length Heel Lifts  Hapad 9/16" thickness (Narrow for women, wide for men)

## 2021-06-08 NOTE — LETTER
June 8, 2021     MD Emanuel Vargas [address]  [address]    Patient: Phyllis De La Rosa   YOB: 1975   Date of Visit: 6/8/2021       Dear Dr Marianna Mccarthy: Thank you for referring Phyllis De La Rosa to me for evaluation  Below are my notes for this consultation  If you have questions, please do not hesitate to call me  I look forward to following your patient along with you  Sincerely,        Puneet Pennington MD        CC: No Recipients  Puneet Pennington MD  6/8/2021 12:23 PM  Signed        LIZY Francois  Attending, Orthopaedic Surgery  Foot and [address] [facility] [address] [facility]      ORTHOPAEDIC FOOT AND ANKLE CLINIC VISIT     Assessment:     Encounter Diagnoses   Name Primary?  Pain in right foot     Achilles tendinitis, right leg Yes            Plan:   · The patient verbalized understanding of exam findings and treatment plan  We engaged in the shared decision-making process and treatment options were discussed at length with the patient  Surgical and conservative management discussed today along with risks and benefits  · Gilda Soares has right insertional achilles tendinitis, which started 4 days ago  · Recommend supportive shoe wear and heel lifts, which were provided in clinic today  · Begin PT at this time  · We will avoid medrol dose pack at this time due to her uncontrolled diabetes  · May take NSAIDs and/or tylenol as needed for pain  · Discussed with patient if she fails conservative treatments, she may need a alejandro's resection with achilles tendon debridement and repair   Return in about 2 months (around 8/8/2021)  no x-rays needed  History of Present Illness:   Chief Complaint:   Chief Complaint   Patient presents with   [address] is a 55 y o  female who is being seen for left achilles pain  Patient reports she was walking on Friday when she felt onset of pain at achilles insertion    Pain is localized at achilles tendon insertion with minimal radiating and described as sharp and severe  Patient denies numbness, tingling or radicular pain  Denies history of neuropathy  Patient does not smoke, does have diabetes (recent A1C 9 3) and does not take blood thinners  Patient denies family history of anesthesia complications and has not had any complications with anesthesia  Of note, patient reports several years ago, she had similar pain and was diagnosed with a "bone spur" but did not receive any treatment  Pain/symptom timing:  Worse during the day when active  Pain/symptom context:  Worse with activites and work  Pain/symptom modifying factors:  Rest makes better, activities make worse  Pain/symptom associated signs/symptoms: none    Prior treatment   · NSAIDsYes   · Injections No   · Bracing/Orthotics Yes  - air cast given at urgent care  · Physical Therapy No     Orthopedic Surgical History:   See below    Past Medical, Surgical and Social History:  Past Medical History:  has a past medical history of Arthritis, Benign essential hypertension, Diabetes mellitus ([facility] [address] ), Diabetes mellitus type 2, controlled ([facility] [address] ), Dyspnea and respiratory abnormality, Headache([phone]), Migraine, Varicella, and Vertigo  Problem List: does not have any pertinent problems on file  Past Surgical History:  has a past surgical history that includes Knee Arthroplasty (Right); Hand surgery (09/2014); and [place] tooth extraction  Family History: family history includes Arrhythmia in her paternal grandfather; Breast cancer in her paternal grandmother; Cancer in her maternal grandmother, mother, and paternal grandmother; Diabetes in her mother and sister; Diabetes type II in her mother; Diabetes unspecified in her mother, paternal grandfather, and sister; Heart attack in her paternal grandfather; Heart disease in her paternal grandfather; Heart murmur in her father; Lupus in her mother; No Known Problems in her maternal grandfather;  Other in her father  Social History:  reports that she has never smoked  She has never used smokeless tobacco  She reports current alcohol use  She reports that she does not use drugs  Current Medications: has a current medication list which includes the following prescription(s): atorvastatin, umair microlet lancets, contour next one, trulicity, contour next test, lisinopril, meclizine, meloxicam, metformin, and naproxen  Allergies: is allergic to hydrocodone-acetaminophen  Review of Systems:  General- denies fever/chills  HEENT- denies hearing loss or sore throat  Eyes- denies eye pain or visual disturbances, denies red eyes  Respiratory- denies cough or SOB  Cardio- denies chest pain or palpitations  GI- denies abdominal pain  Endocrine- denies urinary frequency  Urinary- denies pain with urination  Musculoskeletal- Negative except noted above  Skin- denies rashes or wounds  Neurological- denies dizziness or headache  Psychiatric- denies anxiety or difficulty concentrating    Physical Exam:   /93 (BP Location: Right arm, Patient Position: Sitting)   Pulse 96   Resp 20   Ht 5' 8" (1 727 m)   Wt 133 kg (292 lb 6 4 oz)   BMI 44 46 kg/m²   General/Constitutional: No apparent distress: well-nourished and well developed  Eyes: normal ocular motion  Cardio: RRR, Normal S1S2, No m/r/g  Lymphatic: No appreciable lymphadenopathy  Respiratory: Non-labored breathing, CTA b/l no w/c/r  Vascular: No edema, swelling or tenderness, except as noted in detailed exam   Integumentary: No impressive skin lesions present, except as noted in detailed exam   Neuro: No ataxia or tremors noted  Psych: Normal mood and affect, oriented to person, place and time  Appropriate affect  Musculoskeletal: Normal, except as noted in detailed exam and in HPI      Examination    Left    Gait Normal   Musculoskeletal Tender to palpation at achilles tendon insertion    Skin Normal       Nails Normal    Range of Motion  20 degrees dorsiflexion, 40 degrees plantarflexion  Subtalar motion: normal    Stability Stable    Muscle Strength 5/5 tibialis anterior  5/5 gastrocnemius-soleus  5/5 posterior tibialis  5/5 peroneal/eversion strength  5/5 EHL  5/5 FHL    Neurologic Normal    Sensation Intact to light touch throughout sural, saphenous, superficial peroneal, deep peroneal and medial/lateral plantar nerve distributions  [place] 5 07 filament (10g) testing  deferred  Cardiovascular Brisk capillary refill < 2 seconds,intact DP and PT pulses    Special Tests Negative page test (plantar flexion response is present with calf squeeze)      Imaging Studies:   3 views of the right foot were taken, reviewed and interpreted independently that demonstrate insertional achilles enthesophyte and alejandro's deformity  Reviewed by me personally  Scribe Attestation    I,:  Melissa Hernandez PA-C am acting as a scribe while in the presence of the attending physician :       I,:  Robert Moise MD personally performed the services described in this documentation    as scribed in my presence :             Adella Hagedorn Lachman, MD  Foot & Ankle Surgery   Department [place] [address]      I personally performed the service  Adella Hagedorn Lachman, MD

## 2021-06-16 ENCOUNTER — OFFICE VISIT (OUTPATIENT)
Dept: FAMILY MEDICINE CLINIC | Facility: CLINIC | Age: 46
End: 2021-06-16
Payer: COMMERCIAL

## 2021-06-16 VITALS
HEIGHT: 68 IN | WEIGHT: 293 LBS | BODY MASS INDEX: 44.41 KG/M2 | HEART RATE: 97 BPM | SYSTOLIC BLOOD PRESSURE: 128 MMHG | OXYGEN SATURATION: 98 % | DIASTOLIC BLOOD PRESSURE: 78 MMHG | RESPIRATION RATE: 18 BRPM | TEMPERATURE: 97.2 F

## 2021-06-16 DIAGNOSIS — E11.65 TYPE 2 DIABETES MELLITUS WITH HYPERGLYCEMIA, WITHOUT LONG-TERM CURRENT USE OF INSULIN (HCC): ICD-10-CM

## 2021-06-16 DIAGNOSIS — R23.8 INFLAMMATORY PAPULE: Primary | ICD-10-CM

## 2021-06-16 DIAGNOSIS — E66.01 CLASS 3 SEVERE OBESITY DUE TO EXCESS CALORIES WITH SERIOUS COMORBIDITY AND BODY MASS INDEX (BMI) OF 40.0 TO 44.9 IN ADULT (HCC): ICD-10-CM

## 2021-06-16 DIAGNOSIS — I10 MILD ESSENTIAL HYPERTENSION: ICD-10-CM

## 2021-06-16 PROCEDURE — 3078F DIAST BP <80 MM HG: CPT | Performed by: FAMILY MEDICINE

## 2021-06-16 PROCEDURE — 3008F BODY MASS INDEX DOCD: CPT | Performed by: FAMILY MEDICINE

## 2021-06-16 PROCEDURE — 99214 OFFICE O/P EST MOD 30 MIN: CPT | Performed by: FAMILY MEDICINE

## 2021-06-16 PROCEDURE — 3074F SYST BP LT 130 MM HG: CPT | Performed by: FAMILY MEDICINE

## 2021-06-16 PROCEDURE — 1036F TOBACCO NON-USER: CPT | Performed by: FAMILY MEDICINE

## 2021-06-16 NOTE — ASSESSMENT & PLAN NOTE
Lab Results   Component Value Date    HGBA1C 9 3 (H) 05/22/2021     Uncontrolled  Recommend lifestyle modifications

## 2021-06-16 NOTE — PROGRESS NOTES
Assessment/Plan:  Problem List Items Addressed This Visit        Endocrine    Type 2 diabetes mellitus with hyperglycemia, without long-term current use of insulin ([facility] [address] )       Lab Results   Component Value Date    HGBA1C 9 3 (H) 05/22/2021     Uncontrolled  Recommend lifestyle modifications  Cardiovascular and Mediastinum    Mild essential hypertension     Stable  Check blood pressure outside of office  Recommend lifestyle modifications  Other    Class 3 severe obesity due to excess calories with serious comorbidity and body mass index (BMI) of 40 0 to 44 9 in adult (HCC)     Stable  Recommend lifestyle modifications  Other Visit Diagnoses     Inflammatory papule    -  Primary    Relevant Medications    mupirocin (BACTROBAN) 2 % ointment    Patient advised to stop picking at papule, use warm compresses, allow area to air dry, use tented band-aid PRN to prevent picking, but allow air flow  No need for oral antibiotic at this time  Return if symptoms worsen or fail to improve  Future Appointments   Date Time Provider Alma Allred   6/29/2021  4:30 PM Onel Barber PT BE PT [address] [facility]   8/10/2021  8:45 AM Jorge Alberto Tyson MD ORTHO [facility]        Subjective:     Dorman Severe is a 55 y o  female who presents today for a follow-up on her acute medical conditions  HPI:  Chief Complaint   Patient presents with    Mass     back of head, x1 week, picking at it, draining at times, a little painful      -- Above per clinical staff and reviewed  --    HPI      Today:      PTO c  Dizzy    Posterior Head Mass - Symptoms x 6 days  Unchanged  Patient has been picking at it  Slightly painful  No itching  Sometimes it drains  No OTC meds  No other masses  No sick contacts                The following portions of the patient's history were reviewed and updated as appropriate: allergies, current medications, past family history, past medical history, past social history, past surgical history and problem list       Review of Systems   Constitutional: Negative for appetite change, chills, diaphoresis, fatigue and fever  Respiratory: Negative for chest tightness and shortness of breath  Cardiovascular: Negative for chest pain  Gastrointestinal: Negative for abdominal pain, blood in stool, diarrhea, nausea and vomiting  Genitourinary: Negative for dysuria  Current Outpatient Medications   Medication Sig Dispense Refill    atorvastatin (LIPITOR) 10 mg tablet Take 1 tablet by mouth daily  90 tablet 0    Calixto Microlet Lancets lancets Use 2 (two) times a day Use as instructed 60 each 6    Blood Glucose Monitoring Suppl (Contour Next One) KIT Use 2 (two) times a day 1 kit 0    Dulaglutide (Trulicity) 3 CY/6 8WB SOPN Inject 0 5 mL (3 mg total) under the skin once a week 2 mL 5    glucose blood (Contour Next Test) test strip Use 1 each 2 (two) times a day Use as instructed 60 each 6    lisinopril (ZESTRIL) 10 mg tablet Take 1 tablet by mouth daily for blood pressure  90 tablet 0    meclizine (ANTIVERT) 12 5 MG tablet Take 1 tablet (12 5 mg total) by mouth every 8 (eight) hours as needed for dizziness 30 tablet 0    meloxicam (MOBIC) 15 mg tablet Take 1 tablet (15 mg total) by mouth daily (Patient taking differently: Take 15 mg by mouth daily as needed for moderate pain ) 30 tablet 0    metFORMIN (GLUCOPHAGE) 1000 MG tablet Take 1 tablet by mouth twice daily with meals  180 tablet 2    naproxen (NAPROSYN) 500 mg tablet Take 1 tablet by mouth daily as needed for moderate pain       mupirocin (BACTROBAN) 2 % ointment Apply topically 3 (three) times a day for 10 days 15 g 0     No current facility-administered medications for this visit         Objective:  /78   Pulse 97   Temp (!) 97 2 °F (36 2 °C) (Temporal)   Resp 18   Ht 5' 8" (1 727 m)   Wt 133 kg (293 lb 12 8 oz)   SpO2 98%   BMI 44 67 kg/m²    Wt Readings from Last 3 Encounters:   06/16/21 133 kg (293 lb 12 8 oz)   06/08/21 133 kg (292 lb 6 4 oz)   05/17/21 132 kg (291 lb 6 4 oz)      BP Readings from Last 3 Encounters:   06/16/21 128/78   06/08/21 131/93   05/17/21 132/80          Physical Exam  Vitals and nursing note reviewed  Constitutional:       Appearance: Normal appearance  She is well-developed  She is obese  HENT:      Head: Normocephalic and atraumatic  Eyes:      Conjunctiva/sclera: Conjunctivae normal    Neck:      Thyroid: No thyromegaly  Cardiovascular:      Rate and Rhythm: Normal rate and regular rhythm  Heart sounds: Normal heart sounds  Pulmonary:      Effort: Pulmonary effort is normal       Breath sounds: Normal breath sounds  Musculoskeletal:         General: No swelling  Cervical back: Neck supple  Right lower leg: No edema  Left lower leg: No edema  Skin:     Comments: Left posterior scalp above nape of neck c 2 excoriated papules approximately 3mm x 3mm each  No rubor, calor, or dolor  No D/C  Neurological:      General: No focal deficit present  Mental Status: She is alert and oriented to person, place, and time  Psychiatric:         Mood and Affect: Mood normal          Lab Results:      Lab Results   Component Value Date    WBC 10 3 05/22/2021    HGB 13 0 05/22/2021    HCT 40 0 05/22/2021     05/22/2021    TRIG 130 05/22/2021    HDL 36 (L) 05/22/2021    ALT 23 05/22/2021    AST 16 05/22/2021    K 4 6 05/22/2021     05/22/2021    CREATININE 0 69 05/22/2021    BUN 11 05/22/2021    CO2 22 05/22/2021    HGBA1C 9 3 (H) 05/22/2021     No results found for: URICACID  Invalid input(s): BASENAME Vitamin D    No results found       POCT Labs

## 2021-06-21 NOTE — PROGRESS NOTES
PT Evaluation     Today's date: 2021  Patient name: Victor M Walker  : 1975  MRN: [id number]  Referring provider: Archana Monsalve  Dx:   Encounter Diagnosis     ICD-10-CM    1  Achilles tendinitis, right leg  M76 [id number]                   Assessment  Assessment details: The patient is a 54 y/o female who presents to PT with diagnosis of R Achilles tendonitis  She has complaints of intermittent pain with most pain being along her Achilles tendon  More times with pain than without pain  She also demonstrates deficits with decreased ROM and strength, antalgic gait, decreased balance and proprioception, difficulty with stair negotiation and pain with completing her ADLs and tasks at home  She remains I with all her ADLs though she has pain with completing them  More pain noted with increased time on her feet  She ambulates without AD with slightly antalgic gait pattern, slow melissa is noted along with decreased weight shift to RLE in stance phase  She has difficulty with stair negotiation at times, typically she can go up and down the steps with reciprocal gait pattern but when painful she will go with non-reciprocal gait pattern  TTP is noted along her Achilles insertion  No swelling present t/o her foot or ankle  Secondary to pain and above deficits she is limited with her overall mobility and function  The patient would benefit from continued PT to address deficits and improve function  Tx to include ROM, stretching, strengthening, modalities, HEP, pt education, postural ed, lifting/body mechanics, neuro re-ed, balance/proprioception Te, MT and equipment          Impairments: abnormal gait, abnormal or restricted ROM, activity intolerance, impaired balance, impaired physical strength, lacks appropriate home exercise program, pain with function and weight-bearing intolerance  Other impairment: decreased flexibility  Functional limitations: difficulty with stair negotiation, difficulty sleepingUnderstanding of Dx/Px/POC: good   Prognosis: good    Goals  STGs:  1  Initiate and complete HEP with verbal cues  2   Improve R ankle ROM by 5-10 degrees in 4 weeks  3   Improve R ankle strength by 1/2 grade in 4 weeks  4   Decrease R ankle pain by > 25% in 4 weeks  LTGs:  1  Patient to be I with HEP in 8 weeks  2   Improve R ankle ROM to WNL t/o in 8 weeks to improve function  3   Improve R ankle strength to 4+ to 5/5 t/o in 8 weeks to improve function  4   Decrease R ankle pain to < or = to 2-3/10 with activity in 8 weeks to improve function  5   Patient to ambulate with normalized gait pattern in 8 weeks  6   Stair negotiation is improved to PLOF in 8 weeks  7   Recreational performance is improved to PLOF in 8 weeks  8   ADL performance is improved to PLOF in 8 weeks  9   Work performance is improved to maximal level of function in 8 weeks  Plan  Plan details: Modalities and therapy interventions prn  Patient would benefit from: skilled physical therapy  Planned modality interventions: cryotherapy, thermotherapy: hydrocollator packs, low level laser therapy and ultrasound  Other planned modality interventions: IAS  Planned therapy interventions: manual therapy, balance, balance/weight bearing training, neuromuscular re-education, patient education, postural training, self care, strengthening, stretching, therapeutic activities, therapeutic exercise, flexibility, gait training and home exercise program  Frequency: 2x week  Duration in weeks: 8  Plan of Care beginning date: 6/29/2021  Plan of Care expiration date: 8/29/2021  Treatment plan discussed with: patient        Subjective Evaluation    History of Present Illness  Mechanism of injury: The patient states that about one month she developed R ankle pain with no known cause  She had gone to [facility]   She was given an air cast which she wore until she saw the foot doctor the next day    She had gone to see the orthopedic doctor on 21  She had x-rays taken, per patient (-) for breaks but with heel spur  He did suggest she get better shoes and she also got orthotics  Per patient they seem to be helping with the pain  She was also referred to OPPT and she now presents for her evaluation  She was also given a paper with different exercises but she hasn't been doing them at home  She will be going back to see the doctor 8/10/21 for her next appointment  Intermittent use of copper sleeve on her ankle  Quality of life: good    Pain  At best pain ratin  At worst pain rating: 10  Location: R Ankle along Achilles   Quality: throbbing, sharp and discomfort  Relieving factors: ice, rest and medications  Aggravating factors: walking, standing and stair climbing    Social Support  Steps to enter house: yes  3  Stairs in house: no   Lives in: [facility]  Lives with: spouse    Employment status: working (Full time  at [address] [place] [address])    Diagnostic Tests  X-ray: normal (-) breaks but with heel spur  Patient Goals  Patient goals for therapy: increased motion, decreased pain and increased strength  Patient goal: "To have better control of my symptoms, to have less pain "          Objective     Tenderness     Right Ankle/Foot   Tenderness in the Achilles insertion  No tenderness in the proximal Achilles       Neurological Testing     Sensation     Ankle/Foot   Left Ankle/Foot   Intact: light touch    Right Ankle/Foot   Intact: light touch     Active Range of Motion   Left Ankle/Foot   Dorsiflexion (ke): 0 degrees   Plantar flexion: 60 degrees   Inversion: 20 degrees   Eversion: 18 degrees     Right Ankle/Foot   Dorsiflexion (ke): 0 degrees   Plantar flexion: 50 degrees with pain  Inversion: 20 degrees   Eversion: 16 degrees     Strength/Myotome Testing     Left Ankle/Foot   Normal strength    Right Ankle/Foot   Dorsiflexion: 4-  Plantar flexion: 4  Inversion: 4-  Eversion: 4    Swelling   Left Ankle/Foot   Metatarsal heads: 24 5 cm  Figure 8: 60 cm  [place]: 28 cm    Right Ankle/Foot   Metatarsal heads: 24 5 cm  Figure 8: 60 cm  [place]: 28 cm    Ambulation   Weight-Bearing Status   Assistive device used: none    Ambulation: Level Surfaces   Ambulation without assistive device: independent    Ambulation: Stairs   Ascend stairs: independent  Pattern: reciprocal  Descend stairs: independent  Pattern: reciprocal    Additional Stairs Ambulation Details  Typically with reciprocal gait pattern though when painful she will go with non-reciprocal gait pattern  Observational Gait   Decreased walking speed and right stance time  Precautions: Arthritis, DM, HTN, Vertigo       Manuals 6/29       R Ankle        IASTM  R Achilles Prone - ML                       Neuro Re-Ed         BOSU Lunges        SLS        Tandem Stance                                        Ther Ex        Bike        HR/TR        Ankle ROM        Ankle Circles        Calf Stretch with towel        Ankle TBand        Isometrics        Squats        ProStretch                                Ther Activity        Stepups F/L        Stepdowns        Gait Training                        Modalities        CP prn                         Reviewed HEP with patient for ankle ROM all planes and ankle circles cw/ccw  She demonstrated understanding

## 2021-06-28 NOTE — PROGRESS NOTES
Assessment/Plan:  Problem List Items Addressed This Visit        Cardiovascular and Mediastinum    Mild essential hypertension     Stable  Check blood pressure outside of office  Recommend lifestyle modifications  Other    Class 3 severe obesity due to excess calories with serious comorbidity and body mass index (BMI) of 40 0 to 44 9 in adult (HCC)     Stable  Recommend lifestyle modifications  Other Visit Diagnoses     Globus sensation    -  Primary    Relevant Orders    Ambulatory referral to Gastroenterology      Patient will likely benefit from EGD per GI to   Rule out esophageal webs or strictures  Patient encouraged to eat soft foods and cut food into tiny pieces in the interim  Screening for colorectal cancer        Relevant Orders    Ambulatory referral to Gastroenterology    Need for hepatitis C screening test        Relevant Orders    Hepatitis C antibody           Return if symptoms worsen or fail to improve  Future Appointments   Date Time Provider [place]   7/6/2021  6:15 PM Grace Flatness, PT [facility]   7/8/2021  6:15 PM Grace Flatness, PT BE [facility]   7/13/2021  5:00 PM Grace Flatness, PT BE [facility]   7/15/2021  5:00 PM Grace Flatness, PT [facility]   7/20/2021  5:00 PM Grace Flatness, PT BE [facility]   7/22/2021  5:00 PM Grace Flatness, PT BE PT [facility]   8/10/2021  8:45 AM Juan F Velez MD [facility]        Subjective:     Neisha Goff is a 55 y o  female who presents today for a follow-up on her acute medical conditions  HPI:  Chief Complaint   Patient presents with    trouble eating     -- Above per clinical staff and reviewed  --    HPI      Today:      PTO c  Eron    Globus Sensation - Symptoms x 1 month intermittently     2 days ago, when she ate sandwich, it felt like food got stuck in her throat, but she states the food wasn't truly stuck  She had to catch her breath and couldn't talk for 5-10 minutes  This is her 3rd occurrence - has also occurred c Doritos and chicken  Denies odynophagia or difficulty swallowing liquids  The following portions of the patient's history were reviewed and updated as appropriate: allergies, current medications, past family history, past medical history, past social history, past surgical history and problem list       Review of Systems   Constitutional: Negative for appetite change, chills, diaphoresis, fatigue and fever  Respiratory: Positive for cough (Chronic)  Negative for chest tightness and shortness of breath  Gastrointestinal: Negative for abdominal pain, blood in stool, diarrhea, nausea and vomiting  Genitourinary: Negative for dysuria  Current Outpatient Medications   Medication Sig Dispense Refill    atorvastatin (LIPITOR) 10 mg tablet Take 1 tablet by mouth daily  90 tablet 0    Calixto Microlet Lancets lancets Use 2 (two) times a day Use as instructed 60 each 6    Blood Glucose Monitoring Suppl (Contour Next One) KIT Use 2 (two) times a day 1 kit 0    Dulaglutide (Trulicity) 3 UK/2 0WU SOPN Inject 0 5 mL (3 mg total) under the skin once a week 2 mL 5    glucose blood (Contour Next Test) test strip Use 1 each 2 (two) times a day Use as instructed 60 each 6    lisinopril (ZESTRIL) 10 mg tablet Take 1 tablet by mouth daily for blood pressure  90 tablet 0    meclizine (ANTIVERT) 12 5 MG tablet Take 1 tablet (12 5 mg total) by mouth every 8 (eight) hours as needed for dizziness 30 tablet 0    meloxicam (MOBIC) 15 mg tablet Take 1 tablet (15 mg total) by mouth daily (Patient taking differently: Take 15 mg by mouth daily as needed for moderate pain ) 30 tablet 0    metFORMIN (GLUCOPHAGE) 1000 MG tablet Take 1 tablet by mouth twice daily with meals   180 tablet 2    naproxen (NAPROSYN) 500 mg tablet Take 1 tablet by mouth daily as needed for moderate pain        No current facility-administered medications for this visit  Objective:  /84   Pulse 87   Temp 98 °F (36 7 °C)   Resp 14   Ht 5' 8" (1 727 m)   Wt 133 kg (293 lb 6 4 oz)   SpO2 99%   BMI 44 61 kg/m²    Wt Readings from Last 3 Encounters:   06/29/21 133 kg (293 lb 6 4 oz)   06/16/21 133 kg (293 lb 12 8 oz)   06/08/21 133 kg (292 lb 6 4 oz)      BP Readings from Last 3 Encounters:   06/29/21 118/84   06/16/21 128/78   06/08/21 131/93          Physical Exam  Vitals and nursing note reviewed  Constitutional:       Appearance: Normal appearance  She is well-developed  She is obese  HENT:      Head: Normocephalic and atraumatic  Right Ear: Tympanic membrane, ear canal and external ear normal       Left Ear: Tympanic membrane, ear canal and external ear normal       Nose: Nose normal       Right Sinus: No maxillary sinus tenderness or frontal sinus tenderness  Left Sinus: No maxillary sinus tenderness or frontal sinus tenderness  Mouth/Throat:      Mouth: Mucous membranes are moist       Pharynx: Oropharynx is clear  Uvula midline  No pharyngeal swelling, oropharyngeal exudate, posterior oropharyngeal erythema or uvula swelling  Tonsils: No tonsillar exudate  Eyes:      Conjunctiva/sclera: Conjunctivae normal    Cardiovascular:      Rate and Rhythm: Normal rate and regular rhythm  Heart sounds: Normal heart sounds  Pulmonary:      Effort: Pulmonary effort is normal       Breath sounds: Normal breath sounds  Musculoskeletal:         General: No swelling or tenderness  Cervical back: Neck supple  Right lower leg: No edema  Left lower leg: No edema  Lymphadenopathy:      Cervical: No cervical adenopathy  Skin:     Findings: No rash  Neurological:      General: No focal deficit present  Mental Status: She is alert and oriented to person, place, and time     Psychiatric:         Mood and Affect: Mood normal          Behavior: Behavior normal          Thought Content: Thought content normal          Judgment: Judgment normal          Lab Results:      Lab Results   Component Value Date    WBC 10 3 05/22/2021    HGB 13 0 05/22/2021    HCT 40 0 05/22/2021     05/22/2021    TRIG 130 05/22/2021    HDL 36 (L) 05/22/2021    ALT 23 05/22/2021    AST 16 05/22/2021    K 4 6 05/22/2021     05/22/2021    CREATININE 0 69 05/22/2021    BUN 11 05/22/2021    CO2 22 05/22/2021    HGBA1C 9 3 (H) 05/22/2021     No results found for: URICACID  Invalid input(s): BASENAME Vitamin D    XR foot 3+ vw right    Result Date: 6/17/2021  Narrative: RIGHT FOOT INDICATION:   M79 [id number]: Pain in right foot  COMPARISON:  None VIEWS:  XR FOOT 3+ VW RIGHT FINDINGS: There is no acute fracture or dislocation  There is calcification at the insertion of the Achilles tendon  There is a tiny plantar calcaneal spur  There is bony prominence of the posterior superior aspect of the calcaneus  No lytic or blastic osseous lesion  Soft tissues are unremarkable  Impression: No acute osseous abnormality   Workstation performed: [id number]        [facility]

## 2021-06-29 ENCOUNTER — EVALUATION (OUTPATIENT)
Dept: PHYSICAL THERAPY | Facility: CLINIC | Age: 46
End: 2021-06-29
Payer: COMMERCIAL

## 2021-06-29 ENCOUNTER — OFFICE VISIT (OUTPATIENT)
Dept: FAMILY MEDICINE CLINIC | Facility: CLINIC | Age: 46
End: 2021-06-29
Payer: COMMERCIAL

## 2021-06-29 VITALS
BODY MASS INDEX: 44.41 KG/M2 | SYSTOLIC BLOOD PRESSURE: 118 MMHG | DIASTOLIC BLOOD PRESSURE: 84 MMHG | OXYGEN SATURATION: 99 % | WEIGHT: 293 LBS | HEART RATE: 87 BPM | RESPIRATION RATE: 14 BRPM | TEMPERATURE: 98 F | HEIGHT: 68 IN

## 2021-06-29 DIAGNOSIS — Z12.12 SCREENING FOR COLORECTAL CANCER: ICD-10-CM

## 2021-06-29 DIAGNOSIS — M76.61 ACHILLES TENDINITIS, RIGHT LEG: Primary | ICD-10-CM

## 2021-06-29 DIAGNOSIS — I10 MILD ESSENTIAL HYPERTENSION: ICD-10-CM

## 2021-06-29 DIAGNOSIS — Z11.59 NEED FOR HEPATITIS C SCREENING TEST: ICD-10-CM

## 2021-06-29 DIAGNOSIS — R09.89 GLOBUS SENSATION: Primary | ICD-10-CM

## 2021-06-29 DIAGNOSIS — Z12.11 SCREENING FOR COLORECTAL CANCER: ICD-10-CM

## 2021-06-29 DIAGNOSIS — E66.01 CLASS 3 SEVERE OBESITY DUE TO EXCESS CALORIES WITH SERIOUS COMORBIDITY AND BODY MASS INDEX (BMI) OF 40.0 TO 44.9 IN ADULT (HCC): ICD-10-CM

## 2021-06-29 PROCEDURE — 97161 PT EVAL LOW COMPLEX 20 MIN: CPT | Performed by: PHYSICAL THERAPIST

## 2021-06-29 PROCEDURE — 1036F TOBACCO NON-USER: CPT | Performed by: FAMILY MEDICINE

## 2021-06-29 PROCEDURE — 99214 OFFICE O/P EST MOD 30 MIN: CPT | Performed by: FAMILY MEDICINE

## 2021-06-29 PROCEDURE — 3079F DIAST BP 80-89 MM HG: CPT | Performed by: FAMILY MEDICINE

## 2021-06-29 PROCEDURE — 3074F SYST BP LT 130 MM HG: CPT | Performed by: FAMILY MEDICINE

## 2021-06-29 NOTE — PATIENT INSTRUCTIONS

## 2021-07-06 ENCOUNTER — OFFICE VISIT (OUTPATIENT)
Dept: PHYSICAL THERAPY | Facility: CLINIC | Age: 46
End: 2021-07-06
Payer: COMMERCIAL

## 2021-07-06 DIAGNOSIS — M76.61 ACHILLES TENDINITIS, RIGHT LEG: Primary | ICD-10-CM

## 2021-07-06 PROCEDURE — 97110 THERAPEUTIC EXERCISES: CPT

## 2021-07-06 NOTE — PROGRESS NOTES
Daily Note     Today's date: 2021  Patient name: Ye Verdin  : 1975  MRN: [id number]  Referring provider: Luis Coelho  Dx:   Encounter Diagnosis     ICD-10-CM    1  Achilles tendinitis, right leg  M76 61                   Subjective: Pt reports she has only had one "bad day" since her IE  Denies any pain prior to start of session today  Has been compliant with HEP  Objective: See treatment diary below  Red TB provided for home to perform TB ankle 4-way  Assessment: Tolerated treatment well  Initiated POC as outlined below  Slight pain in R heel/medial achilles reported during standing TR, as well as pain in heel with counter clockwise AROM  Remaining reps of each exercise held and symptoms resolved with rest   Limited PROM into R DF of L ankle  Increased tone and soft tissue restrictions present along R achilles during manual IASTM  Patient would benefit from continued PT to further improve strength, reduce pain, and maximize overall function  Plan: Continue per plan of care  Monitor response to initial treatment NV  Precautions: Arthritis, DM, HTN, Vertigo       Manuals       R Ankle  PROM 4 min      IASTM  R Achilles Prone - ML R Achilles Prone - 8 min                      Neuro Re-Ed         KENTRELL Lungshyanne        SLS        Tandem Stance                                        Ther Ex        Bike  L1 5 min      HR/TR  HR 2x10  TR 1x10      Ankle ROM  x20 ea      Ankle Circles  x20 cw  x15 ccw P!       Calf Stretch with towel  20"x4      Ankle TBand  4 way  Red 1x10 ea      Isometrics        Squats        ProStretch                                Ther Activity        Stepups F/L        Stepdowns        Step up and over fwd  R only  4" 2x10      Gait Training                        Modalities        CP prn

## 2021-07-08 ENCOUNTER — OFFICE VISIT (OUTPATIENT)
Dept: PHYSICAL THERAPY | Facility: CLINIC | Age: 46
End: 2021-07-08
Payer: COMMERCIAL

## 2021-07-08 DIAGNOSIS — M76.61 ACHILLES TENDINITIS, RIGHT LEG: Primary | ICD-10-CM

## 2021-07-08 PROCEDURE — 97110 THERAPEUTIC EXERCISES: CPT | Performed by: PHYSICAL THERAPIST

## 2021-07-08 NOTE — PROGRESS NOTES
Daily Note     Today's date: 2021  Patient name: Francine oSrensen  : 1975  MRN: [id number]  Referring provider: Evaristo Shabazz PA-C  Dx:   No diagnosis found  Subjective: Pt reports increased pain after last session that has slowly improved  Objective: See treatment diary below  Red TB provided for home to perform TB ankle 4-way  Assessment: The patient responded well to changes to TE program today  Updated HEP to include eccentric heel raises and gastroc stretch  Moderate to high tone noted during manuals  Plan: Continue per plan of care  Monitor response to treatment NV          Precautions: Arthritis, DM, HTN, Vertigo       Manuals       R Ankle  PROM 4 min      IASTM  R Achilles Prone - ML R Achilles Prone - 8 min                      Neuro Re-Ed         BENITOU Lungshyanne        SLS        Tandem Stance                                        Ther Ex        Bike  L1 5 min      HR/TR  HR 2x10  TR 1x10      Ankle ROM  x20 ea 1x30     Ankle Circles  x20 cw  x15 ccw P! 1x20 CW     Calf Stretch with towel  20"x4      Ankle TBand  4 way  Red 1x10 ea 4 way YTB 2x10 ea     DL Ecc Heel Raise   2x10 ea     Stand Gastroc St     20"x4                                     Ther Activity        Stepups F/L        Stepdowns        Step up and over fwd  R only  4" 2x10      Gait Training                        Modalities        CP prn

## 2021-07-13 ENCOUNTER — OFFICE VISIT (OUTPATIENT)
Dept: PHYSICAL THERAPY | Facility: CLINIC | Age: 46
End: 2021-07-13
Payer: COMMERCIAL

## 2021-07-13 DIAGNOSIS — M76.61 ACHILLES TENDINITIS, RIGHT LEG: Primary | ICD-10-CM

## 2021-07-13 PROCEDURE — 97110 THERAPEUTIC EXERCISES: CPT

## 2021-07-13 NOTE — PROGRESS NOTES
Daily Note     Today's date: 2021  Patient name: Karina Yun  : 1975  MRN: [id number]  Referring provider: Evans Gregory  Dx:   Encounter Diagnosis     ICD-10-CM    1  Achilles tendinitis, right leg  M76 61                   Subjective: Pt reports having significant increase in pain along R achilles after wearing steel toe boots at work for about an hour today  Received ambulating with limited stance phase on RLE and antalgic gait  Pt reported decrease in pain and presented with improved gait mechanics post treatment  Objective: See treatment diary below      Assessment: Tolerated treatment well  Continued with program as outlined below  No progressions made d/t symptoms reported at start of session  Was able to perform all exercise with no increase in symptoms  Responded well to manual manual treatment  Increased tone present along R achilles tendon, which did begin to resolve with IASTM  Patient would benefit from continued PT to further reduce pain/frequency of symptoms and maximize overall function  Plan: Continue per plan of care           Precautions: Arthritis, DM, HTN, Vertigo       Manuals     R Ankle  PROM 4 min  PROM 3 min    IASTM  R Achilles Prone - ML R Achilles Prone - 8 min  R Achilles Prone - 10 min                    Neuro Re-Ed         BOSU Lunges        SLS        Tandem Stance                                        Ther Ex        Bike  L1 5 min      HR/TR  HR 2x10  TR 1x10      Ankle ROM  x20 ea 1x30 1x30    Ankle Circles  x20 cw  x15 ccw P! 1x20 CW 1x20 cw    Calf Stretch with towel  20"x4      Ankle TBand  4 way  Red 1x10 ea 4 way YTB 2x10 ea 4 way YTB 2x10 ea    DL Ecc Heel Raise   2x10 ea 2x10    Stand Gastroc St     20"x4 20"x4                                    Ther Activity        Stepups F/L        Stepdowns        Step up and over fwd  R only  4" 2x10      Gait Training                        Modalities        CP prn

## 2021-07-15 ENCOUNTER — OFFICE VISIT (OUTPATIENT)
Dept: PHYSICAL THERAPY | Facility: CLINIC | Age: 46
End: 2021-07-15
Payer: COMMERCIAL

## 2021-07-15 DIAGNOSIS — M76.61 ACHILLES TENDINITIS, RIGHT LEG: Primary | ICD-10-CM

## 2021-07-15 PROCEDURE — 97110 THERAPEUTIC EXERCISES: CPT

## 2021-07-15 NOTE — PROGRESS NOTES
Daily Note     Today's date: 7/15/2021  Patient name: Cisco Ivey  : 1975  MRN: [id number]  Referring provider: Dmitry Pollock  Dx:   Encounter Diagnosis     ICD-10-CM    1  Achilles tendinitis, right leg  M76 61                   Subjective: Pt reports R achilles has been feeling better, compare to LV  Notes she has been experiencing "twinges" along R lat malleolus  Describes this "twinge" as sharp and "comes and goes "        Objective: See treatment diary below      Assessment: Tolerated treatment well  Continued program as outlined below  Was able to perform ccw anle circles with no pain today  Continues to respond well to manual treatment noting decrease in symptoms by end of session  Progressed program with A/P rockerboard taps this visit, in effort to further re-educate R foot/ankle musculature  Patient would benefit from continued PT to further improve strength, reduce pain, and maximize overall function with ADL's  Plan: Continue per plan of care           Precautions: Arthritis, DM, HTN, Vertigo       Manuals 6/29 7/6  7/13 7/15   R Ankle  PROM 4 min  PROM 3 min PROM 3 min   IASTM  R Achilles Prone - ML R Achilles Prone - 8 min  R Achilles Prone - 10 min R Achilles Prone - 10 min                   Neuro Re-Ed         BOSU Lunges        SLS        Tandem Stance        Rockerboard tap     A/P only  x15 ea                           Ther Ex        Bike  L1 5 min      HR/TR  HR 2x10  TR 1x10      Ankle ROM  x20 ea 1x30 1x30 1x30   Ankle Circles  x20 cw  x15 ccw P! 1x20 CW 1x20 cw 1x20 cw/ccw   Calf Stretch with towel  20"x4      Ankle TBand  4 way  Red 1x10 ea 4 way YTB 2x10 ea 4 way YTB 2x10 ea 4 way YTB 2x10 ea   DL Ecc Heel Raise   2x10 ea 2x10 2x10   Stand Gastroc St     20"x4 20"x4 20"x4                                   Ther Activity        Stepups F/L        Stepdowns        Step up and over fwd  R only  4" 2x10      Gait Training                        Modalities        CP prn

## 2021-07-20 ENCOUNTER — OFFICE VISIT (OUTPATIENT)
Dept: PHYSICAL THERAPY | Facility: CLINIC | Age: 46
End: 2021-07-20
Payer: COMMERCIAL

## 2021-07-20 DIAGNOSIS — M76.61 ACHILLES TENDINITIS, RIGHT LEG: Primary | ICD-10-CM

## 2021-07-20 PROCEDURE — 97110 THERAPEUTIC EXERCISES: CPT | Performed by: PHYSICAL THERAPIST

## 2021-07-20 NOTE — PROGRESS NOTES
Daily Note     Today's date: 2021  Patient name: Denise Wei  : 1975  MRN: [id number]  Referring provider: Zak Del Cid  Dx:   Encounter Diagnosis     ICD-10-CM    1  Achilles tendinitis, right leg  M76 61        Start Time:   Stop Time: 173  Total time in clinic (min): 42 minutes    Subjective: Pt reports 5/10 achilles pain described as "twingy " She notes 6/10 pain on average over the past week  Objective: See treatment diary below      Assessment: The patient exhibited good tolerance to strengthening progressions  Updated repetitions for HEP at home  Discussed upcoming road trip, taking frequent breaks to stretch, and utilizing cruise control to minimize foot pain  Plan: Continue per plan of care  Progress calf strengthening as tolerated  Increase resistance on ankle 3-way to red TB NV           Precautions: Arthritis, DM, HTN, Vertigo       Manuals 7/20   7/13 7/15   R Ankle PROM 3 min   PROM 3 min PROM 3 min   IASTM: achilles 10 min   R Achilles Prone - 10 min R Achilles Prone - 10 min                   Neuro Re-Ed         SLS NV       Tandem Stance        Rockerboard A/P tap 1x20 ea  1x15 ea    A/P only  x15 ea                           Ther Ex        Ankle ROM 1x30 ea   1x30 1x30   Ankle Circles D/C   1x20 cw 1x20 cw/ccw   Towel Scrunches 1x50       Ankle 3-way TBand YTB 3x20 ea RTB 3x20 ea  4 way YTB 2x10 ea 4 way YTB 2x10 ea   DL Ecc Heel Raise 3x10   2x10 2x10   Stand Gastroc St   20"x4   20"x4 20"x4                                   Ther Activity        Stepups F/L        Stepdowns        Step up and over fwd        Gait Training                        Modalities        CP prn

## 2021-07-22 ENCOUNTER — OFFICE VISIT (OUTPATIENT)
Dept: PHYSICAL THERAPY | Facility: CLINIC | Age: 46
End: 2021-07-22
Payer: COMMERCIAL

## 2021-07-22 DIAGNOSIS — M76.61 ACHILLES TENDINITIS, RIGHT LEG: Primary | ICD-10-CM

## 2021-07-22 PROCEDURE — 97110 THERAPEUTIC EXERCISES: CPT | Performed by: PHYSICAL THERAPIST

## 2021-07-22 NOTE — PROGRESS NOTES
Daily Note     Today's date: 2021  Patient name: Wilfredo Rapp  : 1975  MRN: [id number]  Referring provider: Violet Colmenares PA-C  Dx:   No diagnosis found  Subjective: Pt reports 8/10 pain today possibly due to her shoes  Objective: See treatment diary below      Assessment: The patient poor tolerance to SLS secondary to pain  Good tolerance to increased resistance with TB exercises  Continued moderate to high achilles tendon tone noted with IASTM  Plan: Continue per plan of care  Progress calf strengthening as tolerated           Precautions: Arthritis, DM, HTN, Vertigo       Manuals 7/20 7/22  7/13 7/15   R Ankle PROM 3 min 3 min  PROM 3 min PROM 3 min   IASTM: achilles 10 min 10 min  R Achilles Prone - 10 min R Achilles Prone - 10 min                   Neuro Re-Ed         SLS NV 30"x1      Tandem Stance        Rockerboard A/P tap 1x20 ea  1x15 ea 2x15   A/P only  x15 ea                           Ther Ex        Ankle ROM 1x30 ea 1x30 ea  1x30 1x30   Ankle Circles D/C   1x20 cw 1x20 cw/ccw   Towel Scrunches 1x50 1x50      Ankle 3-way TBand YTB 3x20 ea RTB 3x20 ea  4 way YTB 2x10 ea 4 way YTB 2x10 ea   DL Ecc Heel Raise 3x10 3x10  2x10 2x10   Stand Gastroc St   20"x4 20"x4  20"x4 20"x4                                   Ther Activity        Stepups F/L        Stepdowns        Step up and over fwd        Gait Training                        Modalities        CP prn

## 2021-07-26 ENCOUNTER — OFFICE VISIT (OUTPATIENT)
Dept: PHYSICAL THERAPY | Facility: CLINIC | Age: 46
End: 2021-07-26
Payer: COMMERCIAL

## 2021-07-26 DIAGNOSIS — M76.61 ACHILLES TENDINITIS, RIGHT LEG: Primary | ICD-10-CM

## 2021-07-26 PROCEDURE — 97112 NEUROMUSCULAR REEDUCATION: CPT | Performed by: PHYSICAL THERAPIST

## 2021-07-26 NOTE — PROGRESS NOTES
Daily Note     Today's date: 2021  Patient name: Marykay Holstein  : 1975  MRN: [id number]  Referring provider: Quinton Tatum PA-C  Dx:   No diagnosis found  Subjective: Pt reports no pain today or over the weekend  Objective: See treatment diary below      Assessment: The patient exhibited improved tolerance to SLS but still demonstrated difficulty with the exercise  Slight improvement in achilles tone during IASTM  Instructed the patient to increase repetitions of Inv/Ev TB exercise at home  Plan: Continue per plan of care  Progress calf strengthening as tolerated           Precautions: Arthritis, DM, HTN, Vertigo       Manuals      R Ankle PROM 3 min 3 min np     IASTM: achilles 10 min 10 min 10 min                     Neuro Re-Ed         SLS NV 30"x1 30"x2     Tandem Stance        Rockerboard A/P tap 1x20 ea  1x15 ea 2x15 2x15                             Ther Ex        Ankle ROM 1x30 ea 1x30 ea 1x30 ea     Ankle Circles D/C       Towel Scrunches 1x50 1x50 1x50     Ankle 3-way TBand YTB 3x20 ea RTB 3x20 ea RTB 3x20 ea RTB 3x22 ea    DL Ecc Heel Raise 3x10 3x10 3x10 3x12    Stand Gastroc St   20"x4 20"x4 20"x4                                     Ther Activity        Stepups F/L        Stepdowns        Step up and over fwd        Gait Training                        Modalities        CP prn

## 2021-07-29 ENCOUNTER — OFFICE VISIT (OUTPATIENT)
Dept: PHYSICAL THERAPY | Facility: CLINIC | Age: 46
End: 2021-07-29
Payer: COMMERCIAL

## 2021-07-29 DIAGNOSIS — M76.61 ACHILLES TENDINITIS, RIGHT LEG: Primary | ICD-10-CM

## 2021-07-29 PROCEDURE — 97112 NEUROMUSCULAR REEDUCATION: CPT

## 2021-07-29 PROCEDURE — 97140 MANUAL THERAPY 1/> REGIONS: CPT

## 2021-07-29 PROCEDURE — 97110 THERAPEUTIC EXERCISES: CPT

## 2021-07-29 NOTE — PROGRESS NOTES
Daily Note     Today's date: 2021  Patient name: Shelia Chavez  : 1975  MRN: [id number]  Referring provider: Aaron Krause  Dx:   Encounter Diagnosis     ICD-10-CM    1  Achilles tendinitis, right leg  M76 61                   Subjective: Pt reports having slight "twinge" in R ankle today, but notes she feels like she continues to see improvement with both symptoms and function of R ankle  Objective: See treatment diary below      Assessment: Tolerated treatment well  Progressed program with increased reps of TB 3-way and DL heel raise today with no complaints of pain  Continues to require occasional use of hand rail for stabilization during SLS, but is making good progress with control and stabilization since first beginning this intervention  Patient would benefit from continued PT to further reduce pain/frequency of symptoms and maximize overall function  Plan: Continue per plan of care           Precautions: Arthritis, DM, HTN, Vertigo       Manuals     R Ankle PROM 3 min 3 min np     IASTM: achilles 10 min 10 min 10 min 10 min                    Neuro Re-Ed         SLS NV 30"x1 30"x2 30"x2    Tandem Stance        Rockerboard A/P tap 1x20 ea  1x15 ea 2x15 2x15 2x15                            Ther Ex        Ankle ROM 1x30 ea 1x30 ea 1x30 ea 1x30 ea    Ankle Circles D/C       Towel Scrunches 1x50 1x50 1x50 1x50    Ankle 3-way TBand YTB 3x20 ea RTB 3x20 ea RTB 3x20 ea RTB 3x22 ea    DL Ecc Heel Raise 3x10 3x10 3x10 3x12    Stand Gastroc St   20"x4 20"x4 20"x4 20"x4                                    Ther Activity        Stepups F/L        Stepdowns        Step up and over fwd        Gait Training                        Modalities        CP prn

## 2021-07-31 DIAGNOSIS — I10 MILD ESSENTIAL HYPERTENSION: ICD-10-CM

## 2021-07-31 DIAGNOSIS — E78.5 HYPERLIPIDEMIA, UNSPECIFIED HYPERLIPIDEMIA TYPE: ICD-10-CM

## 2021-08-02 RX ORDER — LISINOPRIL 10 MG/1
TABLET ORAL
Qty: 90 TABLET | Refills: 0 | OUTPATIENT
Start: 2021-08-02

## 2021-08-02 RX ORDER — ATORVASTATIN CALCIUM 10 MG/1
TABLET, FILM COATED ORAL
Qty: 90 TABLET | Refills: 0 | OUTPATIENT
Start: 2021-08-02

## 2021-08-09 ENCOUNTER — OFFICE VISIT (OUTPATIENT)
Dept: PHYSICAL THERAPY | Facility: CLINIC | Age: 46
End: 2021-08-09
Payer: COMMERCIAL

## 2021-08-09 DIAGNOSIS — M76.61 ACHILLES TENDINITIS, RIGHT LEG: Primary | ICD-10-CM

## 2021-08-09 PROCEDURE — 97112 NEUROMUSCULAR REEDUCATION: CPT | Performed by: PHYSICAL THERAPIST

## 2021-08-09 NOTE — PROGRESS NOTES
Daily Note     Today's date: 2021  Patient name: Dm Carter  : 1975  MRN: [id number]  Referring provider: Marlys Sosa PA-C  Dx:   No diagnosis found  Subjective: Pt reports no pain currently but increased pain after [place]  The second fall she fell and landed on her hip and tweaked her ankle  She reported her HEP did relieve some of her pain post-fall but still is not back to her pain levels pre-fall  Objective: See treatment diary below      Assessment: No progressions today due to patient's increased pain levels  Good tolerance to strengthening  Mild difficulty with SLS  Instructed patient to increased DL heel raises to 3x15 if pain free the rest of the evening  Plan: Continue per plan of care  Progress strengthening as tolerated           Precautions: Arthritis, DM, HTN, Vertigo       Manuals    R Ankle PROM 3 min 3 min np     IASTM: achilles 10 min 10 min 10 min 10 min 10 min                   Neuro Re-Ed         SLS NV 30"x1 30"x2 30"x2 30"x2   Tandem Stance        Rockerboard A/P tap 1x20 ea  1x15 ea 2x15 2x15 2x15 2x15                           Ther Ex        Ankle ROM 1x30 ea 1x30 ea 1x30 ea 1x30 ea 1x30 ea   Ankle Circles D/C       Towel Scrunches 1x50 1x50 1x50 1x50 1x55   Ankle 3-way TBand YTB 3x20 ea RTB 3x20 ea RTB 3x20 ea RTB 3x22 ea RTB 3x22 ea   DL Ecc Heel Raise 3x10 3x10 3x10 3x12 3x12   Stand Gastroc St   20"x4 20"x4 20"x4 20"x4 20"x4                                   Ther Activity                                Gait Training                        Modalities        CP prn

## 2021-08-10 ENCOUNTER — OFFICE VISIT (OUTPATIENT)
Dept: OBGYN CLINIC | Facility: CLINIC | Age: 46
End: 2021-08-10
Payer: COMMERCIAL

## 2021-08-10 VITALS
WEIGHT: 290 LBS | SYSTOLIC BLOOD PRESSURE: 126 MMHG | DIASTOLIC BLOOD PRESSURE: 84 MMHG | BODY MASS INDEX: 43.95 KG/M2 | HEART RATE: 76 BPM | RESPIRATION RATE: 17 BRPM | HEIGHT: 68 IN

## 2021-08-10 DIAGNOSIS — E78.5 HYPERLIPIDEMIA, UNSPECIFIED HYPERLIPIDEMIA TYPE: ICD-10-CM

## 2021-08-10 DIAGNOSIS — M76.61 ACHILLES TENDINITIS, RIGHT LEG: Primary | ICD-10-CM

## 2021-08-10 DIAGNOSIS — I10 MILD ESSENTIAL HYPERTENSION: ICD-10-CM

## 2021-08-10 PROCEDURE — 99213 OFFICE O/P EST LOW 20 MIN: CPT | Performed by: ORTHOPAEDIC SURGERY

## 2021-08-10 PROCEDURE — 3079F DIAST BP 80-89 MM HG: CPT | Performed by: ORTHOPAEDIC SURGERY

## 2021-08-10 PROCEDURE — 1036F TOBACCO NON-USER: CPT | Performed by: ORTHOPAEDIC SURGERY

## 2021-08-10 PROCEDURE — 3008F BODY MASS INDEX DOCD: CPT | Performed by: ORTHOPAEDIC SURGERY

## 2021-08-10 PROCEDURE — 3074F SYST BP LT 130 MM HG: CPT | Performed by: ORTHOPAEDIC SURGERY

## 2021-08-10 PROCEDURE — 4010F ACE/ARB THERAPY RXD/TAKEN: CPT | Performed by: ORTHOPAEDIC SURGERY

## 2021-08-10 RX ORDER — ATORVASTATIN CALCIUM 10 MG/1
10 TABLET, FILM COATED ORAL DAILY
Qty: 90 TABLET | Refills: 1 | Status: SHIPPED | OUTPATIENT
Start: 2021-08-10 | End: 2022-02-07 | Stop reason: SDUPTHER

## 2021-08-10 RX ORDER — LISINOPRIL 10 MG/1
10 TABLET ORAL DAILY
Qty: 90 TABLET | Refills: 1 | Status: SHIPPED | OUTPATIENT
Start: 2021-08-10 | End: 2022-02-07 | Stop reason: SDUPTHER

## 2021-08-10 NOTE — PROGRESS NOTES
LIZY Greer  Attending, Orthopaedic Surgery  [facility] and [address] [facility] [address] [facility]      ORTHOPAEDIC FOOT AND ANKLE CLINIC VISIT     Assessment:     Encounter Diagnosis   Name Primary?  Achilles tendinitis, right leg Yes            Plan:   · The patient verbalized understanding of exam findings and treatment plan  We engaged in the shared decision-making process and treatment options were discussed at length with the patient  Surgical and conservative management discussed today along with risks and benefits  · Patient has made considerable improvement with her pain at this time  · We discussed that she should continues non-surgical treatments at this time  · Continue PT, discharge when ready  · Discussed that if her symptoms return or worsen, she would be a candidate for Melanie's resection  Return if symptoms worsen or fail to improve  History of Present Illness:   Chief Complaint:   Chief Complaint   Patient presents with    Right Foot - Follow-up    Right Ankle - Follow-up     Lucille Phan is a 55 y o  female who is being seen in follow-up for Right achilles tendinitis  When we last saw she we recommended PT and a heel lift  Pain has improved  Residual pain is localized at the heel with minimal radiating and described as sharp and severe  She did not like the heel lift provided to her at her last visit so she is now wearing a silicone heel cup which she likes  She is also working on controlling her diabetes         Pain/symptom timing:  Worse during the day when active  Pain/symptom context:  Worse with activites and work  Pain/symptom modifying factors:  Rest makes better, activities make worse  Pain/symptom associated signs/symptoms: none    Prior treatment   · NSAIDsYes   · Injections No   · Bracing/Orthotics Yes    · Physical Therapy Yes     Orthopedic Surgical History:   See below    Past Medical, Surgical and Social History:  Past Medical History:  has a past medical history of Arthritis, Benign essential hypertension, Diabetes mellitus ([facility] [address] ), Diabetes mellitus type 2, controlled ([facility] [address] ), Dyspnea and respiratory abnormality, Headache(784 0), Migraine, Varicella, and Vertigo  Problem List: does not have any pertinent problems on file  Past Surgical History:  has a past surgical history that includes Knee Arthroplasty (Right); Hand surgery (09/2014); and [place] tooth extraction  Family History: family history includes Arrhythmia in her paternal grandfather; Breast cancer in her paternal grandmother; Cancer in her maternal grandmother, mother, and paternal grandmother; Diabetes in her mother and sister; Diabetes type II in her mother; Diabetes unspecified in her mother, paternal grandfather, and sister; Heart attack in her paternal grandfather; Heart disease in her paternal grandfather; Heart murmur in her father; Lupus in her mother; No Known Problems in her maternal grandfather; Other in her father  Social History:  reports that she has never smoked  She has never used smokeless tobacco  She reports current alcohol use  She reports that she does not use drugs  Current Medications: has a current medication list which includes the following prescription(s): atorvastatin, umair microlet lancets, contour next one, trulicity, contour next test, lisinopril, meclizine, meloxicam, metformin, and naproxen  Allergies: is allergic to hydrocodone-acetaminophen       Review of Systems:  General- denies fever/chills  HEENT- denies hearing loss or sore throat  Eyes- denies eye pain or visual disturbances, denies red eyes  Respiratory- denies cough or SOB  Cardio- denies chest pain or palpitations  GI- denies abdominal pain  Endocrine- denies urinary frequency  Urinary- denies pain with urination  Musculoskeletal- Negative except noted above  Skin- denies rashes or wounds  Neurological- denies dizziness or headache  Psychiatric- denies anxiety or difficulty concentrating    Physical Exam: /84 (BP Location: Right arm, Patient Position: Sitting)   Pulse 76   Resp 17   Ht 5' 8" (1 727 m)   Wt 132 kg (290 lb)   BMI 44 09 kg/m²   General/Constitutional: No apparent distress: well-nourished and well developed  Eyes: normal ocular motion  Lymphatic: No appreciable lymphadenopathy  Respiratory: Non-labored breathing  Vascular: No edema, swelling or tenderness, except as noted in detailed exam   Integumentary: No impressive skin lesions present, except as noted in detailed exam   Neuro: No ataxia or tremors noted  Psych: Normal mood and affect, oriented to person, place and time  Appropriate affect  Musculoskeletal: Normal, except as noted in detailed exam and in HPI  Examination    Right    Gait                Normal   Musculoskeletal Tender to palpation at achilles insertion, minimal    Skin Normal       Nails Normal    Range of Motion  10 degrees dorsiflexion, 40 degrees plantarflexion  Subtalar motion: normal    Stability Stable    Muscle Strength 5/5 tibialis anterior  5/5 gastrocnemius-soleus  5/5 posterior tibialis  5/5 peroneal/eversion strength  5/5 EHL  5/5 FHL    Neurologic Normal    Sensation Intact to light touch throughout sural, saphenous, superficial peroneal, deep peroneal and medial/lateral plantar nerve distributions  Shandon-Greer 5 07 filament (10g) testing deferred  Cardiovascular Brisk capillary refill < 2 seconds,intact DP and PT pulses    Special Tests None      Imaging Studies:   No new imaging        James R Lachman, MD  Foot & Ankle Surgery   Department [address]      I personally performed the service  Larene Frames Lachman, MD    Scribe Attestation    I,:  Mary Martinez MA am acting as a scribe while in the presence of the attending physician :       I,:  Sue Marquez MD personally performed the services described in this documentation    as scribed in my presence :

## 2021-08-12 ENCOUNTER — TELEPHONE (OUTPATIENT)
Dept: ENDOCRINOLOGY | Facility: CLINIC | Age: 46
End: 2021-08-12

## 2021-08-12 NOTE — TELEPHONE ENCOUNTER
As per Yoselyn Hopkins from [facility] patient will not try to get a dash as she doesn't want to take insulin, papers are scanned in chart

## 2021-08-13 ENCOUNTER — OFFICE VISIT (OUTPATIENT)
Dept: ENDOCRINOLOGY | Facility: CLINIC | Age: 46
End: 2021-08-13
Payer: COMMERCIAL

## 2021-08-13 VITALS
BODY MASS INDEX: 44.41 KG/M2 | HEART RATE: 91 BPM | HEIGHT: 68 IN | WEIGHT: 293 LBS | SYSTOLIC BLOOD PRESSURE: 128 MMHG | DIASTOLIC BLOOD PRESSURE: 64 MMHG

## 2021-08-13 DIAGNOSIS — I10 MILD ESSENTIAL HYPERTENSION: ICD-10-CM

## 2021-08-13 DIAGNOSIS — E66.01 CLASS 3 SEVERE OBESITY DUE TO EXCESS CALORIES WITH SERIOUS COMORBIDITY AND BODY MASS INDEX (BMI) OF 40.0 TO 44.9 IN ADULT (HCC): ICD-10-CM

## 2021-08-13 DIAGNOSIS — E11.65 TYPE 2 DIABETES MELLITUS WITH HYPERGLYCEMIA, WITHOUT LONG-TERM CURRENT USE OF INSULIN (HCC): Primary | ICD-10-CM

## 2021-08-13 DIAGNOSIS — E78.5 HYPERLIPIDEMIA, UNSPECIFIED HYPERLIPIDEMIA TYPE: ICD-10-CM

## 2021-08-13 PROCEDURE — 99214 OFFICE O/P EST MOD 30 MIN: CPT | Performed by: NURSE PRACTITIONER

## 2021-08-13 PROCEDURE — 1036F TOBACCO NON-USER: CPT | Performed by: NURSE PRACTITIONER

## 2021-08-13 PROCEDURE — 3008F BODY MASS INDEX DOCD: CPT | Performed by: NURSE PRACTITIONER

## 2021-08-13 RX ORDER — BLOOD SUGAR DIAGNOSTIC
1 STRIP MISCELLANEOUS 2 TIMES DAILY
Qty: 60 EACH | Refills: 6 | Status: SHIPPED | OUTPATIENT
Start: 2021-08-13

## 2021-08-13 RX ORDER — INSULIN GLARGINE 300 U/ML
10 INJECTION, SOLUTION SUBCUTANEOUS DAILY
Qty: 5 ML | Refills: 6 | Status: SHIPPED | OUTPATIENT
Start: 2021-08-13 | End: 2021-09-14 | Stop reason: SDUPTHER

## 2021-08-13 RX ORDER — BLOOD SUGAR DIAGNOSTIC
1 STRIP MISCELLANEOUS 2 TIMES DAILY
Qty: 60 EACH | Refills: 6 | Status: SHIPPED | OUTPATIENT
Start: 2021-08-13 | End: 2021-08-13 | Stop reason: SDUPTHER

## 2021-08-13 NOTE — ASSESSMENT & PLAN NOTE
Above goal with morning fasting hyperglycemia  Discussed starting basal insulin  She is agreeable to this  As she is new to insulin will start Toujeo 10 units before bed  Continue Trulicity and Metformin  Check BG 3-4x per day and send in BG log in 1-2 weeks for review  Based on results will need increase in dose or may need to be started on premeal insulin as well  Complete labs as ordered   Stressed importance of completing diabetic eye exam

## 2021-08-13 NOTE — PROGRESS NOTES
Established Patient Progress Note      Chief Complaint   Patient presents with    Diabetes Type 2          History of Present Illness:   Nicolasa Prince is a 55 y o  female with a history of HTN, HLD, and type 2 diabetes without long term use of insulin  Reports no complications of diabetes  Last A1C 9 3  She did not bring in BG log or meter for review  Reports morning fasting mostly 200s  Checks occasionally later in day and will be 150-200s  Denies recent illness or hospitalizations  Denies recent severe hypoglycemic or severe hyperglycemic episodes  Denies any issues with her current regimen  Home glucose monitoring: are performed regularly      Current regimen: Metformin 8,323 mg BID and Trulicity 3 mg   compliant all of the timedenies any side effects from current medications     Hypoglycemic episodes: No never   H/o of hypoglycemia causing hospitalization or Intervention such as glucagon injection or ambulance call No     Last Eye Exam: 1/26/19, no retinopathy   Last Foot Exam: 11/17/20    Has hypertension: Taking Lisinopril   Has hyperlipidemia: Taking Atorvastatin     Patient Active Problem List   Diagnosis    Type 2 diabetes mellitus with hyperglycemia, without long-term current use of insulin (HCC)    Hyperlipidemia    Mild essential hypertension    Elevated TSH    Right wrist tendonitis    Class 3 severe obesity due to excess calories with serious comorbidity and body mass index (BMI) of 40 0 to 44 9 in adult [facility])    Primary osteoarthritis of left knee    Migraines      Past Medical History:   Diagnosis Date    Arthritis     Benign essential hypertension     Resolved 8/16/2016     Diabetes mellitus ([address] )     Diabetes mellitus type 2, controlled ([address] )     Dyspnea and respiratory abnormality     Resolved 2/9/2015     Headache(784 0)     Migraine     Varicella     Vertigo       Past Surgical History:   Procedure Laterality Date    HAND SURGERY  09/2014    KNEE ARTHROPLASTY Right     WISBONNIE TOOTH EXTRACTION        Family History   Problem Relation Age of Onset    Diabetes unspecified Mother     Diabetes Mother     Lupus Mother     Diabetes type II Mother     Cancer Mother     Diabetes unspecified Sister     Diabetes unspecified Paternal Grandfather     Arrhythmia Paternal Grandfather     Heart attack Paternal Grandfather     Heart disease Paternal Grandfather     Diabetes Sister     Cancer Paternal Grandmother     Breast cancer Paternal Grandmother         49-58s    Heart murmur Father     Other Father         Right leg amputation     Cancer Maternal Grandmother         Skin, also maybe breast     No Known Problems Maternal Grandfather      Social History     Tobacco Use    Smoking status: Never Smoker    Smokeless tobacco: Never Used   Substance Use Topics    Alcohol use:  Yes     Alcohol/week: 0 0 standard drinks     Comment: Once in a blue moon     Allergies   Allergen Reactions    Hydrocodone-Acetaminophen      Vicodin--Other reaction(s): BREATHING WAS FUNNY  Tingling sensation in mouth         Current Outpatient Medications:     atorvastatin (LIPITOR) 10 mg tablet, Take 1 tablet (10 mg total) by mouth daily, Disp: 90 tablet, Rfl: 1    Calixto Microlet Lancets lancets, Use 2 (two) times a day Use as instructed, Disp: 60 each, Rfl: 6    Blood Glucose Monitoring Suppl (Contour Next One) KIT, Use 2 (two) times a day, Disp: 1 kit, Rfl: 0    Dulaglutide (Trulicity) 3 ZY/4 8NH SOPN, Inject 0 5 mL (3 mg total) under the skin once a week, Disp: 2 mL, Rfl: 5    glucose blood (Contour Next Test) test strip, Use 1 each 2 (two) times a day Use as instructed, Disp: 60 each, Rfl: 6    lisinopril (ZESTRIL) 10 mg tablet, Take 1 tablet (10 mg total) by mouth daily For blood pressure, Disp: 90 tablet, Rfl: 1    meloxicam (MOBIC) 15 mg tablet, Take 1 tablet (15 mg total) by mouth daily (Patient taking differently: Take 15 mg by mouth daily as needed for moderate pain ), Disp: 30 tablet, Rfl: 0    metFORMIN (GLUCOPHAGE) 1000 MG tablet, Take 1 tablet (1,000 mg total) by mouth 2 (two) times a day with meals, Disp: 180 tablet, Rfl: 3    naproxen (NAPROSYN) 500 mg tablet, Take 1 tablet by mouth daily as needed for moderate pain , Disp: , Rfl:     insulin glargine (Toujeo SoloStar) 300 units/mL CONCENTRATED U-300 injection pen (1-unit dial), Inject 10 Units under the skin daily, Disp: 5 mL, Rfl: 6    Insulin Pen Needle 32G X 4 MM MISC, Use daily, Disp: 100 each, Rfl: 6    meclizine (ANTIVERT) 12 5 MG tablet, Take 1 tablet (12 5 mg total) by mouth every 8 (eight) hours as needed for dizziness, Disp: 30 tablet, Rfl: 0    Review of Systems   Constitutional: Negative for chills and fever  HENT: Negative for sore throat and trouble swallowing  Eyes: Negative for visual disturbance  Respiratory: Negative for shortness of breath  Cardiovascular: Negative for chest pain and palpitations  Gastrointestinal: Negative for abdominal pain, constipation and diarrhea  Endocrine: Negative for cold intolerance, heat intolerance, polydipsia, polyphagia and polyuria  Genitourinary: Negative for frequency  Musculoskeletal: Negative for arthralgias and myalgias  Skin: Negative for rash  Neurological: Negative for dizziness and tremors  Hematological: Negative for adenopathy  Psychiatric/Behavioral: Negative for sleep disturbance  All other systems reviewed and are negative  Physical Exam:  Body mass index is 44 55 kg/m²  /64   Pulse 91   Ht 5' 8" (1 727 m)   Wt 133 kg (293 lb)   BMI 44 55 kg/m²    Wt Readings from Last 3 Encounters:   08/13/21 133 kg (293 lb)   08/10/21 132 kg (290 lb)   06/29/21 133 kg (293 lb 6 4 oz)       Physical Exam  Vitals reviewed  Constitutional:       General: She is not in acute distress  Appearance: She is well-developed  HENT:      Head: Normocephalic and atraumatic     Eyes:      Conjunctiva/sclera: Conjunctivae normal       Pupils: Pupils are equal, round, and reactive to light  Neck:      Thyroid: No thyromegaly  Cardiovascular:      Rate and Rhythm: Normal rate and regular rhythm  Heart sounds: Normal heart sounds  Pulmonary:      Effort: Pulmonary effort is normal  No respiratory distress  Breath sounds: Normal breath sounds  No wheezing or rales  Abdominal:      General: Bowel sounds are normal  There is no distension  Palpations: Abdomen is soft  Tenderness: There is no abdominal tenderness  Musculoskeletal:         General: Normal range of motion  Cervical back: Normal range of motion and neck supple  Skin:     General: Skin is warm and dry  Neurological:      Mental Status: She is alert and oriented to person, place, and time  Labs:     Lab Results   Component Value Date    HGBA1C 9 3 (H) 05/22/2021       Lab Results   Component Value Date    SODIUM 133 (L) 05/22/2021    K 4 6 05/22/2021     05/22/2021    CO2 22 05/22/2021    BUN 11 05/22/2021    CREATININE 0 69 05/22/2021    GLUC 276 (H) 05/22/2021    CALCIUM 9 0 05/22/2021    AST 16 05/22/2021    ALT 23 05/22/2021    ALKPHOS 71 05/22/2021    TP 6 3 05/22/2021    TBILI 0 6 05/22/2021         Lab Results   Component Value Date    HDL 36 (L) 05/22/2021    TRIG 130 05/22/2021         Impression & Plan:    Problem List Items Addressed This Visit        Endocrine    Type 2 diabetes mellitus with hyperglycemia, without long-term current use of insulin ([facility] [address] ) - Primary     Above goal with morning fasting hyperglycemia  Discussed starting basal insulin  She is agreeable to this  As she is new to insulin will start Toujeo 10 units before bed  Continue Trulicity and Metformin  Check BG 3-4x per day and send in BG log in 1-2 weeks for review  Based on results will need increase in dose or may need to be started on premeal insulin as well  Complete labs as ordered   Stressed importance of completing diabetic eye exam          Relevant Medications insulin glargine (Toujeo SoloStar) 300 units/mL CONCENTRATED U-300 injection pen (1-unit dial)    Insulin Pen Needle 32G X 4 MM MISC    glucose blood (Contour Next Test) test strip    metFORMIN (GLUCOPHAGE) 1000 MG tablet    Other Relevant Orders    Hemoglobin A1C    Comprehensive metabolic panel    Lipid Panel with Direct LDL reflex    Microalbumin / creatinine urine ratio    Hemoglobin A1C    Comprehensive metabolic panel    Lipid Panel with Direct LDL reflex       Cardiovascular and Mediastinum    Mild essential hypertension     BP stable  , continue current regimen          Relevant Orders    Comprehensive metabolic panel    Comprehensive metabolic panel       Other    Hyperlipidemia     Continue statin          Relevant Orders    Lipid Panel with Direct LDL reflex    Lipid Panel with Direct LDL reflex    Class 3 severe obesity due to excess calories with serious comorbidity and body mass index (BMI) of 40 0 to 44 9 in adult [facility])          Orders Placed This Encounter   Procedures    Hemoglobin A1C    Comprehensive metabolic panel     This is a patient instruction: Patient fasting for 8 hours or longer recommended   Lipid Panel with Direct LDL reflex     This is a patient instruction: This test requires patient fasting for 10-12 hours or longer  Drinking of black coffee or black tea is acceptable   Microalbumin / creatinine urine ratio    Hemoglobin A1C     Standing Status:   Future     Standing Expiration Date:   8/13/2022    Comprehensive metabolic panel     This is a patient instruction: Patient fasting for 8 hours or longer recommended  Standing Status:   Future     Standing Expiration Date:   8/13/2022    Lipid Panel with Direct LDL reflex     This is a patient instruction: This test requires patient fasting for 10-12 hours or longer  Drinking of black coffee or black tea is acceptable       Standing Status:   Future     Standing Expiration Date:   8/13/2022       Discussed with the patient and all questioned fully answered  She will call me if any problems arise  Follow-up appointment in 3 months       Counseled patient on diagnostic results, prognosis, risk and benefit of treatment options, instruction for management, importance of treatment compliance, Risk  factor reduction and impressions      Fabio Loera [address]

## 2021-08-17 NOTE — TELEPHONE ENCOUNTER
Spoke to Dominguez from [facility] pt is only taking trulicity once a week and doesn't check blood sugars 4 times daily so she isnt eligible for [facility] yet

## 2021-08-18 ENCOUNTER — PATIENT MESSAGE (OUTPATIENT)
Dept: FAMILY MEDICINE CLINIC | Facility: CLINIC | Age: 46
End: 2021-08-18

## 2021-08-18 ENCOUNTER — APPOINTMENT (OUTPATIENT)
Dept: RADIOLOGY | Age: 46
End: 2021-08-18
Payer: COMMERCIAL

## 2021-08-18 ENCOUNTER — OFFICE VISIT (OUTPATIENT)
Dept: URGENT CARE | Age: 46
End: 2021-08-18
Payer: COMMERCIAL

## 2021-08-18 VITALS
TEMPERATURE: 98 F | WEIGHT: 293 LBS | SYSTOLIC BLOOD PRESSURE: 134 MMHG | RESPIRATION RATE: 18 BRPM | HEART RATE: 92 BPM | BODY MASS INDEX: 44.41 KG/M2 | DIASTOLIC BLOOD PRESSURE: 88 MMHG | HEIGHT: 68 IN

## 2021-08-18 DIAGNOSIS — T14.90XA INJURY: ICD-10-CM

## 2021-08-18 DIAGNOSIS — M25.531 RIGHT WRIST PAIN: Primary | ICD-10-CM

## 2021-08-18 PROCEDURE — 73110 X-RAY EXAM OF WRIST: CPT

## 2021-08-18 PROCEDURE — G0382 LEV 3 HOSP TYPE B ED VISIT: HCPCS | Performed by: PHYSICIAN ASSISTANT

## 2021-08-18 NOTE — PROGRESS NOTES
[facility]        NAME: Olvin Gonzalez is a 55 y o  female  : 1975    MRN: [id number]  DATE: 2021  TIME: 6:18 PM    Assessment and Plan   Right wrist pain [M25 531]  1  Right wrist pain  XR wrist 3+ vw right    Ambulatory referral to Orthopedic Surgery     Xray- negative for obvious acute osseous abnormality, pending final read  premade wrist Splint- placed by medical staff for comfort, NV intact post-splinting    Patient Instructions     RICE- rest, ice, compression, elevation  Wrist splint while awake for comfort  Call tomorrow for official xray results  Call Ortho today and follow-up with them in the next 1-2 days for further evaluation and treatment  Call Gissell Correa to schedule an appointment: [phone]  Or the direct Ortho number at [phone] to schedule an appointment   Go to the ER immediately if any numbness, tingling, weakness, change in intensity or location of pain, arm pain or swelling, other new or concerning symptoms    Chief Complaint     Chief Complaint   Patient presents with    Wrist Pain     yesterday she was taking something apart and hyper flexed her right wrist now having discomfort driving , any pressure applied to the area         History of Present Illness         19-year-old female presents with right wrist pain since last night  States she bent her wrist wrong yesterday and has been having pain with movements since  States pain resolves at rest   States she has tried ice, and over-the-counter pain medications with some relief  She states she does have a history of tendinitis in that wrist and has also been trying her wrist exercises  She denies any radiation of pain  She denies any hand or finger pain  Denies any swelling, bruising, redness, warmth or other abnormalities  Denies any numbness, tingling, weakness or other complaints  She is right-hand dominant   She denies any pregnancy risk      Review of Systems   Review of Systems Constitutional: Negative for chills, fatigue and fever  Respiratory: Negative for shortness of breath  Cardiovascular: Negative for chest pain  Gastrointestinal: Negative for abdominal pain, diarrhea, nausea and vomiting  Musculoskeletal: Positive for arthralgias  Negative for back pain, joint swelling and neck pain  Skin: Negative for rash and wound  Neurological: Negative for dizziness, weakness, numbness and headaches  All other systems reviewed and are negative          Current Medications       Current Outpatient Medications:     atorvastatin (LIPITOR) 10 mg tablet, Take 1 tablet (10 mg total) by mouth daily, Disp: 90 tablet, Rfl: 1    Calixto Microlet Lancets lancets, Use 2 (two) times a day Use as instructed, Disp: 60 each, Rfl: 6    Blood Glucose Monitoring Suppl (Contour Next One) KIT, Use 2 (two) times a day, Disp: 1 kit, Rfl: 0    Dulaglutide (Trulicity) 3 JQ/6 5KO SOPN, Inject 0 5 mL (3 mg total) under the skin once a week, Disp: 2 mL, Rfl: 5    glucose blood (Contour Next Test) test strip, Use 1 each 2 (two) times a day Use as instructed, Disp: 60 each, Rfl: 6    insulin glargine (Toujeo SoloStar) 300 units/mL CONCENTRATED U-300 injection pen (1-unit dial), Inject 10 Units under the skin daily, Disp: 5 mL, Rfl: 6    Insulin Pen Needle 32G X 4 MM MISC, Use daily, Disp: 100 each, Rfl: 6    lisinopril (ZESTRIL) 10 mg tablet, Take 1 tablet (10 mg total) by mouth daily For blood pressure, Disp: 90 tablet, Rfl: 1    meclizine (ANTIVERT) 12 5 MG tablet, Take 1 tablet (12 5 mg total) by mouth every 8 (eight) hours as needed for dizziness, Disp: 30 tablet, Rfl: 0    meloxicam (MOBIC) 15 mg tablet, Take 1 tablet (15 mg total) by mouth daily (Patient taking differently: Take 15 mg by mouth daily as needed for moderate pain ), Disp: 30 tablet, Rfl: 0    metFORMIN (GLUCOPHAGE) 1000 MG tablet, Take 1 tablet (1,000 mg total) by mouth 2 (two) times a day with meals, Disp: 180 tablet, Rfl: 3   naproxen (NAPROSYN) 500 mg tablet, Take 1 tablet by mouth daily as needed for moderate pain , Disp: , Rfl:     Current Allergies     Allergies as of 08/18/2021 - Reviewed 08/18/2021   Allergen Reaction Noted    Hydrocodone-acetaminophen  04/29/2015            The following portions of the patient's history were reviewed and updated as appropriate: allergies, current medications, past family history, past medical history, past social history, past surgical history and problem list      Past Medical History:   Diagnosis Date    Arthritis     Benign essential hypertension     Resolved 8/16/2016     Diabetes mellitus ([facility] [address] )     Diabetes mellitus type 2, controlled ([facility] [address] )     Dyspnea and respiratory abnormality     Resolved 2/9/2015     Headache(784 0)     Migraine     Varicella     Vertigo        Past Surgical History:   Procedure Laterality Date    HAND SURGERY  09/2014    KNEE ARTHROPLASTY Right     WISDOM TOOTH EXTRACTION         Family History   Problem Relation Age of Onset    Diabetes unspecified Mother     Diabetes Mother     Lupus Mother     Diabetes type II Mother     Cancer Mother     Diabetes unspecified Sister     Diabetes unspecified Paternal Grandfather     Arrhythmia Paternal Grandfather     Heart attack Paternal Grandfather     Heart disease Paternal Grandfather     Diabetes Sister     Cancer Paternal Grandmother     Breast cancer Paternal Grandmother         49-58s    Heart murmur Father     Other Father         Right leg amputation     Cancer Maternal Grandmother         Skin, also maybe breast     No Known Problems Maternal Grandfather          Medications have been verified          Objective   /88 (BP Location: Left arm, Patient Position: Sitting, Cuff Size: Standard)   Pulse 92   Temp 98 °F (36 7 °C) (Temporal)   Resp 18   Ht 5' 8" (1 727 m)   Wt 133 kg (293 lb)   LMP 07/31/2021   BMI 44 55 kg/m²        Physical Exam     Physical Exam  Vitals and nursing note reviewed  Constitutional:       General: She is not in acute distress  Appearance: Normal appearance  She is well-developed  She is not toxic-appearing  Cardiovascular:      Rate and Rhythm: Normal rate and regular rhythm  Heart sounds: Normal heart sounds  Pulmonary:      Effort: Pulmonary effort is normal       Breath sounds: Normal breath sounds  Musculoskeletal:      Right forearm: Normal       Right wrist: No swelling, deformity, tenderness, bony tenderness or snuff box tenderness  Normal range of motion ( but mild pain with flexion)  Normal pulse  Right hand: Normal  No swelling or tenderness  Normal range of motion  Normal strength  Normal sensation  Normal capillary refill  Normal pulse  Comments: DIP/PIP flexion tendons intact  Full extension of the fingers  5/5  strength  Skin:     Capillary Refill: Capillary refill takes less than 2 seconds  Neurological:      General: No focal deficit present  Mental Status: She is alert and oriented to person, place, and time  Sensory: Sensation is intact  Motor: Motor function is intact  Deep Tendon Reflexes: Reflexes are normal and symmetric  Comments: NV intact with sensation and strong peripheral pulses   5/5 strength of UE bilaterally   Psychiatric:         Behavior: Behavior normal

## 2021-08-18 NOTE — PATIENT INSTRUCTIONS
RICE- rest, ice, compression, elevation  Wrist splint while awake for comfort  Call tomorrow for official xray results  Call Ortho today and follow-up with them in the next 1-2 days for further evaluation and treatment     Call Gissell Correa to schedule an appointment: [phone]  Or the direct Ortho number at [phone] to schedule an appointment   Go to the ER immediately if any numbness, tingling, weakness, change in intensity or location of pain, arm pain or swelling, other new or concerning symptoms

## 2021-08-19 NOTE — PROGRESS NOTES
8/19/21: The patient has no additional appointments scheduled and has not attended PT in almost two weeks   Patient is discharged from PT

## 2021-08-24 DIAGNOSIS — E11.65 TYPE 2 DIABETES MELLITUS WITH HYPERGLYCEMIA, WITHOUT LONG-TERM CURRENT USE OF INSULIN (HCC): ICD-10-CM

## 2021-08-24 NOTE — TELEPHONE ENCOUNTER
For some reason, the metformin prescription went to [facility] instead of [facility]   Can we get this corrected?     Thanks,     [employer]

## 2021-08-25 ENCOUNTER — PATIENT MESSAGE (OUTPATIENT)
Dept: ENDOCRINOLOGY | Facility: CLINIC | Age: 46
End: 2021-08-25

## 2021-08-25 DIAGNOSIS — E11.65 TYPE 2 DIABETES MELLITUS WITH HYPERGLYCEMIA, WITHOUT LONG-TERM CURRENT USE OF INSULIN (HCC): ICD-10-CM

## 2021-08-25 RX ORDER — DULAGLUTIDE 3 MG/.5ML
3 INJECTION, SOLUTION SUBCUTANEOUS WEEKLY
Qty: 2 ML | Refills: 5 | Status: SHIPPED | OUTPATIENT
Start: 2021-08-25 | End: 2022-01-03

## 2021-08-25 NOTE — TELEPHONE ENCOUNTER
From: Bernie Garduno  To: MICHEAL Ballard  Sent: 8/25/2021 7:04 AM EDT  Subject: Prescription Question    Did you send a new prescription to [address] [place] [address] for Trulicity? They are still saying it is denied  Please correct so I can get my next shipment with Pill pack

## 2021-08-30 ENCOUNTER — PATIENT MESSAGE (OUTPATIENT)
Dept: FAMILY MEDICINE CLINIC | Facility: CLINIC | Age: 46
End: 2021-08-30

## 2021-08-31 NOTE — TELEPHONE ENCOUNTER
From: Shelia Chavez  To: Omar Snell MD  Sent: 8/30/2021 7:14 PM EDT  Subject: Non-Urgent Medical Question    Do you recommend that I get the booster for covid?

## 2021-11-05 DIAGNOSIS — E11.65 TYPE 2 DIABETES MELLITUS WITH HYPERGLYCEMIA, WITHOUT LONG-TERM CURRENT USE OF INSULIN (HCC): ICD-10-CM

## 2021-11-05 RX ORDER — LANCETS
EACH MISCELLANEOUS
Qty: 100 EACH | Refills: 2 | Status: SHIPPED | OUTPATIENT
Start: 2021-11-05

## 2021-12-22 ENCOUNTER — TELEPHONE (OUTPATIENT)
Dept: FAMILY MEDICINE CLINIC | Facility: CLINIC | Age: 46
End: 2021-12-22

## 2022-01-02 DIAGNOSIS — E11.65 TYPE 2 DIABETES MELLITUS WITH HYPERGLYCEMIA, WITHOUT LONG-TERM CURRENT USE OF INSULIN (HCC): ICD-10-CM

## 2022-01-03 RX ORDER — DULAGLUTIDE 3 MG/.5ML
INJECTION, SOLUTION SUBCUTANEOUS
Qty: 2 ML | Refills: 4 | Status: SHIPPED | OUTPATIENT
Start: 2022-01-03 | End: 2022-05-27

## 2022-01-19 ENCOUNTER — TELEPHONE (OUTPATIENT)
Dept: FAMILY MEDICINE CLINIC | Facility: CLINIC | Age: 47
End: 2022-01-19

## 2022-01-19 NOTE — LETTER
[address] [address]      Dear Franky Ko,     [facility] records indicate that you are overdue for your diabetic eye exam  If you've seen your eye doctor freeman then, please let us know so we can request the records  If not, please call your eye doctor to schedule a routine diabetic eye exam and request they send us the report after your visit       Thank you,       [address]

## 2022-01-20 ENCOUNTER — TELEPHONE (OUTPATIENT)
Dept: ADMINISTRATIVE | Facility: OTHER | Age: 47
End: 2022-01-20

## 2022-01-20 NOTE — TELEPHONE ENCOUNTER
Upon review of the In Basket request we were able to locate, review, and update the patient chart as requested for Mammogram     Any additional questions or concerns should be emailed to the Practice Liaisons via [email or web address] email, please do not reply via In Basket      Thank you  Rosa Clark

## 2022-01-20 NOTE — TELEPHONE ENCOUNTER
----- Message from Alison Ledezma sent at 1/19/2022  2:08 PM EST -----  Regarding: LAINA MARX  01/19/22 2:09 PM    Hello, our patient Tyrone Thompson has had Mammogram completed/performed  Please assist in updating the patient chart by pulling a previous Electronic Medical Record (EMR) document  The previous EMR is CE  The date of service is 1/26/21      Thank you,  Ezequiel Spence MA  [facility]  Terry Grajeda

## 2022-01-27 DIAGNOSIS — E78.5 HYPERLIPIDEMIA, UNSPECIFIED HYPERLIPIDEMIA TYPE: ICD-10-CM

## 2022-01-27 DIAGNOSIS — I10 MILD ESSENTIAL HYPERTENSION: ICD-10-CM

## 2022-01-27 RX ORDER — LISINOPRIL 10 MG/1
TABLET ORAL
Qty: 90 TABLET | Refills: 0 | OUTPATIENT
Start: 2022-01-27

## 2022-01-27 RX ORDER — ATORVASTATIN CALCIUM 10 MG/1
TABLET, FILM COATED ORAL
Qty: 90 TABLET | Refills: 0 | OUTPATIENT
Start: 2022-01-27

## 2022-01-31 ENCOUNTER — TELEPHONE (OUTPATIENT)
Dept: FAMILY MEDICINE CLINIC | Facility: CLINIC | Age: 47
End: 2022-01-31

## 2022-02-07 DIAGNOSIS — I10 MILD ESSENTIAL HYPERTENSION: ICD-10-CM

## 2022-02-07 DIAGNOSIS — E78.5 HYPERLIPIDEMIA, UNSPECIFIED HYPERLIPIDEMIA TYPE: ICD-10-CM

## 2022-02-07 PROCEDURE — 4010F ACE/ARB THERAPY RXD/TAKEN: CPT | Performed by: PHYSICIAN ASSISTANT

## 2022-02-07 RX ORDER — LISINOPRIL 10 MG/1
10 TABLET ORAL DAILY
Qty: 90 TABLET | Refills: 1 | Status: SHIPPED | OUTPATIENT
Start: 2022-02-07 | End: 2022-08-03 | Stop reason: SDUPTHER

## 2022-02-07 RX ORDER — ATORVASTATIN CALCIUM 10 MG/1
10 TABLET, FILM COATED ORAL DAILY
Qty: 90 TABLET | Refills: 1 | Status: SHIPPED | OUTPATIENT
Start: 2022-02-07 | End: 2022-08-03 | Stop reason: SDUPTHER

## 2022-02-09 ENCOUNTER — TELEPHONE (OUTPATIENT)
Dept: FAMILY MEDICINE CLINIC | Facility: CLINIC | Age: 47
End: 2022-02-09

## 2022-02-17 DIAGNOSIS — E78.5 HYPERLIPIDEMIA, UNSPECIFIED HYPERLIPIDEMIA TYPE: ICD-10-CM

## 2022-02-17 DIAGNOSIS — E11.65 TYPE 2 DIABETES MELLITUS WITH HYPERGLYCEMIA, WITHOUT LONG-TERM CURRENT USE OF INSULIN (HCC): Primary | ICD-10-CM

## 2022-02-22 ENCOUNTER — OFFICE VISIT (OUTPATIENT)
Dept: OBGYN CLINIC | Facility: CLINIC | Age: 47
End: 2022-02-22
Payer: COMMERCIAL

## 2022-02-22 VITALS
WEIGHT: 293 LBS | BODY MASS INDEX: 44.41 KG/M2 | SYSTOLIC BLOOD PRESSURE: 122 MMHG | HEART RATE: 71 BPM | HEIGHT: 68 IN | DIASTOLIC BLOOD PRESSURE: 79 MMHG

## 2022-02-22 DIAGNOSIS — S63.632A SPRAIN OF INTERPHALANGEAL JOINT OF RIGHT MIDDLE FINGER, INITIAL ENCOUNTER: Primary | ICD-10-CM

## 2022-02-22 PROCEDURE — 3074F SYST BP LT 130 MM HG: CPT | Performed by: PHYSICIAN ASSISTANT

## 2022-02-22 PROCEDURE — 99213 OFFICE O/P EST LOW 20 MIN: CPT | Performed by: PHYSICIAN ASSISTANT

## 2022-02-22 PROCEDURE — 1036F TOBACCO NON-USER: CPT | Performed by: PHYSICIAN ASSISTANT

## 2022-02-22 PROCEDURE — 3008F BODY MASS INDEX DOCD: CPT | Performed by: PHYSICIAN ASSISTANT

## 2022-02-22 PROCEDURE — 3078F DIAST BP <80 MM HG: CPT | Performed by: PHYSICIAN ASSISTANT

## 2022-02-22 NOTE — PROGRESS NOTES
CHIEF COMPLAINT:  Chief Complaint   Patient presents with    Right Hand - Pain       SUBJECTIVE:  Yesenia Pride is a 55y o  year old female who presents right hand pain  Patient states that she had an injury when she jammed her finger in the middle of January  She states that she had significant swelling over the PIP joint of the long finger  [facility] which demonstrate no acute fractures  She previously had surgical intervention in 2013 with Dr Isael Lizama for the same finger at the MCP joint  She is not sure as to what the surgery entailed and there is no record in EMR in regards to surgical procedure  She states that the swelling has gone down and she will still notes some discomfort from time to time  She is able to make a full fist   She denies any numbness or tingling        PAST MEDICAL HISTORY:  Past Medical History:   Diagnosis Date    Arthritis     Benign essential hypertension     Resolved 8/16/2016     Diabetes mellitus ([address] )     Diabetes mellitus type 2, controlled ([address] )     Dyspnea and respiratory abnormality     Resolved 2/9/2015     Headache(784 0)     Migraine     Varicella     Vertigo        PAST SURGICAL HISTORY:  Past Surgical History:   Procedure Laterality Date    HAND SURGERY  09/2014    KNEE ARTHROPLASTY Right     WISDOM TOOTH EXTRACTION         FAMILY HISTORY:  Family History   Problem Relation Age of Onset    Diabetes unspecified Mother     Diabetes Mother     Lupus Mother     Diabetes type II Mother     Cancer Mother     Diabetes unspecified Sister     Diabetes unspecified Paternal Grandfather     Arrhythmia Paternal Grandfather     Heart attack Paternal Grandfather     Heart disease Paternal Grandfather     Diabetes Sister     Cancer Paternal Grandmother     Breast cancer Paternal Grandmother         49-58s    Heart murmur Father     Other Father         Right leg amputation     Cancer Maternal Grandmother         Skin, also maybe breast     No Known Problems Maternal Grandfather        SOCIAL HISTORY:  Social History     Tobacco Use    Smoking status: Never Smoker    Smokeless tobacco: Never Used   Vaping Use    Vaping Use: Never used   Substance Use Topics    Alcohol use: Yes     Alcohol/week: 0 0 standard drinks     Comment: Once in a blue moon    Drug use: No       MEDICATIONS:    Current Outpatient Medications:     atorvastatin (LIPITOR) 10 mg tablet, Take 1 tablet (10 mg total) by mouth daily, Disp: 90 tablet, Rfl: 1    Blood Glucose Monitoring Suppl (Contour Next One) KIT, Use 2 (two) times a day, Disp: 1 kit, Rfl: 0    Continuous Blood Gluc  (Dexcom G6 ) BLANCHE, Use as directed, Disp: 1 each, Rfl: 0    Continuous Blood Gluc Sensor (Dexcom G6 Sensor) MISC, Change every 10 days, Disp: 3 each, Rfl: 11    Continuous Blood Gluc Transmit (Dexcom G6 Transmitter) MISC, Change every 90 days, Disp: 3 each, Rfl: 3    glucose blood (Contour Next Test) test strip, Use 1 each 2 (two) times a day Use as instructed, Disp: 60 each, Rfl: 6    insulin glargine (Toujeo SoloStar) 300 units/mL CONCENTRATED U-300 injection pen (1-unit dial), Inject 12 Units under the skin daily, Disp: 5 mL, Rfl: 6    Insulin Pen Needle 32G X 4 MM MISC, Use daily, Disp: 100 each, Rfl: 6    lisinopril (ZESTRIL) 10 mg tablet, Take 1 tablet (10 mg total) by mouth daily For blood pressure, Disp: 90 tablet, Rfl: 1    meclizine (ANTIVERT) 12 5 MG tablet, Take 1 tablet (12 5 mg total) by mouth every 8 (eight) hours as needed for dizziness, Disp: 30 tablet, Rfl: 0    meloxicam (MOBIC) 15 mg tablet, Take 1 tablet (15 mg total) by mouth daily (Patient taking differently: Take 15 mg by mouth daily as needed for moderate pain ), Disp: 30 tablet, Rfl: 0    metFORMIN (GLUCOPHAGE) 1000 MG tablet, Take 1 tablet (1,000 mg total) by mouth 2 (two) times a day with meals, Disp: 180 tablet, Rfl: 3    Microlet Lancets MISC, Test blood glucose twice daily  , Disp: 100 each, Rfl: 2    naproxen (NAPROSYN) 500 mg tablet, Take 1 tablet by mouth daily as needed for moderate pain , Disp: , Rfl:     Trulicity 3 WZ/5 5WF SOPN, Inject 0 5 ml (3 mg total) subcutaneously once weekly  , Disp: 2 mL, Rfl: 4    ALLERGIES:  Allergies   Allergen Reactions    Hydrocodone-Acetaminophen      Vicodin--Other reaction(s): BREATHING WAS FUNNY  Tingling sensation in mouth       REVIEW OF SYSTEMS:  Review of Systems  ROS:   General: no fever, no chills  HEENT:  No loss of hearing or eyesight problems  Eyes:  No red eyes  Respiratory:  No coughing, shortness of breath or wheezing  Cardiovascular:  No chest pain, no palpitations  GI:  Abdomen soft nontender, denies nausea  Endocrine:  No muscle weakness, no frequent urination, no excessive thirst  Urinary:  No dysuria, no incontinence  Musculoskeletal: see HPI and PE  SKIN:  No skin rash, no dry skin  Neurological:  No headaches, no confusion  Psychiatric:  No suicide thoughts, no anxiety, no depression  Review of all other systems is negative    VITALS:  Vitals:    02/22/22 1500   BP: 122/79   Pulse: 71       LABS:  HgA1c:   Lab Results   Component Value Date    HGBA1C 9 0 09/14/2021     BMP:   Lab Results   Component Value Date    CALCIUM 9 0 05/22/2021    K 4 6 05/22/2021    CO2 22 05/22/2021     05/22/2021    BUN 11 05/22/2021    CREATININE 0 69 05/22/2021       _____________________________________________________  PHYSICAL EXAMINATION:  General: well developed and well nourished, alert, oriented times 3 and appears comfortable  Psychiatric: Normal  HEENT: Trachea Midline, No torticollis  Pulmonary: No audible wheezing or strider  Cardiovascular: No discernable arrhythmia   Skin: No masses, erythema, lacerations, fluctation, ulcerations  Neurovascular: Sensation Intact to the Median, Ulnar, Radial Nerve, Motor Intact to the Median, Ulnar, Radial Nerve and Pulses Intact    MUSCULOSKELETAL EXAMINATION:  Right long finger   No erythema edema or ecchymosis noted, skin is warm to touch   Mild tenderness to palpation over the PIP joint  She has full range of motion of the digit with flexion and extension   Strength is 5/5 for flexion and extension   PIP and D IP joint are stable with ligamentous testing  MCP joint is stable with ligamentous testing   Patient is neurovascular intact    ___________________________________________________  STUDIES REVIEWED:  X-rays were reviewed from outside source of the right hand long finger which demonstrate no acute fractures or dislocations  PROCEDURES PERFORMED:  Procedures  No Procedures performed today    _____________________________________________________  ASSESSMENT/PLAN:      Diagnoses and all orders for this visit:    Sprain of interphalangeal joint of right middle finger, initial encounter  · Patient was advised to buddy tape the long to the ring finger for support   · She can take Tylenol and anti-inflammatories as needed for pain   · She was advised that these can take 8-10 weeks for complete healing of a ligament sprain  · She will follow-up with us as needed      Follow Up:  Return if symptoms worsen or fail to improve  To Do Next Visit:  Re-evaluation of current issue            Portions of the record may have been created with voice recognition software  Occasional wrong word or "sound a like" substitutions may have occurred due to the inherent limitations of voice recognition software  Read the chart carefully and recognize, using context, where substitutions have occurred

## 2022-08-03 DIAGNOSIS — I10 MILD ESSENTIAL HYPERTENSION: ICD-10-CM

## 2022-08-03 DIAGNOSIS — E78.5 HYPERLIPIDEMIA, UNSPECIFIED HYPERLIPIDEMIA TYPE: ICD-10-CM

## 2022-08-03 NOTE — TELEPHONE ENCOUNTER
Patient is overdue for follow up here and with endocrine  She needs to make both appts and get labs done  I will send medication once patient has made an appointment- advise no further refills without an appointment for her annual PE and lab work done and remind her to call for endocrine appointment as well

## 2022-08-08 RX ORDER — LISINOPRIL 10 MG/1
10 TABLET ORAL DAILY
Qty: 90 TABLET | Refills: 0 | Status: SHIPPED | OUTPATIENT
Start: 2022-08-08

## 2022-08-08 RX ORDER — ATORVASTATIN CALCIUM 10 MG/1
10 TABLET, FILM COATED ORAL DAILY
Qty: 90 TABLET | Refills: 0 | Status: SHIPPED | OUTPATIENT
Start: 2022-08-08

## 2022-08-09 ENCOUNTER — TELEPHONE (OUTPATIENT)
Dept: ENDOCRINOLOGY | Facility: CLINIC | Age: 47
End: 2022-08-09

## 2022-08-09 NOTE — TELEPHONE ENCOUNTER
----- Message from Kaitlyn Davis sent at 8/9/2022  8:24 AM EDT -----  Regarding: FW: Prescription Refill Needed  Needs an appointment ASAP  She was last seen 8/31/21 and was told to come back in [place]   ----- Message -----  From: Siddharth Velazquez  Sent: 8/9/2022   4:55 AM EDT  To: , #  Subject: Prescription Refill Needed                       Metformin- needs to be approved so it can be included in my PillPack shipment

## 2022-08-26 DIAGNOSIS — E11.65 TYPE 2 DIABETES MELLITUS WITH HYPERGLYCEMIA, WITHOUT LONG-TERM CURRENT USE OF INSULIN (HCC): ICD-10-CM

## 2022-08-26 RX ORDER — PEN NEEDLE, DIABETIC 32GX 5/32"
NEEDLE, DISPOSABLE MISCELLANEOUS
Qty: 100 EACH | Refills: 6 | Status: SHIPPED | OUTPATIENT
Start: 2022-08-26 | End: 2022-08-29

## 2022-08-27 DIAGNOSIS — E11.65 TYPE 2 DIABETES MELLITUS WITH HYPERGLYCEMIA, WITHOUT LONG-TERM CURRENT USE OF INSULIN (HCC): ICD-10-CM

## 2022-08-29 RX ORDER — PEN NEEDLE, DIABETIC 32GX 5/32"
NEEDLE, DISPOSABLE MISCELLANEOUS
Qty: 100 EACH | Refills: 6 | Status: SHIPPED | OUTPATIENT
Start: 2022-08-29

## 2022-09-24 DIAGNOSIS — E11.65 TYPE 2 DIABETES MELLITUS WITH HYPERGLYCEMIA, WITHOUT LONG-TERM CURRENT USE OF INSULIN (HCC): ICD-10-CM

## 2022-09-27 DIAGNOSIS — E11.65 TYPE 2 DIABETES MELLITUS WITH HYPERGLYCEMIA, WITHOUT LONG-TERM CURRENT USE OF INSULIN (HCC): ICD-10-CM

## 2022-09-27 RX ORDER — DULAGLUTIDE 3 MG/.5ML
INJECTION, SOLUTION SUBCUTANEOUS
Qty: 2 ML | Refills: 3 | Status: CANCELLED | OUTPATIENT
Start: 2022-09-27

## 2022-09-27 RX ORDER — DULAGLUTIDE 3 MG/.5ML
INJECTION, SOLUTION SUBCUTANEOUS
Qty: 2 ML | Refills: 2 | Status: SHIPPED | OUTPATIENT
Start: 2022-09-27

## 2022-09-27 NOTE — TELEPHONE ENCOUNTER
Patient requesting Trulicity refill> PT has not been seen in office since August of 2021  Notified to make an appointment or contact PCP

## 2022-09-28 DIAGNOSIS — E11.65 TYPE 2 DIABETES MELLITUS WITH HYPERGLYCEMIA, WITHOUT LONG-TERM CURRENT USE OF INSULIN (HCC): ICD-10-CM

## 2022-09-28 RX ORDER — INSULIN GLARGINE 300 U/ML
12 INJECTION, SOLUTION SUBCUTANEOUS DAILY
Qty: 5 ML | Refills: 3 | Status: SHIPPED | OUTPATIENT
Start: 2022-09-28

## 2022-09-28 NOTE — TELEPHONE ENCOUNTER
Patient called for refill    She has appt scheduled in [facility] with Mayra Miller on 12/14/2022

## 2022-10-17 ENCOUNTER — TELEPHONE (OUTPATIENT)
Dept: ENDOCRINOLOGY | Facility: CLINIC | Age: 47
End: 2022-10-17

## 2022-10-17 NOTE — TELEPHONE ENCOUNTER
Received a RX request from [facility], they are requesting a refill for Pt's Metformin , [facility], Call pt left message to please call us back  To verify this request

## 2022-10-20 DIAGNOSIS — E11.65 TYPE 2 DIABETES MELLITUS WITH HYPERGLYCEMIA, WITHOUT LONG-TERM CURRENT USE OF INSULIN (HCC): ICD-10-CM

## 2022-10-25 ENCOUNTER — OFFICE VISIT (OUTPATIENT)
Dept: FAMILY MEDICINE CLINIC | Facility: CLINIC | Age: 47
End: 2022-10-25

## 2022-10-25 VITALS
HEART RATE: 85 BPM | HEIGHT: 68 IN | TEMPERATURE: 98.5 F | BODY MASS INDEX: 43.19 KG/M2 | DIASTOLIC BLOOD PRESSURE: 78 MMHG | SYSTOLIC BLOOD PRESSURE: 110 MMHG | WEIGHT: 285 LBS

## 2022-10-25 DIAGNOSIS — R13.10 DYSPHAGIA, UNSPECIFIED TYPE: ICD-10-CM

## 2022-10-25 DIAGNOSIS — I10 MILD ESSENTIAL HYPERTENSION: ICD-10-CM

## 2022-10-25 DIAGNOSIS — M25.531 PAIN IN RIGHT WRIST: ICD-10-CM

## 2022-10-25 DIAGNOSIS — E66.01 CLASS 3 SEVERE OBESITY DUE TO EXCESS CALORIES WITH SERIOUS COMORBIDITY AND BODY MASS INDEX (BMI) OF 40.0 TO 44.9 IN ADULT (HCC): ICD-10-CM

## 2022-10-25 DIAGNOSIS — Z11.59 NEED FOR HEPATITIS C SCREENING TEST: ICD-10-CM

## 2022-10-25 DIAGNOSIS — Z12.11 SCREEN FOR COLON CANCER: ICD-10-CM

## 2022-10-25 DIAGNOSIS — E11.65 TYPE 2 DIABETES MELLITUS WITH HYPERGLYCEMIA, WITHOUT LONG-TERM CURRENT USE OF INSULIN (HCC): ICD-10-CM

## 2022-10-25 DIAGNOSIS — R79.89 ELEVATED TSH: ICD-10-CM

## 2022-10-25 DIAGNOSIS — M79.641 RIGHT HAND PAIN: ICD-10-CM

## 2022-10-25 DIAGNOSIS — Z00.00 ANNUAL PHYSICAL EXAM: Primary | ICD-10-CM

## 2022-10-25 DIAGNOSIS — E78.5 HYPERLIPIDEMIA, UNSPECIFIED HYPERLIPIDEMIA TYPE: ICD-10-CM

## 2022-10-25 DIAGNOSIS — H81.13 BENIGN PAROXYSMAL POSITIONAL VERTIGO DUE TO BILATERAL VESTIBULAR DISORDER: ICD-10-CM

## 2022-10-25 DIAGNOSIS — M77.8 RIGHT WRIST TENDONITIS: ICD-10-CM

## 2022-10-25 DIAGNOSIS — Z23 ENCOUNTER FOR IMMUNIZATION: ICD-10-CM

## 2022-10-25 RX ORDER — NAPROXEN 500 MG/1
500 TABLET ORAL DAILY PRN
Status: SHIPPED
Start: 2022-10-25

## 2022-10-25 RX ORDER — MELOXICAM 15 MG/1
15 TABLET ORAL DAILY PRN
Status: SHIPPED
Start: 2022-10-25

## 2022-10-25 RX ORDER — MECLIZINE HCL 12.5 MG/1
12.5 TABLET ORAL EVERY 8 HOURS PRN
Qty: 30 TABLET | Refills: 0 | Status: SHIPPED | OUTPATIENT
Start: 2022-10-25 | End: 2023-07-13

## 2022-10-25 NOTE — PROGRESS NOTES
Assessment/Plan:       Problem List Items Addressed This Visit          Annual PE- Reviewed health maintenance/preventive care  Discussed healthy diet and exercise/activity as able  Avoid excess carbs  Reviewed appropriate vaccinations  Flu vax today  pcv20 next OV  Discussed Covid-19 booster  Screening labwork ordered  Refer for cologuard  Other Visit Diagnoses     Screen for colon cancer        Relevant Orders    Cologuard    Encounter for immunization        Relevant Orders    influenza vaccine, quadrivalent, recombinant, PF, 0 5 mL, for patients 18 yr+ (FLUBLOK) (Completed)    Need for hepatitis C screening test        Relevant Orders    Hepatitis C antibody (Completed)                 Subjective:     Enrique Ramirez is a 52 y o  female here today with chief complaint below:  Chief Complaint   Patient presents with   • Annual Exam     - CC above per clinical staff and reviewed  HPI:  Pt here for annual PE (this note) and f/u chronic conditions  (noted separately)    Had a Covid-19 booster around March or April of 2022  Mammo- scheduled for February  Endo appointment is scheduled in December  She is agreeable to cologuard but not colonoscopy  No changes in bowel habits  No blood in stool  Due for labs      PHQ-2/9 Depression Screening    Little interest or pleasure in doing things: 0 - not at all  Feeling down, depressed, or hopeless: 1 - several days  PHQ-2 Score: 1  PHQ-2 Interpretation: Negative depression screen             The following portions of the patient's history were reviewed and updated as appropriate: allergies, current medications, past family history, past medical history, past social history, past surgical history and problem list     ROS:  Review of Systems       Objective:      /78 (BP Location: Left arm, Patient Position: Sitting, Cuff Size: Large)   Pulse 85   Temp 98 5 °F (36 9 °C)   Ht 5' 8" (1 727 m)   Wt 129 kg (285 lb)   BMI 43 33 kg/m²   BP Readings from Last 3 Encounters:   10/25/22 110/78   02/22/22 122/79   08/18/21 134/88     Wt Readings from Last 3 Encounters:   10/25/22 129 kg (285 lb)   02/22/22 133 kg (293 lb)   08/18/21 133 kg (293 lb)               Physical Exam:   Physical Exam  Vitals and nursing note reviewed  Constitutional:       General: She is not in acute distress  Appearance: She is well-developed  She is obese  She is not ill-appearing  HENT:      Head: Normocephalic and atraumatic  Neck:      Vascular: No carotid bruit  Cardiovascular:      Rate and Rhythm: Normal rate and regular rhythm  Heart sounds: Normal heart sounds  No murmur heard  Pulmonary:      Effort: Pulmonary effort is normal  No respiratory distress  Breath sounds: Normal breath sounds  No wheezing  Abdominal:      Palpations: Abdomen is soft  Tenderness: There is no abdominal tenderness  There is no guarding or rebound  Musculoskeletal:      Cervical back: Neck supple  Right lower leg: No edema  Left lower leg: No edema  Lymphadenopathy:      Cervical: No cervical adenopathy  Skin:     General: Skin is warm and dry  Neurological:      Mental Status: She is alert and oriented to person, place, and time  Psychiatric:         Mood and Affect: Mood normal          Behavior: Behavior normal          BMI Counseling: Body mass index is 43 33 kg/m²  The BMI is above normal  Nutrition recommendations include moderation in carbohydrate intake  Rationale for BMI follow-up plan is due to patient being overweight or obese

## 2022-10-26 ENCOUNTER — TELEPHONE (OUTPATIENT)
Dept: OBGYN CLINIC | Facility: OTHER | Age: 47
End: 2022-10-26

## 2022-10-26 ENCOUNTER — TELEPHONE (OUTPATIENT)
Dept: ADMINISTRATIVE | Facility: OTHER | Age: 47
End: 2022-10-26

## 2022-10-26 ENCOUNTER — LAB (OUTPATIENT)
Dept: LAB | Facility: CLINIC | Age: 47
End: 2022-10-26

## 2022-10-26 DIAGNOSIS — E78.5 HYPERLIPIDEMIA, UNSPECIFIED HYPERLIPIDEMIA TYPE: ICD-10-CM

## 2022-10-26 DIAGNOSIS — R79.89 ELEVATED TSH: ICD-10-CM

## 2022-10-26 DIAGNOSIS — E11.65 TYPE 2 DIABETES MELLITUS WITH HYPERGLYCEMIA, WITHOUT LONG-TERM CURRENT USE OF INSULIN (HCC): ICD-10-CM

## 2022-10-26 DIAGNOSIS — Z11.59 NEED FOR HEPATITIS C SCREENING TEST: ICD-10-CM

## 2022-10-26 LAB
ALBUMIN SERPL BCP-MCNC: 3.8 G/DL (ref 3.5–5)
ALP SERPL-CCNC: 68 U/L (ref 34–104)
ALT SERPL W P-5'-P-CCNC: 15 U/L (ref 7–52)
ANION GAP SERPL CALCULATED.3IONS-SCNC: 8 MMOL/L (ref 4–13)
AST SERPL W P-5'-P-CCNC: 12 U/L (ref 13–39)
BILIRUB SERPL-MCNC: 0.5 MG/DL (ref 0.2–1)
BUN SERPL-MCNC: 13 MG/DL (ref 5–25)
CALCIUM SERPL-MCNC: 9.4 MG/DL (ref 8.4–10.2)
CHLORIDE SERPL-SCNC: 104 MMOL/L (ref 96–108)
CHOLEST SERPL-MCNC: 118 MG/DL
CO2 SERPL-SCNC: 22 MMOL/L (ref 21–32)
CREAT SERPL-MCNC: 0.61 MG/DL (ref 0.6–1.3)
CREAT UR-MCNC: 104 MG/DL
EST. AVERAGE GLUCOSE BLD GHB EST-MCNC: 206 MG/DL
GFR SERPL CREATININE-BSD FRML MDRD: 108 ML/MIN/1.73SQ M
GLUCOSE P FAST SERPL-MCNC: 165 MG/DL (ref 65–99)
HBA1C MFR BLD: 8.8 %
HCV AB SER QL: NORMAL
HDLC SERPL-MCNC: 42 MG/DL
LDLC SERPL CALC-MCNC: 57 MG/DL (ref 0–100)
MICROALBUMIN UR-MCNC: 8.6 MG/L (ref 0–20)
MICROALBUMIN/CREAT 24H UR: 8 MG/G CREATININE (ref 0–30)
NONHDLC SERPL-MCNC: 76 MG/DL
POTASSIUM SERPL-SCNC: 3.9 MMOL/L (ref 3.5–5.3)
PROT SERPL-MCNC: 7 G/DL (ref 6.4–8.4)
SODIUM SERPL-SCNC: 134 MMOL/L (ref 135–147)
TRIGL SERPL-MCNC: 96 MG/DL
TSH SERPL DL<=0.05 MIU/L-ACNC: 2.29 UIU/ML (ref 0.45–4.5)

## 2022-10-26 NOTE — TELEPHONE ENCOUNTER
----- Message from Jennifer Nayak MD sent at 10/25/2022  4:06 PM EDT -----  Regarding: Junius Mortimer in care everywhere- 2/2022

## 2022-10-26 NOTE — TELEPHONE ENCOUNTER
Patient is being referred to a hand specialist  Please schedule accordingly      [address]   [phone]

## 2022-10-27 NOTE — TELEPHONE ENCOUNTER
Upon review of the In Basket request we were able to locate, review, and update the patient chart as requested for Mammogram     Any additional questions or concerns should be emailed to the Practice Liaisons via the appropriate education email address, please do not reply via In Basket      Thank you  Arthur Kaur

## 2022-10-30 NOTE — PROGRESS NOTES
Assessment/Plan:       Problem List Items Addressed This Visit        Endocrine    Type 2 diabetes mellitus with hyperglycemia, without long-term current use of insulin (HCC)     Home BS readings elevated  Due for labs  Due for endocrine f/u  Discussed diet  Obtain copy of eye exam  Foot exam done today  Cardiovascular and Mediastinum    Mild essential hypertension     Controlled  No changes today  Due for labs  Musculoskeletal and Integument    Right wrist tendonitis    Relevant Medications    meloxicam (MOBIC) 15 mg tablet       Other    Hyperlipidemia     On statin  Due for labs  Elevated TSH    Relevant Orders    TSH, 3rd generation with Free T4 reflex (Completed)    Class 3 severe obesity due to excess calories with serious comorbidity and body mass index (BMI) of 40 0 to 44 9 in adult [facility])- comorbidities- type 2 DM, htn, hyperlipidemia  Continue to work on diet/exercise/weight loss  Right hand pain        Chronic, reports > 10 years  would like to see a different hand specialist for eval   contines on prn meloxicam     Relevant Orders    Ambulatory Referral to Orthopedic Surgery    Pain in right wrist        Relevant Medications    meloxicam (MOBIC) 15 mg tablet    Benign paroxysmal positional vertigo due to bilateral vestibular disorder        refilled meclizine for prn use    Relevant Medications    meclizine (ANTIVERT) 12 5 MG tablet    Dysphagia, unspecified type        Relevant Orders    Ambulatory Referral to Otolaryngology                 Subjective:     Andres Saleem is a 52 y o  female here today with chief complaint below:  Chief Complaint   Patient presents with   • Annual Exam     - CC above per clinical staff and reviewed  HPI:  Pt here for annual PE (noted separately) as well as a few concerns/ chronic conditions noted below  Concerns:   R hand/wrist pain for > 10 years    Would like to see a different hand specialist   Was told she has a tendonitis but it seems to be persistent  Using it more make sthe pain worse  DM- overdue for labs  Has endocrine appointment in December  Eye exam- Suzie Cohen on [place]  Diet- weight is down from previous  Trying to avoid excess carbs  She notes that some foods irritate her throat- like popcorn  Then she won't feel like eating  Doesn't feel like a food sticking sensation  More problematic in the throat  Thinks maybe this has caused her to lose a few pounds  Says work is very stressful and also thinks that is part of the weight loss  BS around 150s when she checks  Would like refill on antivert- uses prn vertigo, last attack f vertigo was 2 months ago  The antivert is effective  She had cholesterol done at [employer] work  Is due for labs for endocrine and due for appointment  The following portions of the patient's history were reviewed and updated as appropriate: allergies, current medications, past family history, past medical history, past social history, past surgical history and problem list     ROS:  Review of Systems       Objective:      /78 (BP Location: Left arm, Patient Position: Sitting, Cuff Size: Large)   Pulse 85   Temp 98 5 °F (36 9 °C)   Ht 5' 8" (1 727 m)   Wt 129 kg (285 lb)   BMI 43 33 kg/m²   BP Readings from Last 3 Encounters:   10/25/22 110/78   02/22/22 122/79   08/18/21 134/88     Wt Readings from Last 3 Encounters:   10/25/22 129 kg (285 lb)   02/22/22 133 kg (293 lb)   08/18/21 133 kg (293 lb)               Physical Exam:   Physical Exam  Vitals and nursing note reviewed  Constitutional:       General: She is not in acute distress  Appearance: She is well-developed  She is obese  She is not ill-appearing  HENT:      Head: Normocephalic and atraumatic  Eyes:      Conjunctiva/sclera: Conjunctivae normal    Neck:      Vascular: No carotid bruit  Cardiovascular:      Rate and Rhythm: Normal rate and regular rhythm        Heart sounds: Normal heart sounds  No murmur heard  Pulmonary:      Effort: Pulmonary effort is normal  No respiratory distress  Breath sounds: Normal breath sounds  No wheezing  Abdominal:      Palpations: Abdomen is soft  Tenderness: There is no abdominal tenderness  There is no guarding or rebound  Musculoskeletal:      Cervical back: Neck supple  Right lower leg: No edema  Left lower leg: No edema  Lymphadenopathy:      Cervical: No cervical adenopathy  Skin:     General: Skin is warm and dry  Neurological:      Mental Status: She is alert and oriented to person, place, and time        Gait: Gait normal    Psychiatric:         Mood and Affect: Mood normal          Behavior: Behavior normal

## 2022-10-30 NOTE — ASSESSMENT & PLAN NOTE
Home BS readings elevated  Due for labs  Due for endocrine f/u  Discussed diet  Obtain copy of eye exam  Foot exam done today

## 2022-11-12 NOTE — TELEPHONE ENCOUNTER
Received a called From [employer]  To check on the status of the Prior Authorization, Per OV note on 08/03/2021 pt agree to use started taking Insulin   Please fax any approval or documentation to Pallavi Damon @ Fax [phone]  chest pain

## 2022-11-28 DIAGNOSIS — E11.65 TYPE 2 DIABETES MELLITUS WITH HYPERGLYCEMIA, WITHOUT LONG-TERM CURRENT USE OF INSULIN (HCC): ICD-10-CM

## 2022-11-30 RX ORDER — DULAGLUTIDE 3 MG/.5ML
INJECTION, SOLUTION SUBCUTANEOUS
Qty: 2 ML | Refills: 0 | Status: SHIPPED | OUTPATIENT
Start: 2022-11-30

## 2022-12-02 ENCOUNTER — OFFICE VISIT (OUTPATIENT)
Dept: URGENT CARE | Age: 47
End: 2022-12-02

## 2022-12-02 VITALS
SYSTOLIC BLOOD PRESSURE: 148 MMHG | HEART RATE: 92 BPM | DIASTOLIC BLOOD PRESSURE: 84 MMHG | RESPIRATION RATE: 18 BRPM | OXYGEN SATURATION: 99 %

## 2022-12-02 DIAGNOSIS — J02.9 SORE THROAT: Primary | ICD-10-CM

## 2022-12-02 RX ORDER — AMOXICILLIN AND CLAVULANATE POTASSIUM 875; 125 MG/1; MG/1
1 TABLET, FILM COATED ORAL EVERY 12 HOURS SCHEDULED
Qty: 14 TABLET | Refills: 0 | Status: SHIPPED | OUTPATIENT
Start: 2022-12-02 | End: 2022-12-09

## 2022-12-02 NOTE — PROGRESS NOTES
[facility]        NAME: Madeline Story is a 52 y o  female  : 1975    MRN: [id number]  DATE: 2022  TIME: 11:56 AM    Assessment and Plan   Sore throat [J02 9]  1  Sore throat  amoxicillin-clavulanate (AUGMENTIN) 875-125 mg per tablet            Patient Instructions       Follow up with PCP in 3-5 days  Proceed to  ER if symptoms worsen  Chief Complaint     Chief Complaint   Patient presents with   • Sore Throat     Patient c/o of sore throat for 1 day she rates the pain 9/10         History of Present Illness       Sore Throat   This is a new problem  The current episode started yesterday  The problem has been gradually worsening  Neither side of throat is experiencing more pain than the other  There has been no fever  The pain is at a severity of 9/10  Associated symptoms include a hoarse voice  Pertinent negatives include no abdominal pain, congestion, coughing, diarrhea, drooling, ear discharge, ear pain, headaches, plugged ear sensation, neck pain, shortness of breath, stridor, swollen glands, trouble swallowing or vomiting  She has had no exposure to strep or mono  Review of Systems   Review of Systems   HENT: Positive for hoarse voice and sore throat  Negative for congestion, drooling, ear discharge, ear pain and trouble swallowing  Respiratory: Negative for cough, shortness of breath and stridor  Gastrointestinal: Negative for abdominal pain, diarrhea and vomiting  Musculoskeletal: Negative for neck pain  Neurological: Negative for headaches           Current Medications       Current Outpatient Medications:   •  amoxicillin-clavulanate (AUGMENTIN) 875-125 mg per tablet, Take 1 tablet by mouth every 12 (twelve) hours for 7 days, Disp: 14 tablet, Rfl: 0  •  atorvastatin (LIPITOR) 10 mg tablet, Take 1 tablet (10 mg total) by mouth daily, Disp: 90 tablet, Rfl: 1  •  BD Pen Needle Sindhu 2nd Gen 32G X 4 MM MISC, USE ONE PEN NEEDLE DAILY, Disp: 100 each, Rfl: 6  • Blood Glucose Monitoring Suppl (Contour Next One) KIT, Use 2 (two) times a day, Disp: 1 kit, Rfl: 0  •  Continuous Blood Gluc  (Dexcom G6 ) BLANCHE, Use as directed, Disp: 1 each, Rfl: 0  •  Continuous Blood Gluc Sensor (Dexcom G6 Sensor) MISC, Change every 10 days, Disp: 3 each, Rfl: 11  •  glucose blood (Contour Next Test) test strip, Use 1 each 2 (two) times a day Use as instructed, Disp: 60 each, Rfl: 6  •  insulin glargine (Toujeo SoloStar) 300 units/mL CONCENTRATED U-300 injection pen (1-unit dial), Inject 12 Units under the skin daily, Disp: 5 mL, Rfl: 3  •  lisinopril (ZESTRIL) 10 mg tablet, Take 1 tablet (10 mg total) by mouth daily For blood pressure, Disp: 90 tablet, Rfl: 1  •  meclizine (ANTIVERT) 12 5 MG tablet, Take 1 tablet (12 5 mg total) by mouth every 8 (eight) hours as needed for dizziness, Disp: 30 tablet, Rfl: 0  •  meloxicam (MOBIC) 15 mg tablet, Take 1 tablet (15 mg total) by mouth daily as needed NOT with naproxen, Disp: , Rfl:   •  metFORMIN (GLUCOPHAGE) 1000 MG tablet, Take 1 tablet (1,000 mg total) by mouth 2 (two) times a day with meals, Disp: 180 tablet, Rfl: 0  •  Microlet Lancets MISC, Test blood glucose twice daily  , Disp: 100 each, Rfl: 2  •  naproxen (NAPROSYN) 500 mg tablet, Take 1 tablet (500 mg total) by mouth daily as needed for moderate pain (not with meloxicam), Disp: , Rfl:   •  Trulicity 3 PN/4 6AG SOPN, Inject 3 mg  (0 5 ml) under the skin once weekly  , Disp: 2 mL, Rfl: 0    Current Allergies     Allergies as of 12/02/2022 - Reviewed 12/02/2022   Allergen Reaction Noted   • Hydrocodone-acetaminophen  04/29/2015            The following portions of the patient's history were reviewed and updated as appropriate: allergies, current medications, past family history, past medical history, past social history, past surgical history and problem list      Past Medical History:   Diagnosis Date   • Arthritis    • Benign essential hypertension     Resolved 8/16/2016    • Diabetes mellitus ([facility] [address] )    • Diabetes mellitus type 2, controlled ([facility] [address] )    • Dyspnea and respiratory abnormality     Resolved 2/9/2015    • Headache(784 0)    • Migraine    • Varicella    • Vertigo        Past Surgical History:   Procedure Laterality Date   • HAND SURGERY  09/2014   • KNEE ARTHROPLASTY Right    • WISDOM TOOTH EXTRACTION         Family History   Problem Relation Age of Onset   • Diabetes unspecified Mother    • Diabetes Mother    • Lupus Mother    • Diabetes type II Mother    • Cancer Mother    • Diabetes unspecified Sister    • Diabetes unspecified Paternal Grandfather    • Arrhythmia Paternal Grandfather    • Heart attack Paternal Grandfather    • Heart disease Paternal Grandfather    • Diabetes Sister    • Cancer Paternal Grandmother    • Breast cancer Paternal Grandmother         50-60s   • Heart murmur Father    • Other Father         Right leg amputation    • Cancer Maternal Grandmother         Skin, also maybe breast    • No Known Problems Maternal Grandfather          Medications have been verified  Objective   /84 (BP Location: Right arm, Patient Position: Sitting, Cuff Size: Standard)   Pulse 92   Resp 18   SpO2 99%   No LMP recorded  Physical Exam     Physical Exam  Constitutional:       General: She is not in acute distress  Appearance: Normal appearance  HENT:      Head: Normocephalic  Nose: No congestion or rhinorrhea  Mouth/Throat:      Mouth: Mucous membranes are moist       Pharynx: Posterior oropharyngeal erythema present  No oropharyngeal exudate  Tonsils: 1+ on the right  1+ on the left  Eyes:      General:         Right eye: No discharge  Left eye: No discharge  Conjunctiva/sclera: Conjunctivae normal    Cardiovascular:      Rate and Rhythm: Normal rate and regular rhythm  Pulses: Normal pulses  Pulmonary:      Effort: Pulmonary effort is normal  No respiratory distress  Abdominal:      General: Abdomen is flat  There is no distension  Palpations: Abdomen is soft  Tenderness: There is no abdominal tenderness  Musculoskeletal:      Cervical back: Neck supple  Skin:     General: Skin is warm  Capillary Refill: Capillary refill takes less than 2 seconds  Neurological:      Mental Status: She is alert and oriented to person, place, and time

## 2022-12-08 ENCOUNTER — OFFICE VISIT (OUTPATIENT)
Dept: OBGYN CLINIC | Facility: CLINIC | Age: 47
End: 2022-12-08

## 2022-12-08 ENCOUNTER — APPOINTMENT (OUTPATIENT)
Dept: LAB | Facility: AMBULARY SURGERY CENTER | Age: 47
End: 2022-12-08
Attending: SURGERY

## 2022-12-08 VITALS
SYSTOLIC BLOOD PRESSURE: 124 MMHG | DIASTOLIC BLOOD PRESSURE: 82 MMHG | HEART RATE: 92 BPM | BODY MASS INDEX: 41.98 KG/M2 | HEIGHT: 68 IN | WEIGHT: 277 LBS

## 2022-12-08 DIAGNOSIS — M77.8 RIGHT WRIST TENDONITIS: Primary | ICD-10-CM

## 2022-12-08 DIAGNOSIS — M79.641 RIGHT HAND PAIN: ICD-10-CM

## 2022-12-08 DIAGNOSIS — M77.8 RIGHT WRIST TENDONITIS: ICD-10-CM

## 2022-12-08 LAB
ALBUMIN SERPL BCP-MCNC: 3.6 G/DL (ref 3.5–5)
ALP SERPL-CCNC: 81 U/L (ref 46–116)
ALT SERPL W P-5'-P-CCNC: 29 U/L (ref 12–78)
ANION GAP SERPL CALCULATED.3IONS-SCNC: 8 MMOL/L (ref 4–13)
AST SERPL W P-5'-P-CCNC: 11 U/L (ref 5–45)
B BURGDOR IGG+IGM SER-ACNC: <0.2 AI
BILIRUB SERPL-MCNC: 0.47 MG/DL (ref 0.2–1)
BUN SERPL-MCNC: 13 MG/DL (ref 5–25)
CALCIUM SERPL-MCNC: 9.2 MG/DL (ref 8.3–10.1)
CHLORIDE SERPL-SCNC: 107 MMOL/L (ref 96–108)
CO2 SERPL-SCNC: 20 MMOL/L (ref 21–32)
CREAT SERPL-MCNC: 0.86 MG/DL (ref 0.6–1.3)
CRP SERPL QL: 6.4 MG/L
ERYTHROCYTE [SEDIMENTATION RATE] IN BLOOD: 51 MM/HOUR (ref 0–19)
GFR SERPL CREATININE-BSD FRML MDRD: 80 ML/MIN/1.73SQ M
GLUCOSE P FAST SERPL-MCNC: 212 MG/DL (ref 65–99)
POTASSIUM SERPL-SCNC: 4.1 MMOL/L (ref 3.5–5.3)
PROT SERPL-MCNC: 7.6 G/DL (ref 6.4–8.4)
SODIUM SERPL-SCNC: 135 MMOL/L (ref 135–147)

## 2022-12-08 RX ORDER — MELOXICAM 15 MG/1
15 TABLET ORAL DAILY
Qty: 30 TABLET | Refills: 1 | Status: SHIPPED | OUTPATIENT
Start: 2022-12-08

## 2022-12-08 NOTE — PROGRESS NOTES
Cecilia VENCES  Attending, Orthopaedic Surgery  Hand, Wrist, and Elbow Surgery  [facility]      ORTHOPAEDIC HAND, WRIST, AND ELBOW OFFICE  VISIT       ASSESSMENT/PLAN:      52year old female here for her right FCU tendonitis- Chronic  Although she has treated conservatively with OT in the past  It's recommended re-start the Mobic 15mg 1x/daily for 30 days  Re start her OT exercises with the glides adding in demonstrated wrist extension stretches  Order inflammatory panel  We will follow up in 10 weeks  The patient verbalized understanding of exam findings and treatment plan  We engaged in the shared decision-making process and treatment options were discussed at length with the patient  Surgical and conservative management discussed today along with risks and benefits  Diagnoses and all orders for this visit:    Right wrist tendonitis  -     Comprehensive metabolic panel; Future  -     C-reactive protein; Future  -     Cyclic citrul peptide antibody, IgG; Future  -     dsDNA Antibody by IFA, Talisha monet, with Reflex to Titer; Future  -     HLA-B27 antigen; Future  -     Lyme Total Antibody Profile with reflex to WB; Future  -     Rheumatoid Arthritis Factor; Future  -     Sedimentation rate, automated; Future    Right hand pain  Comments:  Chronic, reports > 10 years  would like to see a different hand specialist for eval   contines on prn meloxicam   Orders:  -     Ambulatory Referral to Orthopedic Surgery        Follow Up:  Return in about 10 weeks (around 2/16/2023)      To Do Next Visit:  Re-evaluation of current issue      ____________________________________________________________________________________________________________________________________________      CHIEF COMPLAINT:  Chief Complaint   Patient presents with   • Right Hand - Pain   • Right Wrist - Pain       SUBJECTIVE:  Praveen Irizarry is a 52y o  year old RHD female who presents today for consultation for her right hand pain referred by her PCP, Dr Thais Shook  Patient is type 2 diabetic with her last hemoglobin A1c of 8 8 on 10/26/2022  Denies numbness and tingling into the fingers  She has treated in the past with Dr Derek Thao, who recommended OT, mobic and bracing- which at that time did not give her significant relief of her pain according to his last note on 3/13/2020  Her mother has a hx of lupus         Pain/symptom timing:  Worse during the day when active  Pain/symptom context:  Worse with activites and work  Pain/symptom modifying factors:  Rest makes better, activities make worse  Pain/symptom associated signs/symptoms: none    Prior treatment   · NSAIDsYes ibuprofen in the past, mobic  · Injections No   · Bracing/Orthotics Yes    Physical Therapy Yes     I have personally reviewed all the relevant PMH, PSH, SH, FH, Medications and allergies      PAST MEDICAL HISTORY:  Past Medical History:   Diagnosis Date   • Arthritis    • Benign essential hypertension     Resolved 8/16/2016    • Diabetes mellitus ([facility] [address] )    • Diabetes mellitus type 2, controlled ([facility] [address] )    • Dyspnea and respiratory abnormality     Resolved 2/9/2015    • Headache(784 0)    • Migraine    • Varicella    • Vertigo        PAST SURGICAL HISTORY:  Past Surgical History:   Procedure Laterality Date   • HAND SURGERY  09/2014   • KNEE ARTHROPLASTY Right    • WISDOM TOOTH EXTRACTION         FAMILY HISTORY:  Family History   Problem Relation Age of Onset   • Diabetes unspecified Mother    • Diabetes Mother    • Lupus Mother    • Diabetes type II Mother    • Cancer Mother    • Diabetes unspecified Sister    • Diabetes unspecified Paternal Grandfather    • Arrhythmia Paternal Grandfather    • Heart attack Paternal Grandfather    • Heart disease Paternal Grandfather    • Diabetes Sister    • Cancer Paternal Grandmother    • Breast cancer Paternal Grandmother         50-60s   • Heart murmur Father    • Other Father         Right leg amputation    • Cancer Maternal Grandmother         Skin, also maybe breast    • No Known Problems Maternal Grandfather        SOCIAL HISTORY:  Social History     Tobacco Use   • Smoking status: Never   • Smokeless tobacco: Never   Vaping Use   • Vaping Use: Never used   Substance Use Topics   • Alcohol use: Yes     Comment: Once in a blue moon   • Drug use: No       MEDICATIONS:    Current Outpatient Medications:   •  amoxicillin-clavulanate (AUGMENTIN) 875-125 mg per tablet, Take 1 tablet by mouth every 12 (twelve) hours for 7 days, Disp: 14 tablet, Rfl: 0  •  atorvastatin (LIPITOR) 10 mg tablet, Take 1 tablet (10 mg total) by mouth daily, Disp: 90 tablet, Rfl: 1  •  BD Pen Needle Sindhu 2nd Gen 32G X 4 MM MISC, USE ONE PEN NEEDLE DAILY, Disp: 100 each, Rfl: 6  •  Blood Glucose Monitoring Suppl (Contour Next One) KIT, Use 2 (two) times a day, Disp: 1 kit, Rfl: 0  •  glucose blood (Contour Next Test) test strip, Use 1 each 2 (two) times a day Use as instructed, Disp: 60 each, Rfl: 6  •  insulin glargine (Toujeo SoloStar) 300 units/mL CONCENTRATED U-300 injection pen (1-unit dial), Inject 12 Units under the skin daily, Disp: 5 mL, Rfl: 3  •  lisinopril (ZESTRIL) 10 mg tablet, Take 1 tablet (10 mg total) by mouth daily For blood pressure, Disp: 90 tablet, Rfl: 1  •  meclizine (ANTIVERT) 12 5 MG tablet, Take 1 tablet (12 5 mg total) by mouth every 8 (eight) hours as needed for dizziness, Disp: 30 tablet, Rfl: 0  •  meloxicam (MOBIC) 15 mg tablet, Take 1 tablet (15 mg total) by mouth daily as needed NOT with naproxen, Disp: , Rfl:   •  metFORMIN (GLUCOPHAGE) 1000 MG tablet, Take 1 tablet (1,000 mg total) by mouth 2 (two) times a day with meals, Disp: 180 tablet, Rfl: 0  •  Microlet Lancets MISC, Test blood glucose twice daily  , Disp: 100 each, Rfl: 2  •  naproxen (NAPROSYN) 500 mg tablet, Take 1 tablet (500 mg total) by mouth daily as needed for moderate pain (not with meloxicam), Disp: , Rfl:   •  Trulicity 3 FL/9 6YO SOPN, Inject 3 mg  (0 5 ml) under the skin once weekly  , Disp: 2 mL, Rfl: 0  •  Continuous Blood Gluc  (Dexcom G6 ) BLANCHE, Use as directed (Patient not taking: Reported on 12/8/2022), Disp: 1 each, Rfl: 0  •  Continuous Blood Gluc Sensor (Dexcom G6 Sensor) MISC, Change every 10 days (Patient not taking: Reported on 12/8/2022), Disp: 3 each, Rfl: 11    ALLERGIES:  Allergies   Allergen Reactions   • Hydrocodone-Acetaminophen      Vicodin--Other reaction(s): BREATHING WAS FUNNY  Tingling sensation in mouth           REVIEW OF SYSTEMS:  Review of Systems   Constitutional: Negative for chills, fever and unexpected weight change  HENT: Negative for hearing loss, nosebleeds and sore throat  Eyes: Negative for pain, redness and visual disturbance  Respiratory: Negative for cough, shortness of breath and wheezing  Cardiovascular: Negative for chest pain, palpitations and leg swelling  Gastrointestinal: Negative for abdominal pain and nausea  Genitourinary: Negative for dyspareunia, dysuria and frequency  Musculoskeletal: Positive for myalgias  Skin: Negative for rash and wound  Neurological: Negative for dizziness, numbness and headaches  Psychiatric/Behavioral: Negative for decreased concentration and suicidal ideas  The patient is not nervous/anxious          VITALS:  Vitals:    12/08/22 1303   BP: 124/82   Pulse: 92       LABS:  HgA1c:   Lab Results   Component Value Date    HGBA1C 8 8 (H) 10/26/2022     BMP:   Lab Results   Component Value Date    CALCIUM 9 4 10/26/2022    K 3 9 10/26/2022    CO2 22 10/26/2022     10/26/2022    BUN 13 10/26/2022    CREATININE 0 61 10/26/2022       _____________________________________________________  PHYSICAL EXAMINATION:  General: well developed and well nourished, alert, oriented times 3 and appears comfortable  Psychiatric: Normal  HEENT: Normocephalic, Atraumatic Trachea Midline, No torticollis  Pulmonary: No audible wheezing or respiratory distress   Abdomen/GI: Non tender, non distended   Cardiovascular: No pitting edema, 2+ radial pulse   Skin: No masses, erythema, lacerations, fluctation, ulcerations  Neurovascular: Sensation Intact to the Median, Ulnar, Radial Nerve, Motor Intact to the Median, Ulnar, Radial Nerve and Pulses Intact  Musculoskeletal: Normal, except as noted in detailed exam and in HPI        MUSCULOSKELETAL EXAMINATION:    Right hand:   Full flexion and extension  Full pronation and supination  No tenderness at the Sl ligament, Scaphoid  - Lugo's test  No tenderness at the thumb CMC  + Tenderness at the FCU  ___________________________________________________  STUDIES REVIEWED:  I have personally reviewed AP lateral and oblique radiographs of right wrist 3 views 8/18/2021 which demonstrate acute fracture dislocation no significant carpal malalignment        PROCEDURES PERFORMED:  Procedures  No Procedures performed today    _____________________________________________________      Sanjay Comfort    I,:  Shankar Vásquez am acting as a scribe while in the presence of the attending physician :       I,:  Sanjuanita Hagan MD personally performed the services described in this documentation    as scribed in my presence :

## 2022-12-09 LAB
CCP AB SER IA-ACNC: <0.4
RHEUMATOID FACT SER QL LA: NEGATIVE

## 2022-12-13 LAB — DSDNA AB SER QL CLIF: NEGATIVE

## 2022-12-14 ENCOUNTER — OFFICE VISIT (OUTPATIENT)
Dept: ENDOCRINOLOGY | Facility: CLINIC | Age: 47
End: 2022-12-14

## 2022-12-14 VITALS
DIASTOLIC BLOOD PRESSURE: 90 MMHG | SYSTOLIC BLOOD PRESSURE: 128 MMHG | WEIGHT: 282 LBS | TEMPERATURE: 97 F | BODY MASS INDEX: 42.74 KG/M2 | HEIGHT: 68 IN | HEART RATE: 88 BPM

## 2022-12-14 DIAGNOSIS — E11.65 TYPE 2 DIABETES MELLITUS WITH HYPERGLYCEMIA, WITHOUT LONG-TERM CURRENT USE OF INSULIN (HCC): Primary | ICD-10-CM

## 2022-12-14 DIAGNOSIS — I10 PRIMARY HYPERTENSION: ICD-10-CM

## 2022-12-14 DIAGNOSIS — E78.2 MIXED HYPERLIPIDEMIA: ICD-10-CM

## 2022-12-14 DIAGNOSIS — R79.89 ELEVATED TSH: ICD-10-CM

## 2022-12-14 RX ORDER — FLASH GLUCOSE SCANNING READER
EACH MISCELLANEOUS
Qty: 1 EACH | Refills: 0 | Status: SHIPPED | OUTPATIENT
Start: 2022-12-14

## 2022-12-14 RX ORDER — FLASH GLUCOSE SENSOR
KIT MISCELLANEOUS
Qty: 6 EACH | Refills: 1 | Status: SHIPPED | OUTPATIENT
Start: 2022-12-14

## 2022-12-14 RX ORDER — FLASH GLUCOSE SCANNING READER
EACH MISCELLANEOUS
COMMUNITY
End: 2022-12-14 | Stop reason: SDUPTHER

## 2022-12-14 RX ORDER — FLASH GLUCOSE SENSOR
KIT MISCELLANEOUS
COMMUNITY
End: 2022-12-14 | Stop reason: SDUPTHER

## 2022-12-14 RX ORDER — INSULIN GLARGINE 300 U/ML
16 INJECTION, SOLUTION SUBCUTANEOUS DAILY
Qty: 5 ML | Refills: 3
Start: 2022-12-14

## 2022-12-14 NOTE — PATIENT INSTRUCTIONS
Hypoglycemia instructions   [facility]  12/14/2022  [id number]    Low Blood Sugar    Steps to treat low blood sugar  1  Test blood sugar if you have symptoms of low blood sugar:   Low Blood Sugar Symptoms:  o Sweaty  o Dizzy  o Rapid heartbeat  o Shaky  o Bad mood  o Hungry      2  Treat blood sugar less than 70 with 15 grams of fast-acting carbohydrate:   Examples of 15 grams Fast-Acting Carbohydrate:  o 4 oz juice  o 4 oz regular soda  o 3-4 glucose tablets (chew)  o 3-4 hard candies (chew)          3  Wait 15 minutes and test your blood sugar again     4   If blood sugar is less than 100, repeat steps 2-3     5  When your blood sugar is 100 or more, eat a snack if it will be longer than one hour until your next meal  The snack should be 15 grams of carbohydrate and a protein:   Examples of snacks:  o ½ sandwich  o 6 crackers with cheese  o Piece of fruit with cheese or peanut butter  o 6 crackers with peanut butter

## 2022-12-14 NOTE — PROGRESS NOTES
Patient Progress Note      CC: DM, elevated TSH  Transfer from CV office      Referring Provider  No referring provider defined for this encounter  History of Present Illness:   Phyllis De La Rosa is a 52 y o  female with a history of type 2 diabetes with long term use of insulin  Diabetes course has been stable  She has not been by endocrinology in over a year  Denies recent illness or hospitalizations  Denies recent severe hypoglycemic or severe hyperglycemic episodes  Denies any issues with her current regimen  Home glucose monitoring: are performed regularly, 2 times a day    No log or meter today   Home blood glucose readings: she reports mornings range 160s and throughout the day it may vary in the low to mid 100s mg/dl     Current regimen: Trulicity 3 mg weekly, metformin 1000 mg twice a day, Toujeo 12 units QHS  Not on Jardiance due to SE  compliant most of the time, denies any side effects from medications  Injects in: thigh, abdomen  Rotates sites: Yes  Hypoglycemic episodes: No, never  H/o of hypoglycemia causing hospitalization or Intervention such as glucagon injection  or ambulance call :  No  Hypoglycemia symptoms: never less than 70 mg/dl  Treatment of hypoglycemia: discussed treatment    Medic alert tag: recommended: Yes    Diabetes education: Not recently  Diet: 3 meals per day, 1 snack per day  Timing of meals is predictable  Diabetic diet compliance:  compliant most of the time  Activity: Daily activity is predictable: Yes  No routine exercise   Ophthamology: due this month  Podiatry: Oct 2022    Has hypertension: on ACE inhibitor/ARB, compliant most of the time  Has hyperlipidemia: on statin - tolerating well, no myalgias  compliant most of the time, denies any side effects from medications  Thyroid disorders: History of elevated TSH  Most recent TSH was normal at 2 293    History of pancreatitis: No    Patient Active Problem List   Diagnosis   • Type 2 diabetes mellitus with hyperglycemia, without long-term current use of insulin (HCC)   • Hyperlipidemia   • Mild essential hypertension   • Elevated TSH   • Right wrist tendonitis   • Class 3 severe obesity due to excess calories with serious comorbidity and body mass index (BMI) of 40 0 to 44 9 in adult [facility])   • Primary osteoarthritis of left knee   • Migraines      Past Medical History:   Diagnosis Date   • Arthritis    • Benign essential hypertension     Resolved 8/16/2016    • Diabetes mellitus ([facility] [address] )    • Diabetes mellitus type 2, controlled ([facility] [address] )    • Dyspnea and respiratory abnormality     Resolved 2/9/2015    • Headache(784 0)    • Migraine    • Varicella    • Vertigo       Past Surgical History:   Procedure Laterality Date   • HAND SURGERY  09/2014   • KNEE ARTHROPLASTY Right    • WISDOM TOOTH EXTRACTION        Family History   Problem Relation Age of Onset   • Diabetes unspecified Mother    • Diabetes Mother    • Lupus Mother    • Diabetes type II Mother    • Cancer Mother    • Diabetes unspecified Sister    • Diabetes unspecified Paternal Grandfather    • Arrhythmia Paternal Grandfather    • Heart attack Paternal Grandfather    • Heart disease Paternal Grandfather    • Diabetes Sister    • Cancer Paternal Grandmother    • Breast cancer Paternal Grandmother         50-60s   • Heart murmur Father    • Other Father         Right leg amputation    • Cancer Maternal Grandmother         Skin, also maybe breast    • No Known Problems Maternal Grandfather      Social History     Tobacco Use   • Smoking status: Never   • Smokeless tobacco: Never   Substance Use Topics   • Alcohol use: Yes     Comment: Once in a blue moon     Allergies   Allergen Reactions   • Hydrocodone-Acetaminophen      Vicodin--Other reaction(s): BREATHING WAS FUNNY  Tingling sensation in mouth         Current Outpatient Medications:   •  atorvastatin (LIPITOR) 10 mg tablet, Take 1 tablet (10 mg total) by mouth daily, Disp: 90 tablet, Rfl: 1  •  BD Pen Needle Sindhu 2nd Gen 32G X 4 MM MISC, USE ONE PEN NEEDLE DAILY, Disp: 100 each, Rfl: 6  •  Blood Glucose Monitoring Suppl (Contour Next One) KIT, Use 2 (two) times a day, Disp: 1 kit, Rfl: 0  •  glucose blood (Contour Next Test) test strip, Use 1 each 2 (two) times a day Use as instructed, Disp: 60 each, Rfl: 6  •  insulin glargine (Toujeo SoloStar) 300 units/mL CONCENTRATED U-300 injection pen (1-unit dial), Inject 16 Units under the skin daily, Disp: 5 mL, Rfl: 3  •  lisinopril (ZESTRIL) 10 mg tablet, Take 1 tablet (10 mg total) by mouth daily For blood pressure, Disp: 90 tablet, Rfl: 1  •  meclizine (ANTIVERT) 12 5 MG tablet, Take 1 tablet (12 5 mg total) by mouth every 8 (eight) hours as needed for dizziness, Disp: 30 tablet, Rfl: 0  •  meloxicam (MOBIC) 15 mg tablet, Take 1 tablet (15 mg total) by mouth daily as needed NOT with naproxen, Disp: , Rfl:   •  meloxicam (Mobic) 15 mg tablet, Take 1 tablet (15 mg total) by mouth daily, Disp: 30 tablet, Rfl: 1  •  metFORMIN (GLUCOPHAGE) 1000 MG tablet, Take 1 tablet (1,000 mg total) by mouth 2 (two) times a day with meals, Disp: 180 tablet, Rfl: 0  •  Microlet Lancets MISC, Test blood glucose twice daily  , Disp: 100 each, Rfl: 2  •  naproxen (NAPROSYN) 500 mg tablet, Take 1 tablet (500 mg total) by mouth daily as needed for moderate pain (not with meloxicam), Disp: , Rfl:   •  Trulicity 3 HB/9 2OC SOPN, Inject 3 mg  (0 5 ml) under the skin once weekly  , Disp: 2 mL, Rfl: 0  •  Continuous Blood Gluc  (Dexcom G6 ) BLANCHE, Use as directed (Patient not taking: Reported on 12/8/2022), Disp: 1 each, Rfl: 0  •  Continuous Blood Gluc Sensor (Dexcom G6 Sensor) MISC, Change every 10 days (Patient not taking: Reported on 12/8/2022), Disp: 3 each, Rfl: 11  Review of Systems   Constitutional: Negative for activity change, appetite change, fatigue and unexpected weight change  HENT: Negative for trouble swallowing  Eyes: Negative for visual disturbance     Respiratory: Negative for shortness of breath  Cardiovascular: Negative for chest pain and palpitations  Gastrointestinal: Negative for constipation and diarrhea  Endocrine: Negative for polydipsia and polyuria  Musculoskeletal: Positive for arthralgias  Skin: Negative for wound  Neurological: Negative for numbness  Psychiatric/Behavioral: Negative  Physical Exam:  Body mass index is 42 88 kg/m²  /90   Pulse 88   Temp (!) 97 °F (36 1 °C) (Skin)   Ht 5' 8" (1 727 m)   Wt 128 kg (282 lb)   BMI 42 88 kg/m²    Wt Readings from Last 3 Encounters:   12/14/22 128 kg (282 lb)   12/08/22 126 kg (277 lb)   10/25/22 129 kg (285 lb)       Physical Exam  Vitals and nursing note reviewed  Constitutional:       Appearance: She is well-developed  HENT:      Head: Normocephalic  Eyes:      General: No scleral icterus  Pupils: Pupils are equal, round, and reactive to light  Neck:      Thyroid: No thyromegaly  Cardiovascular:      Rate and Rhythm: Normal rate and regular rhythm  Pulses:           Radial pulses are 2+ on the right side and 2+ on the left side  Heart sounds: No murmur heard  Pulmonary:      Effort: Pulmonary effort is normal  No respiratory distress  Breath sounds: Normal breath sounds  No wheezing  Musculoskeletal:      Cervical back: Neck supple  Skin:     General: Skin is warm and dry  Neurological:      Mental Status: She is alert  Patient's shoes and socks were not removed            Labs:   Component      Latest Ref Rng & Units 9/14/2021 10/26/2022 12/8/2022   Sodium      135 - 147 mmol/L  134 (L) 135   Potassium      3 5 - 5 3 mmol/L  3 9 4 1   Chloride      96 - 108 mmol/L  104 107   CO2      21 - 32 mmol/L  22 20 (L)   Anion Gap      4 - 13 mmol/L  8 8   BUN      5 - 25 mg/dL  13 13   Creatinine      0 60 - 1 30 mg/dL  0 61 0 86   GLUCOSE FASTING      65 - 99 mg/dL  165 (H) 212 (H)   Calcium      8 3 - 10 1 mg/dL  9 4 9 2   AST      5 - 45 U/L  12 (L) 11 ALT      12 - 78 U/L  15 29   Alkaline Phosphatase      46 - 116 U/L  68 81   Total Protein      6 4 - 8 4 g/dL  7 0 7 6   Albumin      3 5 - 5 0 g/dL  3 8 3 6   TOTAL BILIRUBIN      0 20 - 1 00 mg/dL  0 50 0 47   eGFR      ml/min/1 73sq m  108 80   Cholesterol      See Comment mg/dL  118    Triglycerides      See Comment mg/dL  96    HDL      >=50 mg/dL  42 (L)    LDL Calculated      0 - 100 mg/dL  57    Non-HDL Cholesterol      mg/dl  76    EXT Creatinine Urine      mg/dL  104 0    MICROALBUM ,U,RANDOM      0 0 - 20 0 mg/L  8 6    MICROALBUMIN/CREATININE RATIO      0 - 30 mg/g creatinine  8    Hemoglobin A1C      Normal 3 8-5 6%; PreDiabetic 5 7-6 4%; Diabetic >=6 5%; Glycemic control for adults with diabetes <7 0% % 9 0 8 8 (H)    eAG, EST AVG Glucose      mg/dl  206    TSH 3RD GENERATON      0 450 - 4 500 uIU/mL  2 293        Plan:    Diagnoses and all orders for this visit:    Type 2 diabetes mellitus with hyperglycemia, without long-term current use of insulin ([facility])  HGA1C 8 8%  Improved  Treatment regimen: increase Toujeo to 16 units QHS as morning BG is elevated  Send log in 2 weeks  Patient interested in Eduardo - will send script  Discussed intensive insulin regimen does increase risk for hypoglycemia  Episodes of hypoglycemia can lead to permanent disability and death  Discussed risks/complications associated with uncontrolled diabetes  Advised to adhere to diabetic diet, and recommended staying active/exercising routinely as tolerated  Keep carbohydrates consistent to limit blood glucose fluctuations  Advised to call if blood sugars less than 70 mg/dl or over 300 mg/dl  Check blood glucose 2 times a day  Discussed symptoms and treatment of hypoglycemia  Discussed use of CGM to collect additional blood glucose data to reveal trends and patterns that can be used to optimize treatment plan  Referred to diabetes/nutrition education     Recommended routine follow-up with podiatry and ophthalmology  Send log in 1-2 weeks  Ordered blood work to complete prior to next visit  -     insulin glargine (Toujeo SoloStar) 300 units/mL CONCENTRATED U-300 injection pen (1-unit dial); Inject 16 Units under the skin daily  -     Hemoglobin A1C; Future  -     Basic metabolic panel; Future  -     Microalbumin / creatinine urine ratio; Future  -     Ambulatory referral to Diabetic Education; Future    Primary hypertension  Blood pressure is elevated  Advised to monitor BP at home and follow-up with PCP if remaining elevated, at or above 140/90  For now continue current treatment  BP Readings from Last 3 Encounters:   12/14/22 128/90   12/08/22 124/82   12/02/22 148/84     -     Basic metabolic panel; Future    Mixed hyperlipidemia  LDL previously 57  Continue statin therapy    Elevated TSH  History of elevated TSH  Most recent TSH was normal at 2 293  Discussed with the patient diagnosis and treatment and all questions fully answered  She will call me if any problems arise  Counseled patient on diagnostic results, prognosis, risk and benefit of treatment options, instruction for management, importance of treatment compliance, risk factor reduction and impressions        Suzanne Marshall PA-C

## 2022-12-20 LAB — HLA-B27 QL NAA+PROBE: NEGATIVE

## 2023-01-23 ENCOUNTER — PATIENT MESSAGE (OUTPATIENT)
Dept: FAMILY MEDICINE CLINIC | Facility: CLINIC | Age: 48
End: 2023-01-23

## 2023-01-23 DIAGNOSIS — I10 MILD ESSENTIAL HYPERTENSION: ICD-10-CM

## 2023-01-23 DIAGNOSIS — E78.5 HYPERLIPIDEMIA, UNSPECIFIED HYPERLIPIDEMIA TYPE: ICD-10-CM

## 2023-01-23 RX ORDER — ATORVASTATIN CALCIUM 10 MG/1
10 TABLET, FILM COATED ORAL DAILY
Qty: 90 TABLET | Refills: 1 | Status: SHIPPED | OUTPATIENT
Start: 2023-01-23

## 2023-01-23 RX ORDER — LISINOPRIL 10 MG/1
10 TABLET ORAL DAILY
Qty: 90 TABLET | Refills: 1 | Status: SHIPPED | OUTPATIENT
Start: 2023-01-23

## 2023-01-30 DIAGNOSIS — E11.65 TYPE 2 DIABETES MELLITUS WITH HYPERGLYCEMIA, WITHOUT LONG-TERM CURRENT USE OF INSULIN (HCC): ICD-10-CM

## 2023-01-30 RX ORDER — DULAGLUTIDE 3 MG/.5ML
INJECTION, SOLUTION SUBCUTANEOUS
Qty: 6 ML | Refills: 1 | Status: SHIPPED | OUTPATIENT
Start: 2023-01-30

## 2023-02-17 NOTE — RESULT ENCOUNTER NOTE
Hubert Mcdonald,  Your mammogram is normal. We recommend you schedule your next mammogram in one year.    Have a good day,   Dr. Alireza Lundberg

## 2023-02-20 ENCOUNTER — HOSPITAL ENCOUNTER (OUTPATIENT)
Dept: RADIOLOGY | Facility: HOSPITAL | Age: 48
Discharge: HOME | End: 2023-02-20

## 2023-02-20 ENCOUNTER — OFFICE VISIT (OUTPATIENT)
Dept: OBGYN CLINIC | Facility: CLINIC | Age: 48
End: 2023-02-20

## 2023-02-20 VITALS
SYSTOLIC BLOOD PRESSURE: 142 MMHG | DIASTOLIC BLOOD PRESSURE: 88 MMHG | HEART RATE: 96 BPM | HEIGHT: 68 IN | WEIGHT: 282 LBS | OXYGEN SATURATION: 99 % | BODY MASS INDEX: 42.74 KG/M2

## 2023-02-20 DIAGNOSIS — M25.562 LEFT KNEE PAIN, UNSPECIFIED CHRONICITY: Primary | ICD-10-CM

## 2023-02-20 DIAGNOSIS — M17.12 PRIMARY OSTEOARTHRITIS OF LEFT KNEE: ICD-10-CM

## 2023-02-20 DIAGNOSIS — M25.562 LEFT KNEE PAIN, UNSPECIFIED CHRONICITY: ICD-10-CM

## 2023-02-20 RX ORDER — BUPIVACAINE HYDROCHLORIDE 5 MG/ML
2 INJECTION, SOLUTION EPIDURAL; INTRACAUDAL
Status: COMPLETED | OUTPATIENT
Start: 2023-02-20 | End: 2023-02-20

## 2023-02-20 RX ORDER — LIDOCAINE HYDROCHLORIDE 10 MG/ML
2 INJECTION, SOLUTION INFILTRATION; PERINEURAL
Status: COMPLETED | OUTPATIENT
Start: 2023-02-20 | End: 2023-02-20

## 2023-02-20 RX ADMIN — LIDOCAINE HYDROCHLORIDE 2 ML: 10 INJECTION, SOLUTION INFILTRATION; PERINEURAL at 14:58

## 2023-02-20 RX ADMIN — BUPIVACAINE HYDROCHLORIDE 2 ML: 5 INJECTION, SOLUTION EPIDURAL; INTRACAUDAL at 14:58

## 2023-02-20 NOTE — PROGRESS NOTES
Assessment/Plan   Diagnoses and all orders for this visit:    Left knee pain, unspecified chronicity    Primary osteoarthritis of left knee    - IA toradol injection today  - Ice as needed  - Follow up with Dr Julia Barahona in 3mo          Subjective   Patient ID: Baldomero Melendez is a 52 y o  female  Vitals:    02/20/23 1420   BP: 142/88   Pulse: 96   SpO2: 99%     46yo female comes in for an evaluation of her left knee  She has been having ongoing left knee pain with no specific injury  She is a former patient of Dr Maggie Cohen who saw him for patellofemoral arthritis in 2019  This was confirmed by MRI  No new injury          The following portions of the patient's history were reviewed and updated as appropriate: allergies, current medications, past family history, past medical history, past social history, past surgical history and problem list     Review of Systems  Ortho Exam  Past Medical History:   Diagnosis Date   • Arthritis    • Benign essential hypertension     Resolved 8/16/2016    • Diabetes mellitus ([address] )    • Diabetes mellitus type 2, controlled ([facility] [address] )    • Dyspnea and respiratory abnormality     Resolved 2/9/2015    • Headache(784 0)    • Migraine    • Varicella    • Vertigo      Past Surgical History:   Procedure Laterality Date   • HAND SURGERY  09/2014   • KNEE ARTHROPLASTY Right    • WISDOM TOOTH EXTRACTION       Family History   Problem Relation Age of Onset   • Diabetes unspecified Mother    • Diabetes Mother    • Lupus Mother    • Diabetes type II Mother    • Cancer Mother    • Diabetes unspecified Sister    • Diabetes unspecified Paternal Grandfather    • Arrhythmia Paternal Grandfather    • Heart attack Paternal Grandfather    • Heart disease Paternal Grandfather    • Diabetes Sister    • Cancer Paternal Grandmother    • Breast cancer Paternal Grandmother         50-60s   • Heart murmur Father    • Other Father         Right leg amputation    • Cancer Maternal Grandmother         Skin, also maybe breast    • No Known Problems Maternal Grandfather      Social History     Occupational History   • Not on file   Tobacco Use   • Smoking status: Never   • Smokeless tobacco: Never   Vaping Use   • Vaping Use: Never used   Substance and Sexual Activity   • Alcohol use: Yes     Comment: Once in a blue moon   • Drug use: No   • Sexual activity: Yes     Partners: Male     Birth control/protection: None       Review of Systems   Constitutional: Negative  HENT: Negative  Eyes: Negative  Respiratory: Negative  Cardiovascular: Negative  Gastrointestinal: Negative  Endocrine: Negative  Genitourinary: Negative  Musculoskeletal: As below      Allergic/Immunologic: Negative  Neurological: Negative  Hematological: Negative  Psychiatric/Behavioral: Negative  Objective   Physical Exam      · Constitutional: Awake, Alert, Oriented  · Eyes: EOMI  · Psych: Mood and affect appropriate  · Heart: regular rate   · Lungs: No audible wheezing  · Abdomen: No guarding  · Lymph: no lymphedema  • left Knee:  - Appearance  • No swelling, discoloration, deformity, or ecchymosis  - Effusion  • none  - Palpation  • + Tenderness medial joint line , + Tenderness lateral joint line  and Otherwise no tenderness of the patella, patellar tendon, MCL, LCL, pes anserine, medial / lateral plateau  - ROM  • Extension: 0 and Flexion: 130  - Special Tests  • Amaya's Test negative, Lachman's Test negative, Anterior Drawer Test negative, Posterior Drawer Test negative, Valgus Stress Test negative, Varus Stress Test negative and Patellar apprehension negative  - Motor  • normal 5/5 in all planes  - NVI distally    I have personally reviewed pertinent films in PACS and my interpretation is Anterior and medial joint space OA  Large joint arthrocentesis: L knee  Universal Protocol:  Consent: Verbal consent obtained    Risks and benefits: risks, benefits and alternatives were discussed  Consent given by: patient  Time out: Immediately prior to procedure a "time out" was called to verify the correct patient, procedure, equipment, support staff and site/side marked as required  Timeout called at: 2/20/2023 2:56 PM   Patient understanding: patient states understanding of the procedure being performed  Site marked: the operative site was marked  Radiology Images displayed and confirmed   If images not available, report reviewed: imaging studies available  Patient identity confirmed: verbally with patient    Supporting Documentation  Indications: pain   Procedure Details  Location: knee - L knee  Needle size: 22 G  Ultrasound guidance: no  Approach: lateral  Medications administered: 2 mL bupivacaine (PF) 0 5 %; 2 mL lidocaine 1 % (1ml ketorolac)    Patient tolerance: patient tolerated the procedure well with no immediate complications  Dressing:  Sterile dressing applied

## 2023-03-01 DIAGNOSIS — E11.65 TYPE 2 DIABETES MELLITUS WITH HYPERGLYCEMIA, WITHOUT LONG-TERM CURRENT USE OF INSULIN (HCC): ICD-10-CM

## 2023-03-01 RX ORDER — DULAGLUTIDE 3 MG/.5ML
INJECTION, SOLUTION SUBCUTANEOUS
Qty: 6 ML | Refills: 1 | Status: SHIPPED | OUTPATIENT
Start: 2023-03-01

## 2023-03-06 DIAGNOSIS — M17.12 PRIMARY OSTEOARTHRITIS OF LEFT KNEE: Primary | ICD-10-CM

## 2023-03-16 ENCOUNTER — EVALUATION (OUTPATIENT)
Dept: PHYSICAL THERAPY | Facility: CLINIC | Age: 48
End: 2023-03-16

## 2023-03-16 DIAGNOSIS — M17.12 PRIMARY OSTEOARTHRITIS OF LEFT KNEE: ICD-10-CM

## 2023-03-16 DIAGNOSIS — M25.562 ACUTE PAIN OF LEFT KNEE: Primary | ICD-10-CM

## 2023-03-16 NOTE — PROGRESS NOTES
PT Evaluation     Today's date: 3/16/2023  Patient name: Ruth Perkins  : 1975  MRN: [id number]  Referring provider: Abhinav Marks  Dx:   Encounter Diagnosis     ICD-10-CM    1  Acute pain of left knee  M25 562       2  Primary osteoarthritis of left knee  M17 12 Ambulatory Referral to Physical Therapy                     Assessment  Assessment details: Pt presents with s/s consistent with L PF OA  She will benefit from skilled PT services to address her impairments in order to decrease pain and restore function  Impairments: abnormal gait, abnormal muscle firing, abnormal or restricted ROM, abnormal movement, activity intolerance, impaired physical strength, lacks appropriate home exercise program, pain with function, weight-bearing intolerance and poor body mechanics  Understanding of Dx/Px/POC: good   Prognosis: good    Goals  STG - 4 wks  1) pt will demonstrate 0-130 deg PROM  2) pt will improve STS mechanics 50%  3) pt will improve pain 50%    LTG - 8-12 wks  1) pt will demonstrate 4+/5 hip and knee strength  2) pt will demonstrate normalized STS and gait mechanics  3) pt will improve pain 75%    Plan  Planned modality interventions: low level laser therapy  Planned therapy interventions: joint mobilization, manual therapy, balance/weight bearing training, body mechanics training, neuromuscular re-education, patient education, strengthening, stretching, therapeutic activities, therapeutic exercise, home exercise program, gait training, functional ROM exercises and flexibility  Frequency: 1x week  Duration in weeks: 12  Treatment plan discussed with: patient        Subjective Evaluation    History of Present Illness  Mechanism of injury: Pt is a 52 y o  female with PMHx significant for DM II, obesity, R knee scope, HTN, migraines  She reports insidious onset of anterior L knee pain 2 mos ago  She denies trauma or FELIZ  She denies giving-way, locking      Pain  Current pain ratin  At best pain ratin  At worst pain rating: 10  Quality: sharp and pressure  Relieving factors: change in position  Aggravating factors: stair climbing and sitting  Progression: no change      Diagnostic Tests  X-ray: abnormal (mild PF OA)  Treatments  Previous treatment: injection treatment (no change)  Patient Goals  Patient goals for therapy: decreased pain          Objective     Observations   Left Knee   Negative for effusion  Tenderness   Left Knee   Tenderness in the medial joint line  No tenderness in the lateral joint line, lateral retinaculum, medial retinaculum and patellar tendon  Passive Range of Motion   Left Knee   Flexion: 120 degrees with pain  Extension: -2 degrees     Mobility   Patellar Mobility:   Left Knee   WFL: lateral and superior  Hypomobile: left medial and left inferior    Patellar Static Positioning   Left Knee: lateral tilt    Strength/Myotome Testing     Left Hip   Planes of Motion   Flexion: 4-  Extension: 4-  Abduction: 4  Adduction: 4    Left Knee   Flexion: 4  Extension: 4  Quadriceps contraction: good    Tests     Left Knee   Positive patella-femoral grind  Negative lateral Amaya, medial Amaya, valgus stress test at 0 degrees, valgus stress test at 30 degrees, varus stress test at 0 degrees and varus stress test at 30 degrees  General Comments:      Knee Comments  Gait: B femoral IR, limits B knee flexion during loading response    STS: moderate B knee valgus, quad pattern             Precautions:  PMHx: DM II, obesity, R knee scope, HTN, migraines  Dx: L PF OA    Manuals 3/16            Gr IV medial, inferior patellar mobs             L knee flexion PROM             Laser L PF joint                          Neuro Re-Ed                                                                                                        Ther Ex             Heel slides 5"x10            Supine HA stretch 15"x3            Standing GA stretch             SLR 2x10            SL hip ABD 2x10 clamshells                                       Ther Activity             STS 1x5                         Gait Training                                       Modalities

## 2023-03-17 LAB
CREAT ?TM UR-SCNC: 130 UMOL/L
EXT MICROALBUMIN URINE RANDOM: 1.2
HBA1C MFR BLD HPLC: 9.4 %
MICROALBUMIN/CREAT UR: 9.2 MG/G{CREAT}

## 2023-03-17 PROCEDURE — 3046F HEMOGLOBIN A1C LEVEL >9.0%: CPT | Performed by: PHYSICIAN ASSISTANT

## 2023-03-22 ENCOUNTER — OFFICE VISIT (OUTPATIENT)
Dept: PHYSICAL THERAPY | Facility: CLINIC | Age: 48
End: 2023-03-22

## 2023-03-22 DIAGNOSIS — M25.562 ACUTE PAIN OF LEFT KNEE: ICD-10-CM

## 2023-03-22 DIAGNOSIS — M17.12 PRIMARY OSTEOARTHRITIS OF LEFT KNEE: Primary | ICD-10-CM

## 2023-03-22 NOTE — PROGRESS NOTES
Daily Note     Today's date: 3/22/2023  Patient name: Marisabel Saba  : 1975  MRN: [id number]  Referring provider: Barbara Street  Dx:   Encounter Diagnosis     ICD-10-CM    1  Primary osteoarthritis of left knee  M17 12       2  Acute pain of left knee  M25 562                      Subjective: Pt reports good compliance with HEP  Objective: See treatment diary below    Assessment: Pt demonstrated improved pain following joint mobs and stretches  Plan: Cont  POC  Precautions:  PMHx: DM II, obesity, R knee scope, HTN, migraines  Dx: L PF OA    Manuals 3/16 3/22           Gr IV medial, inferior patellar mobs  1x75 ea           L knee flexion PROM  10"x5           Laser L PF joint  4000J                        Neuro Re-Ed                                                                                                        Ther Ex             Heel slides 5"x10 5"x15           Supine HA stretch 15"x3 15"x3           Standing GA stretch  15"xx3           SLR 2x10 3x10           SL hip ABD 2x10 3x10           clamshells  R/  3x10                                     Ther Activity             STS 1x5 1x10                        Gait Training                                       Modalities

## 2023-03-27 DIAGNOSIS — E11.65 TYPE 2 DIABETES MELLITUS WITH HYPERGLYCEMIA, WITHOUT LONG-TERM CURRENT USE OF INSULIN (HCC): ICD-10-CM

## 2023-03-29 ENCOUNTER — OFFICE VISIT (OUTPATIENT)
Dept: PHYSICAL THERAPY | Facility: CLINIC | Age: 48
End: 2023-03-29

## 2023-03-29 DIAGNOSIS — M17.12 PRIMARY OSTEOARTHRITIS OF LEFT KNEE: Primary | ICD-10-CM

## 2023-03-29 DIAGNOSIS — M25.562 ACUTE PAIN OF LEFT KNEE: ICD-10-CM

## 2023-03-29 NOTE — PROGRESS NOTES
"Daily Note     Today's date: 3/29/2023  Patient name: Jessica Quick  : 1975  MRN: [id number]  Referring provider: Alexis Kennedy  Dx:   Encounter Diagnosis     ICD-10-CM    1  Primary osteoarthritis of left knee  M17 12       2  Acute pain of left knee  M25 562                      Subjective: Pt reports no compliance with HEP x 5 days secondary to high pain levels  Objective: See treatment diary below    Assessment: Pt demonstrated improved PF joint mobility following mobs and significant guarding with manual passive flexion  Pt demonstrated inability to fully correct dynamic knee valgus with STS despite extensive cueing  Plan: Assess response nv and adjust workload accordingly  Precautions:  PMHx: DM II, obesity, R knee scope, HTN, migraines  Dx: L PF OA    Manuals 3/16 3/22 3/29          Gr IV medial, inferior patellar mobs  1x75 ea 1x100 ea          L knee flexion PROM  10\"x5 10\"x6          Laser L PF joint  4000J 5000J                       Neuro Re-Ed                                                                                                        Ther Ex             Heel slides 5\"x10 5\"x15 5\"x15          Supine HA stretch 15\"x3 15\"x3 15\"x3          Standing GA stretch  15\"xx3 15\"x3          SLR 2x10 3x10 3x10          SL hip ABD 2x10 3x10 3x10          clamshells  R/  3x10 R/  3x10                                    Ther Activity             STS 1x5 1x10 1x5                       Gait Training                                       Modalities                                            "

## 2023-04-05 ENCOUNTER — OFFICE VISIT (OUTPATIENT)
Dept: PHYSICAL THERAPY | Facility: CLINIC | Age: 48
End: 2023-04-05

## 2023-04-05 DIAGNOSIS — M25.562 ACUTE PAIN OF LEFT KNEE: Primary | ICD-10-CM

## 2023-04-05 DIAGNOSIS — M17.12 PRIMARY OSTEOARTHRITIS OF LEFT KNEE: ICD-10-CM

## 2023-04-05 NOTE — PROGRESS NOTES
"Daily Note     Today's date: 2023  Patient name: Mireya Grewal  : 1975  MRN: [id number]  Referring provider: Huey Verdin  Dx:   Encounter Diagnosis     ICD-10-CM    1  Acute pain of left knee  M25 562       2  Primary osteoarthritis of left knee  M17 12                      Subjective: Pt reports improved stiffness and pain with HEP compliance  Objective: See treatment diary below    Assessment: Pt demonstrated improved PF joint mobility and tolerance to TE but continues with moderate B knee valgus and severe L>R overpronation during STS  Pt will benefit from an orthotics assessment  Plan: Cont  POC  Precautions:  PMHx: DM II, obesity, R knee scope, HTN, migraines  Dx: L PF OA    Manuals 3/16 3/22 3/29 4         Gr IV medial, inferior patellar mobs  1x75 ea 1x100 ea 1x100 ea         L knee flexion PROM  10\"x5 10\"x6 4 min         Laser L PF joint  4000J 5000J 6000J                      Neuro Re-Ed                                                                                                        Ther Ex             Heel slides 5\"x10 5\"x15 5\"x15 5\"x20         Supine HA stretch 15\"x3 15\"x3 15\"x3 15\"x3         Standing GA stretch  15\"xx3 15\"x3 15\"x3         SLR 2x10 3x10 3x10 3x12         SL hip ABD 2x10 3x10 3x10 3x12         clamshells  R/  3x10 R/  3x10 R/  3x10                                   Ther Activity             STS 1x5 1x10 1x5 1x5                      Gait Training                                       Modalities                                            "

## 2023-04-12 ENCOUNTER — APPOINTMENT (OUTPATIENT)
Dept: PHYSICAL THERAPY | Facility: CLINIC | Age: 48
End: 2023-04-12

## 2023-04-30 DIAGNOSIS — E11.65 TYPE 2 DIABETES MELLITUS WITH HYPERGLYCEMIA, WITHOUT LONG-TERM CURRENT USE OF INSULIN (HCC): ICD-10-CM

## 2023-05-09 ENCOUNTER — OFFICE VISIT (OUTPATIENT)
Dept: OBGYN CLINIC | Facility: CLINIC | Age: 48
End: 2023-05-09

## 2023-05-09 ENCOUNTER — OFFICE VISIT (OUTPATIENT)
Dept: ENDOCRINOLOGY | Facility: CLINIC | Age: 48
End: 2023-05-09

## 2023-05-09 VITALS
OXYGEN SATURATION: 99 % | DIASTOLIC BLOOD PRESSURE: 82 MMHG | HEIGHT: 68 IN | SYSTOLIC BLOOD PRESSURE: 122 MMHG | HEART RATE: 81 BPM | BODY MASS INDEX: 43.04 KG/M2 | WEIGHT: 284 LBS | TEMPERATURE: 97.4 F

## 2023-05-09 VITALS
HEART RATE: 71 BPM | SYSTOLIC BLOOD PRESSURE: 140 MMHG | WEIGHT: 282 LBS | DIASTOLIC BLOOD PRESSURE: 92 MMHG | BODY MASS INDEX: 42.74 KG/M2 | HEIGHT: 68 IN

## 2023-05-09 DIAGNOSIS — E11.65 TYPE 2 DIABETES MELLITUS WITH HYPERGLYCEMIA, WITHOUT LONG-TERM CURRENT USE OF INSULIN (HCC): Primary | ICD-10-CM

## 2023-05-09 DIAGNOSIS — E66.01 CLASS 3 SEVERE OBESITY DUE TO EXCESS CALORIES WITH SERIOUS COMORBIDITY AND BODY MASS INDEX (BMI) OF 40.0 TO 44.9 IN ADULT (HCC): ICD-10-CM

## 2023-05-09 DIAGNOSIS — L68.0 HIRSUTISM: ICD-10-CM

## 2023-05-09 DIAGNOSIS — M17.12 PRIMARY OSTEOARTHRITIS OF LEFT KNEE: ICD-10-CM

## 2023-05-09 DIAGNOSIS — E78.2 MIXED HYPERLIPIDEMIA: ICD-10-CM

## 2023-05-09 DIAGNOSIS — M76.32 IT BAND SYNDROME, LEFT: ICD-10-CM

## 2023-05-09 DIAGNOSIS — E55.9 VITAMIN D DEFICIENCY: ICD-10-CM

## 2023-05-09 DIAGNOSIS — R79.89 ELEVATED TSH: ICD-10-CM

## 2023-05-09 DIAGNOSIS — M22.2X2 PATELLOFEMORAL PAIN SYNDROME OF LEFT KNEE: Primary | ICD-10-CM

## 2023-05-09 RX ORDER — DEXAMETHASONE 1 MG
TABLET ORAL
Qty: 1 TABLET | Refills: 0 | Status: SHIPPED | OUTPATIENT
Start: 2023-05-09

## 2023-05-09 NOTE — PROGRESS NOTES
Assessment:   Diagnosis ICD-10-CM Associated Orders   1  Patellofemoral pain syndrome of left knee  M22 2X2 Ambulatory Referral to Physical Therapy      2  It band syndrome, left  M76 32 Ambulatory Referral to Physical Therapy      3  Primary osteoarthritis of left knee  M17 12 Ambulatory Referral to Physical Therapy          Plan:  • She may be weightbearing as tolerated  • Continue physical therapy plan with the addition of VMO strengthening, stretching of IT band, modalities to distal IT band  • Continue with diabetes management   • Continue with over the counter oral analgesics   • Discussed plan with patient and she is in agreement with treatment plan  Thank you    To do next visit:  Return in about 4 months (around 9/9/2023) for Recheck  The above stated was discussed in layman's terms and the patient expressed understanding  All questions were answered to the patient's satisfaction  Scribe Attestation    I,:  Zeenat Abdi am acting as a scribe while in the presence of the attending physician :       I,:  Bree Alfonso MD personally performed the services described in this documentation    as scribed in my presence :             Subjective:   Lalito Watson is a 50 y o  female who presents for initial evaluation of left knee pain present since March with no specific injury  The patient is referred by Evelio Angulo for left knee osteoarthritis status post Toradol injection on 2/20/23 with no relief in symptoms  She has been attending physical therapy since mid March with mild improvement in symptoms  She has been performing her home exercise plan  She notes her pain comes and goes  She does have stiffness after sitting for prolonged periods of time  Today she I not experiencing any pain  In 2019 she received viscosupplementation without relief in symptoms  She has no prior surgeries on this knee  She denies any locking, catching or instability   She takes ibuprofen as needed for symptoms with mild relief in symptoms  She takes ibuprofen roughly once per week  Review of systems negative unless otherwise specified in HPI  Review of Systems   Constitutional: Negative for appetite change and unexpected weight change  HENT: Negative for congestion and trouble swallowing  Eyes: Negative for visual disturbance  Respiratory: Negative for cough and shortness of breath  Cardiovascular: Negative for chest pain and palpitations  Gastrointestinal: Negative for nausea and vomiting  Endocrine: Negative for cold intolerance and heat intolerance  Musculoskeletal: Positive for arthralgias  Negative for joint swelling and myalgias  Skin: Negative for rash  Neurological: Negative for numbness         Past Medical History:   Diagnosis Date   • Arthritis    • Benign essential hypertension     Resolved 8/16/2016    • Diabetes mellitus ([facility] [address] )    • Diabetes mellitus type 2, controlled ([facility] [address] )    • Dyspnea and respiratory abnormality     Resolved 2/9/2015    • Headache(784 0)    • Migraine    • Varicella    • Vertigo        Past Surgical History:   Procedure Laterality Date   • HAND SURGERY  09/2014   • KNEE ARTHROPLASTY Right    • WISDOM TOOTH EXTRACTION         Family History   Problem Relation Age of Onset   • Diabetes unspecified Mother    • Diabetes Mother    • Lupus Mother    • Diabetes type II Mother    • Cancer Mother    • Diabetes unspecified Sister    • Diabetes unspecified Paternal Grandfather    • Arrhythmia Paternal Grandfather    • Heart attack Paternal Grandfather    • Heart disease Paternal Grandfather    • Diabetes Sister    • Cancer Paternal Grandmother    • Breast cancer Paternal Grandmother         50-60s   • Heart murmur Father    • Other Father         Right leg amputation    • Cancer Maternal Grandmother         Skin, also maybe breast    • No Known Problems Maternal Grandfather        Social History     Occupational History   • Not on file   Tobacco Use   • Smoking status: Never   • Smokeless tobacco: Never   Vaping Use   • Vaping Use: Never used   Substance and Sexual Activity   • Alcohol use: Yes     Comment: Once in a blue moon   • Drug use: No   • Sexual activity: Yes     Partners: Male     Birth control/protection: None         Current Outpatient Medications:   •  atorvastatin (LIPITOR) 10 mg tablet, Take 1 tablet (10 mg total) by mouth daily, Disp: 90 tablet, Rfl: 1  •  Continuous Blood Gluc Sensor (FreeStyle Shelly 2 Sensor) MISC, Change every 14 days, Disp: 6 each, Rfl: 1  •  lisinopril (ZESTRIL) 10 mg tablet, Take 1 tablet (10 mg total) by mouth daily For blood pressure, Disp: 90 tablet, Rfl: 1  •  BD Pen Needle Sindhu 2nd Gen 32G X 4 MM MISC, USE ONE PEN NEEDLE DAILY, Disp: 100 each, Rfl: 6  •  Continuous Blood Gluc  (FreeStyle Shelly 2 [place]) BLANCHE, Use 4 times a day, Disp: 1 each, Rfl: 0  •  dulaglutide (Trulicity) 3 LA/4 3MO injection, Inject 3 mg weekly, Disp: 6 mL, Rfl: 1  •  glucose blood (Contour Next Test) test strip, Use 1 each 2 (two) times a day Use as instructed, Disp: 60 each, Rfl: 6  •  insulin glargine (Toujeo SoloStar) 300 units/mL CONCENTRATED U-300 injection pen (1-unit dial), Inject 16 Units under the skin daily, Disp: 5 mL, Rfl: 3  •  meclizine (ANTIVERT) 12 5 MG tablet, Take 1 tablet (12 5 mg total) by mouth every 8 (eight) hours as needed for dizziness, Disp: 30 tablet, Rfl: 0  •  meloxicam (MOBIC) 15 mg tablet, Take 1 tablet (15 mg total) by mouth daily as needed NOT with naproxen, Disp: , Rfl:   •  meloxicam (Mobic) 15 mg tablet, Take 1 tablet (15 mg total) by mouth daily, Disp: 30 tablet, Rfl: 1  •  metFORMIN (GLUCOPHAGE) 1000 MG tablet, TAKE 1 TABLET TWICE A DAY WITH MEALS, Disp: 180 tablet, Rfl: 3  •  Microlet Lancets MISC, Test blood glucose twice daily  , Disp: 100 each, Rfl: 2  •  naproxen (NAPROSYN) 500 mg tablet, Take 1 tablet (500 mg total) by mouth daily as needed for moderate pain (not with meloxicam), Disp: , Rfl:     Allergies   Allergen "Reactions   • Hydrocodone-Acetaminophen      Vicodin--Other reaction(s): BREATHING WAS FUNNY  Tingling sensation in mouth            Vitals:    05/09/23 0740   BP: 140/92   Pulse: 71       Objective:                    Ortho Exam   Left Knee  Range of motion from 0 to 120  There is no crepitus with range of motion  There is no effusion  There is tenderness over the lateral joint line, distal IT band  There is 5/5 quadriceps strength  The patient is able to perform a straight leg raise  negative Lachman Test    Posterior drawer test is negative   Varus stress testing reveals no instability at 0 and 30 degrees   Valgus stress testing reveals no instability at 0 and 30 degrees  The patient is neurovascular intact distally  Diagnostics, reviewed and taken today if performed as documented: The attending physician has personally reviewed the pertinent films in PACS and interpretation is as follows:    X-rays taken 2/20/23 of Left knee demonstrates mild degenerative changes of the tibiofemoral joint space with osteophyte formation  Procedures, if performed today:    Procedures    None performed      Portions of the record may have been created with voice recognition software  Occasional wrong word or \"sound a like\" substitutions may have occurred due to the inherent limitations of voice recognition software  Read the chart carefully and recognize, using context, where substitutions have occurred    "

## 2023-05-09 NOTE — PROGRESS NOTES
Follow-up Patient Progress Note      CC: Type 2 diabetes, morbid obesity    History of Present Illness:   14-year-old female with type 2 diabetes, HTN, HLD, vitamin D deficiency, morbid obesity BMI 43, DJD, Hirsutism, chronic migraines, suspected ALVARADO and Rt ankle bone spur  Last visit was 12/14/2020 at [facility] office  Since last visit, weight is same  Menstrual history : LMP was 4/21/23  Appears irregular  Never had pregnancy  CGM data review[de-identified]  Device: Stellar  Dates: 4/26/23 - 5/9/23 Usage: 55 % Av glu: 151 mg/dL  SD:  mg/dL CV: 20  %  TIR: 86 % TAR: 14+0 % TBR: 0  %    Glycemic patters:  Mostly normoglycemia but not enough scanning for data  Hypoglycemia: No    Current meds: Reported side effects with Jardiance    Trulicity 3 mg weekly  Metformin 1000 mg p o  twice daily  Toujeo 16 units nightly    Opthamology: Yes  Podiatry: 10/22  vaccination:   Dental:  Pancreatitis:     Ace/ARB: Lisinopril 10 mg daily  Statin: Lipitor 10 mg nightly  Thyroid issues: No    Patient Active Problem List   Diagnosis   • Type 2 diabetes mellitus with hyperglycemia, without long-term current use of insulin (HCC)   • Hyperlipidemia   • Mild essential hypertension   • Elevated TSH   • Right wrist tendonitis   • Class 3 severe obesity due to excess calories with serious comorbidity and body mass index (BMI) of 40 0 to 44 9 in adult [facility])   • Primary osteoarthritis of left knee   • Migraines     Past Medical History:   Diagnosis Date   • Arthritis    • Benign essential hypertension     Resolved 8/16/2016    • Diabetes mellitus ([address] )    • Diabetes mellitus type 2, controlled ([address] )    • Dyspnea and respiratory abnormality     Resolved 2/9/2015    • Headache(784 0)    • Migraine    • Varicella    • Vertigo       Past Surgical History:   Procedure Laterality Date   • HAND SURGERY  09/2014   • KNEE ARTHROPLASTY Right    • WISDOM TOOTH EXTRACTION        Family History   Problem Relation Age of Onset   • Diabetes unspecified Mother    • Diabetes Mother    • Lupus Mother    • Diabetes type II Mother    • Cancer Mother    • Diabetes unspecified Sister    • Diabetes unspecified Paternal Grandfather    • Arrhythmia Paternal Grandfather    • Heart attack Paternal Grandfather    • Heart disease Paternal Grandfather    • Diabetes Sister    • Cancer Paternal Grandmother    • Breast cancer Paternal Grandmother         50-60s   • Heart murmur Father    • Other Father         Right leg amputation    • Cancer Maternal Grandmother         Skin, also maybe breast    • No Known Problems Maternal Grandfather      Social History     Tobacco Use   • Smoking status: Never   • Smokeless tobacco: Never   Substance Use Topics   • Alcohol use: Yes     Comment: Once in a blue moon     Allergies   Allergen Reactions   • Hydrocodone-Acetaminophen      Vicodin--Other reaction(s): BREATHING WAS FUNNY  Tingling sensation in mouth         Current Outpatient Medications:   •  atorvastatin (LIPITOR) 10 mg tablet, Take 1 tablet (10 mg total) by mouth daily, Disp: 90 tablet, Rfl: 1  •  BD Pen Needle Sindhu 2nd Gen 32G X 4 MM MISC, USE ONE PEN NEEDLE DAILY, Disp: 100 each, Rfl: 6  •  Continuous Blood Gluc  (FreeStyle Shelly 2 [place]) BLANCHE, Use 4 times a day, Disp: 1 each, Rfl: 0  •  Continuous Blood Gluc Sensor (FreeStyle Shelly 2 Sensor) MISC, Change every 14 days, Disp: 6 each, Rfl: 1  •  dulaglutide (Trulicity) 3 TY/8 8GE injection, Inject 3 mg weekly, Disp: 6 mL, Rfl: 1  •  insulin glargine (Toujeo SoloStar) 300 units/mL CONCENTRATED U-300 injection pen (1-unit dial), Inject 16 Units under the skin daily, Disp: 5 mL, Rfl: 3  •  lisinopril (ZESTRIL) 10 mg tablet, Take 1 tablet (10 mg total) by mouth daily For blood pressure, Disp: 90 tablet, Rfl: 1  •  meclizine (ANTIVERT) 12 5 MG tablet, Take 1 tablet (12 5 mg total) by mouth every 8 (eight) hours as needed for dizziness, Disp: 30 tablet, Rfl: 0  •  meloxicam (Mobic) 15 mg tablet, Take 1 tablet (15 mg total) by "mouth daily, Disp: 30 tablet, Rfl: 1  •  metFORMIN (GLUCOPHAGE) 1000 MG tablet, TAKE 1 TABLET TWICE A DAY WITH MEALS, Disp: 180 tablet, Rfl: 3  •  naproxen (NAPROSYN) 500 mg tablet, Take 1 tablet (500 mg total) by mouth daily as needed for moderate pain (not with meloxicam), Disp: , Rfl:   •  glucose blood (Contour Next Test) test strip, Use 1 each 2 (two) times a day Use as instructed, Disp: 60 each, Rfl: 6  •  meloxicam (MOBIC) 15 mg tablet, Take 1 tablet (15 mg total) by mouth daily as needed NOT with naproxen, Disp: , Rfl:   •  Microlet Lancets MISC, Test blood glucose twice daily  , Disp: 100 each, Rfl: 2    Review of Systems   HENT: Negative  Eyes: Negative  Respiratory: Negative  Cardiovascular: Negative  Gastrointestinal: Negative  Endocrine: Negative  Musculoskeletal: Negative  Skin: Negative  Allergic/Immunologic: Negative  Neurological: Negative  Hematological: Negative  Psychiatric/Behavioral: Negative  Physical Exam:  Body mass index is 43 18 kg/m²  /82 (BP Location: Left arm, Patient Position: Sitting, Cuff Size: Large)   Pulse 81   Temp (!) 97 4 °F (36 3 °C) (Tympanic)   Ht 5' 8\" (1 727 m)   Wt 129 kg (284 lb)   SpO2 99%   BMI 43 18 kg/m²    Vitals:    05/09/23 0940   Weight: 129 kg (284 lb)        Physical Exam  Constitutional:       General: She is not in acute distress  Appearance: She is well-developed  She is not ill-appearing  HENT:      Head: Normocephalic and atraumatic  Comments: Facial hair under the skin and upper lip     Nose: Nose normal       Mouth/Throat:      Pharynx: Oropharynx is clear  Eyes:      Extraocular Movements: Extraocular movements intact  Conjunctiva/sclera: Conjunctivae normal    Neck:      Thyroid: No thyromegaly  Cardiovascular:      Rate and Rhythm: Normal rate  Pulmonary:      Effort: Pulmonary effort is normal    Musculoskeletal:         General: No deformity        Cervical back: Normal range " of motion  Skin:     Capillary Refill: Capillary refill takes less than 2 seconds  Coloration: Skin is not pale  Findings: No rash  Neurological:      Mental Status: She is alert and oriented to person, place, and time  Psychiatric:         Behavior: Behavior normal          Labs:   Lab Results   Component Value Date    HGBA1C 9 4 (H) 03/17/2023       Lab Results   Component Value Date    [id number] 2 293 10/26/2022       Lab Results   Component Value Date    CREATININE 0 86 12/08/2022    CREATININE 0 61 10/26/2022    CREATININE 0 69 05/22/2021    BUN 13 12/08/2022    K 4 1 12/08/2022     12/08/2022    CO2 20 (L) 12/08/2022     eGFR   Date Value Ref Range Status   12/08/2022 80 ml/min/1 73sq m Final       Lab Results   Component Value Date    ALT 29 12/08/2022    AST 11 12/08/2022    ALKPHOS 81 12/08/2022       Lab Results   Component Value Date    CHOLESTEROL 118 10/26/2022    CHOLESTEROL 113 05/22/2021    CHOLESTEROL 109 08/15/2020     Lab Results   Component Value Date    HDL 42 (L) 10/26/2022    HDL 36 (L) 05/22/2021    HDL 38 (L) 08/15/2020     Lab Results   Component Value Date    TRIG 96 10/26/2022    TRIG 130 05/22/2021    TRIG 114 08/15/2020     Lab Results   Component Value Date    [place] 76 10/26/2022         Impression:  1  Type 2 diabetes mellitus with hyperglycemia, without long-term current use of insulin ([address] )    2  Mixed hyperlipidemia    3  Class 3 severe obesity due to excess calories with serious comorbidity and body mass index (BMI) of 40 0 to 44 9 in adult (HCC)    4  Elevated TSH    5  Hirsutism    6  Vitamin D deficiency         Plan:    Diagnoses and all orders for this visit:    Type 2 diabetes mellitus with hyperglycemia, without long-term current use of insulin ([address] )  She seems uncontrolled with an A1c of 9 4% recently  Goal is less than 7%    She has significant improvement in the last few weeks based on AGP with TIR 86% but only 55% of the data was available  Today we discussed all aspects of diabetes including pathophysiology, risk factors, complications, SAGM, CGM, diet, lifestyle modifications, medical fitness training, diabetes education, goals of therapy, follow up needs and medications including insulin, metformin and GLP1 agonists  Advised to maintain goal blood sugars 70-180mg/dL  Over the last few weeks, she has made diet and lifestyle changes with significant improvement in her reported glucose logs  For now I suggested to continue improving diet and lifestyle, start medical fitness training and continue current Trulicity 3 mg weekly, metformin 1000 mg p o  twice daily and Toujeo 16 units nightly  Over next visits, will aim to increase Trulicity and reduce basal insulin as possible  Advised to get a sleep study as well as 1 mg dexamethasone suppression test to rule out underlying hyper cortisol state  Since she has hirsutism, will also rule out hyperandrogenism contributing to insulin resistance  Follow-up in 6 weeks with repeat data as listed below  If the CGM data looks okay at that time, she would be acceptable risk to proceed to surgery for her right foot bone spur     -     Hemoglobin A1C; Future  -     Albumin / creatinine urine ratio; Future    Mixed hyperlipidemia  Continue Lipitor  Class 3 severe obesity due to excess calories with serious comorbidity and body mass index (BMI) of 40 0 to 44 9 in adult [facility])  Today we discussed all aspects of obesity and metabolism including pathophysiology, risk factors, complications, goal of sustaining weight loss long term, usual propensity to regain weight with short term approaches, follow up needs and medications - efficacy and limitations  Discussed role of endocrinopathies, inflammatory conditions, sleep disorders, mental health disorders, lifestyle issues and polypharmacy and weight gain  Briefly discussed bariatric surgery    Diet: carb controlled diet <1500cal/day/ VLCD, 64oz fluids/day   lifestyle modifications: intermittent fasting and 10,000 steps/day  medical fitness training: muscle building  education: nutrition input    -     Ambulatory referral to Sleep Medicine; Future  -     Ambulatory Referral to Medical Fitness Exercise Specialist; Future    Elevated TSH  -     TSH, 3rd generation; Future  -     T4, free; Future    Hirsutism  -     Testosterone, free, total; Future  -     Androstenedione; Future  -     Cortisol Level, AM Specimen; Future  -     dexamethasone (DECADRON) 1 mg tablet; Take 1 tab at 11pm and get cortisol levels checked between 7:00 a m  and 9:00 a m  on the next morning  Vitamin D deficiency  Advised vitamin D3 5000 IU daily OTC   -     Vitamin D 25 hydroxy; Future        I have spent 45 minutes with patient today in which greater than 50% of this time was spent in counseling/coordination of care including review of her overall metabolic health, review of multiple new medical issues that were diagnosed today which is her first visit with me  Discussed with the patient and all questioned fully answered  She will call me if any problems arise  Educated/ Counseled patient on diagnostic test results, prognosis, risk vs benefit of treatment options, importance of treatment compliance, healthy life and lifestyle choices        [address]

## 2023-05-10 ENCOUNTER — OFFICE VISIT (OUTPATIENT)
Dept: PHYSICAL THERAPY | Facility: CLINIC | Age: 48
End: 2023-05-10

## 2023-05-10 DIAGNOSIS — M25.562 ACUTE PAIN OF LEFT KNEE: Primary | ICD-10-CM

## 2023-05-10 DIAGNOSIS — M17.12 PRIMARY OSTEOARTHRITIS OF LEFT KNEE: ICD-10-CM

## 2023-05-10 NOTE — PROGRESS NOTES
"Daily Note     Today's date: 5/10/2023  Patient name: Yesy Pinon  : 1975  MRN: [id number]  Referring provider: Harlon Favre  Dx:   Encounter Diagnosis     ICD-10-CM    1  Acute pain of left knee  M25 562       2  Primary osteoarthritis of left knee  M17 12                      Subjective: Pt reports 5/10 posterior > anterior L knee pain at worst with STS  Objective: See treatment diary below  Added L ITB foam roller per MD    Assessment: Pt demonstrated significantly improved pain with STS and heel slides following L ITB foam roller  Plan: Cont  POC  Precautions:  PMHx: DM II, obesity, R knee scope, HTN, migraines  Dx: L PF OA    Manuals 3/16 3/22 3/29 4/5 4/19 5/10       Gr IV medial, inferior patellar mobs  1x75 ea 1x100 ea 1x100 ea  1x100 ea       L ITB foam roller      6 min       L knee flexion PROM  10\"x5 10\"x6 4 min 4 min        Laser L PF joint  4000J 5000J 6000J 6000J                     Neuro Re-Ed                                                                                                        Ther Ex             slides 5\"x10 5\"x15 5\"x15 5\"x20 5\"x20        Supine HA stretch 15\"x3 15\"x3 15\"x3 15\"x3 15\"x3 15\"x3       Supine L glute stretch      15\"x3       Prone L quad stretch      15\"x3       Standing GA stretch  15\"xx3 15\"x3 15\"x3 15\"x3        SLR 2x10 3x10 3x10 3x12 3x12 3x15       SL hip ABD 2x10 3x10 3x10 3x12 3x12 3x15       clamshells  R/  3x10 R/  3x10 R/  3x10 R/   3x12 R/  3x15                                 Ther Activity             STS 1x5 1x10 1x5 1x5 1x5  1x3 2x10                    Gait Training                                       Modalities                                            "

## 2023-05-16 ENCOUNTER — FITNESS (OUTPATIENT)
Age: 48
End: 2023-05-16

## 2023-05-16 DIAGNOSIS — E66.01 CLASS 3 SEVERE OBESITY DUE TO EXCESS CALORIES WITH SERIOUS COMORBIDITY AND BODY MASS INDEX (BMI) OF 40.0 TO 44.9 IN ADULT (HCC): ICD-10-CM

## 2023-05-17 ENCOUNTER — APPOINTMENT (OUTPATIENT)
Dept: PHYSICAL THERAPY | Facility: CLINIC | Age: 48
End: 2023-05-17
Payer: COMMERCIAL

## 2023-05-17 NOTE — PROGRESS NOTES
Fitness Plan of Care    Exercise session completed: 5-16-23  Referring Provider: Colletta Driver  Referring Clinician: Ziggy  Diagnosis: obesity    Risk Factors  Cholesterol: yes  Smoking: no  HTN: yes  DM: yes  Obesity: yes  Inactivity: yes  Stress: no    Summary of progress evaluation     Exercise assessment and plan  LMA Initial Score: 36  LMA Final Score:     Exercise Prescription  Cardiovascular  Frequency: 2x  Minutes of Exercise: 45  Mets: 5  HR: 120  Modalities: treadmill    Strength Training  Frequency: 2x  Repetitions: 10  Sets: 3  Modalities: free weights, and machines    Home Exercise:  Physical Limitations: no  Fall Risk: no  Comments: no  Client specific Goals: no    Nutrition Assessment and Plan  LMA Initial Score: 6  LMA Final Score:     Weight PRE: 277 lb 6 4 oz (126 kg)     Body fat percentage PRE: 47 8     BMI PRE: 42 2       Goals for dietary modification:

## 2023-05-20 ENCOUNTER — APPOINTMENT (OUTPATIENT)
Dept: LAB | Facility: CLINIC | Age: 48
End: 2023-05-20

## 2023-05-20 DIAGNOSIS — L68.0 HIRSUTISM: ICD-10-CM

## 2023-05-20 LAB — CORTIS AM PEAK SERPL-MCNC: 0.6 UG/DL (ref 6.7–22.6)

## 2023-05-22 LAB
TESTOST FREE SERPL-MCNC: <0.2 PG/ML (ref 0–4.2)
TESTOST SERPL-MCNC: 3 NG/DL (ref 4–50)

## 2023-05-24 ENCOUNTER — OFFICE VISIT (OUTPATIENT)
Dept: PHYSICAL THERAPY | Facility: CLINIC | Age: 48
End: 2023-05-24

## 2023-05-24 DIAGNOSIS — M25.562 ACUTE PAIN OF LEFT KNEE: Primary | ICD-10-CM

## 2023-05-24 DIAGNOSIS — M17.12 PRIMARY OSTEOARTHRITIS OF LEFT KNEE: ICD-10-CM

## 2023-05-24 NOTE — PROGRESS NOTES
"Daily Note     Today's date: 2023  Patient name: Ann Cao  : 1975  MRN: [id number]  Referring provider: Cristopher Cuba  Dx:   Encounter Diagnosis     ICD-10-CM    1  Acute pain of left knee  M25 562       2  Primary osteoarthritis of left knee  M17 12                      Subjective: Pt reports feeling muscle soreness in her legs after working out at the gym yesterday  Pt has no complaints of knee pain currently  Objective: See treatment diary below    Assessment: Pt demonstrated moderate adhesions in L ITB, increased tolerance to glute strengthening  Pt demonstrated less knee pain with STS  Plan: Cont  POC  Pt can progress OKC strengthening nv  Precautions:  PMHx: DM II, obesity, R knee scope, HTN, migraines  Dx: L PF OA    Manuals 3/16 3/22 3/29 4/5 4/19 5/10 5/24      Gr IV medial, inferior patellar mobs  1x75 ea 1x100 ea 1x100 ea  1x100 ea       L ITB foam roller      6 min 8 min      L knee flexion PROM  10\"x5 10\"x6 4 min 4 min        Laser L PF joint  4000J 5000J 6000J 6000J                     Neuro Re-Ed                                                                                                        Ther Ex             slides 5\"x10 5\"x15 5\"x15 5\"x20 5\"x20        Supine HA stretch 15\"x3 15\"x3 15\"x3 15\"x3 15\"x3 15\"x3 15\"x3      Supine L glute stretch      15\"x3 15\"x3      Prone L quad stretch      15\"x3 15\"x3      Standing GA stretch  15\"xx3 15\"x3 15\"x3 15\"x3        SLR 2x10 3x10 3x10 3x12 3x12 3x15 3x15      SL hip ABD 2x10 3x10 3x10 3x12 3x12 3x15 3x15      clamshells  R/  3x10 R/  3x10 R/  3x10 R/   3x12 R/  3x15 G/ 3x15                                Ther Activity             STS 1x5 1x10 1x5 1x5 1x5  1x3 2x10 2x10                   Gait Training                                       Modalities                                            "

## 2023-05-26 LAB — ANDROST SERPL-MCNC: 19 NG/DL (ref 41–262)

## 2023-05-31 ENCOUNTER — APPOINTMENT (OUTPATIENT)
Dept: PHYSICAL THERAPY | Facility: CLINIC | Age: 48
End: 2023-05-31
Payer: COMMERCIAL

## 2023-06-03 ENCOUNTER — APPOINTMENT (OUTPATIENT)
Dept: LAB | Facility: CLINIC | Age: 48
End: 2023-06-03
Payer: COMMERCIAL

## 2023-06-03 DIAGNOSIS — E11.65 TYPE 2 DIABETES MELLITUS WITH HYPERGLYCEMIA, WITHOUT LONG-TERM CURRENT USE OF INSULIN (HCC): ICD-10-CM

## 2023-06-03 DIAGNOSIS — R79.89 ELEVATED TSH: ICD-10-CM

## 2023-06-03 DIAGNOSIS — E55.9 VITAMIN D DEFICIENCY: ICD-10-CM

## 2023-06-03 LAB
25(OH)D3 SERPL-MCNC: 14.3 NG/ML (ref 30–100)
CREAT UR-MCNC: 131 MG/DL
EST. AVERAGE GLUCOSE BLD GHB EST-MCNC: 169 MG/DL
HBA1C MFR BLD: 7.5 %
MICROALBUMIN UR-MCNC: 13.3 MG/L (ref 0–20)
MICROALBUMIN/CREAT 24H UR: 10 MG/G CREATININE (ref 0–30)
T4 FREE SERPL-MCNC: 0.92 NG/DL (ref 0.61–1.12)
TSH SERPL DL<=0.05 MIU/L-ACNC: 2.45 UIU/ML (ref 0.45–4.5)

## 2023-06-03 PROCEDURE — 82570 ASSAY OF URINE CREATININE: CPT

## 2023-06-03 PROCEDURE — 36415 COLL VENOUS BLD VENIPUNCTURE: CPT

## 2023-06-03 PROCEDURE — 83036 HEMOGLOBIN GLYCOSYLATED A1C: CPT

## 2023-06-03 PROCEDURE — 82043 UR ALBUMIN QUANTITATIVE: CPT

## 2023-06-03 PROCEDURE — 84439 ASSAY OF FREE THYROXINE: CPT

## 2023-06-03 PROCEDURE — 82306 VITAMIN D 25 HYDROXY: CPT

## 2023-06-03 PROCEDURE — 84443 ASSAY THYROID STIM HORMONE: CPT

## 2023-06-07 ENCOUNTER — OFFICE VISIT (OUTPATIENT)
Dept: PHYSICAL THERAPY | Facility: CLINIC | Age: 48
End: 2023-06-07
Payer: COMMERCIAL

## 2023-06-07 DIAGNOSIS — M17.12 PRIMARY OSTEOARTHRITIS OF LEFT KNEE: ICD-10-CM

## 2023-06-07 DIAGNOSIS — M25.562 ACUTE PAIN OF LEFT KNEE: Primary | ICD-10-CM

## 2023-06-07 PROCEDURE — 97140 MANUAL THERAPY 1/> REGIONS: CPT

## 2023-06-07 PROCEDURE — 97110 THERAPEUTIC EXERCISES: CPT

## 2023-06-07 NOTE — PROGRESS NOTES
"Daily Note     Today's date: 2023  Patient name: Nikita Mathias  : 1975  MRN: [id number]  Referring provider: Andres Fierro  Dx:   Encounter Diagnosis     ICD-10-CM    1  Acute pain of left knee  M25 562       2  Primary osteoarthritis of left knee  M17 12                      Subjective: Pt reports very mild left knee pain but it has been much better  Objective: See treatment diary below    Assessment: Pt demonstrated decreased L ITB adhesions and increased tolerance to WB strengthening  Mild knee valgus noted with STS  Plan: Cont  POC  Precautions:  PMHx: DM II, obesity, R knee scope, HTN, migraines  Dx: L PF OA    Manuals 3/16 3/22 3/29 4/5 4/19 5/10 5/24 6/7     Gr IV medial, inferior patellar mobs  1x75 ea 1x100 ea 1x100 ea  1x100 ea       L ITB foam roller      6 min 8 min 8 min     L knee flexion PROM  10\"x5 10\"x6 4 min 4 min        Laser L PF joint  4000J 5000J 6000J 6000J                     Neuro Re-Ed                                                                                                        Ther Ex             slides 5\"x10 5\"x15 5\"x15 5\"x20 5\"x20        Supine HA stretch 15\"x3 15\"x3 15\"x3 15\"x3 15\"x3 15\"x3 15\"x3 15\"x3     Supine L glute stretch      15\"x3 15\"x3 15\"x3     Prone L quad stretch      15\"x3 15\"x3 15\"x3     Standing GA stretch  15\"xx3 15\"x3 15\"x3 15\"x3        SLR 2x10 3x10 3x10 3x12 3x12 3x15 3x15 3x18     SL hip ABD 2x10 3x10 3x10 3x12 3x12 3x15 3x15 3x18     clamshells  R/  3x10 R/  3x10 R/  3x10 R/   3x12 R/  3x15 G/ 3x15 G/  3x15                               Ther Activity             STS 1x5 1x10 1x5 1x5 1x5  1x3 2x10 2x10 3x10                  Gait Training                                       Modalities                                            "

## 2023-06-14 ENCOUNTER — OFFICE VISIT (OUTPATIENT)
Dept: PHYSICAL THERAPY | Facility: CLINIC | Age: 48
End: 2023-06-14
Payer: COMMERCIAL

## 2023-06-14 DIAGNOSIS — M17.12 PRIMARY OSTEOARTHRITIS OF LEFT KNEE: ICD-10-CM

## 2023-06-14 DIAGNOSIS — M25.562 ACUTE PAIN OF LEFT KNEE: Primary | ICD-10-CM

## 2023-06-14 PROCEDURE — 97140 MANUAL THERAPY 1/> REGIONS: CPT

## 2023-06-14 NOTE — PROGRESS NOTES
"Daily Note     Today's date: 2023  Patient name: Nikita Mathias  : 1975  MRN: [id number]  Referring provider: Andres Fierro  Dx:   Encounter Diagnosis     ICD-10-CM    1  Acute pain of left knee  M25 562       2  Primary osteoarthritis of left knee  M17 12                      Subjective: Pt reports 9/10 left knee pain after catching herself and twisting her knee earlier today  Objective: See treatment diary below    Assessment: Held WB strengthening and decreased intensity of TE program secondary to high pain levels  Pt demonstrated fair tolerance to hip strengthening but no increased pain following treatment  Plan: Cont  POC  Resume previous TE intensity as pain levels allow  Precautions:  PMHx: DM II, obesity, R knee scope, HTN, migraines  Dx: L PF OA    Manuals 3/16 3/22 3/29 4/5 4/19 5/10 5/24 6/7 6/14    Gr IV medial, inferior patellar mobs  1x75 ea 1x100 ea 1x100 ea  1x100 ea       L ITB foam roller      6 min 8 min 8 min 8 min    L knee flexion PROM  10\"x5 10\"x6 4 min 4 min        Laser L PF joint  4000J 5000J 6000J 6000J                     Neuro Re-Ed                                                                                                        Ther Ex             slides 5\"x10 5\"x15 5\"x15 5\"x20 5\"x20        Supine HA stretch 15\"x3 15\"x3 15\"x3 15\"x3 15\"x3 15\"x3 15\"x3 15\"x3 15\"x3    Supine L glute stretch      15\"x3 15\"x3 15\"x3 15\"x3    Prone L quad stretch      15\"x3 15\"x3 15\"x3 15\"x3    Standing GA stretch  15\"xx3 15\"x3 15\"x3 15\"x3        SLR 2x10 3x10 3x10 3x12 3x12 3x15 3x15 3x18 3x10    SL hip ABD 2x10 3x10 3x10 3x12 3x12 3x15 3x15 3x18 2x15    clamshells  R/  3x10 R/  3x10 R/  3x10 R/   3x12 R/  3x15 G/ 3x15 G/  3x15 G/  3x10                              Ther Activity             STS 1x5 1x10 1x5 1x5 1x5  1x3 2x10 2x10 3x10 held                 Gait Training                                       Modalities                                            "

## 2023-06-21 ENCOUNTER — OFFICE VISIT (OUTPATIENT)
Dept: PHYSICAL THERAPY | Facility: CLINIC | Age: 48
End: 2023-06-21
Payer: COMMERCIAL

## 2023-06-21 DIAGNOSIS — M25.562 ACUTE PAIN OF LEFT KNEE: Primary | ICD-10-CM

## 2023-06-21 DIAGNOSIS — M17.12 PRIMARY OSTEOARTHRITIS OF LEFT KNEE: ICD-10-CM

## 2023-06-21 PROCEDURE — 97110 THERAPEUTIC EXERCISES: CPT | Performed by: PHYSICAL THERAPIST

## 2023-06-21 PROCEDURE — 97140 MANUAL THERAPY 1/> REGIONS: CPT | Performed by: PHYSICAL THERAPIST

## 2023-06-21 NOTE — PROGRESS NOTES
"Daily Note     Today's date: 2023  Patient name: Ivan Draper  : 1975  MRN: [id number]  Referring provider: Crys Benito  Dx:   Encounter Diagnosis     ICD-10-CM    1  Acute pain of left knee  M25 562       2  Primary osteoarthritis of left knee  M17 12                      Subjective: Pt reports no L knee pain over the past couple days but it still feels \"awkward\" since hurting it prior to last visit  She denies giving-way or other mechanical symptoms  Objective: See treatment diary below  (-) lachman, ant drawer, post drawer    Assessment: Pt demonstrated improved L ITB mobility but still with limited STS tolerance  Plan: Cont  POC  Precautions:  PMHx: DM II, obesity, R knee scope, HTN, migraines  Dx: L PF OA    Manuals 3/16 3/22 3/29 4/5 4/19 5/10 5/24 6/7 6/14 6/21   Gr IV medial, inferior patellar mobs  1x75 ea 1x100 ea 1x100 ea  1x100 ea    1x100 ea   L ITB foam roller      6 min 8 min 8 min 8 min 7 min   L knee flexion PROM  10\"x5 10\"x6 4 min 4 min        Laser L PF joint  4000J 5000J 6000J 6000J                     Neuro Re-Ed                                                                                                        Ther Ex             slides 5\"x10 5\"x15 5\"x15 5\"x20 5\"x20        Supine HA stretch 15\"x3 15\"x3 15\"x3 15\"x3 15\"x3 15\"x3 15\"x3 15\"x3 15\"x3 20\"x3   Supine L glute stretch      15\"x3 15\"x3 15\"x3 15\"x3 20\"x3   Prone L quad stretch      15\"x3 15\"x3 15\"x3 15\"x3 20\"x3   Standing GA stretch  15\"xx3 15\"x3 15\"x3 15\"x3        SLR 2x10 3x10 3x10 3x12 3x12 3x15 3x15 3x18 3x10 3x15   SL hip ABD 2x10 3x10 3x10 3x12 3x12 3x15 3x15 3x18 2x15 3x15   clamshells  R/  3x10 R/  3x10 R/  3x10 R/   3x12 R/  3x15 G/ 3x15 G/  3x15 G/  3x10 G/  3x15                             Ther Activity             STS 1x5 1x10 1x5 1x5 1x5  1x3 2x10 2x10 3x10 held 1x10  (3x10 nv)   Step-ups          (nv)  6\"/  1x10   Step-downs          (nv)  6\"/  1x10                Gait Training           " Modalities

## 2023-06-27 ENCOUNTER — OFFICE VISIT (OUTPATIENT)
Dept: ENDOCRINOLOGY | Facility: CLINIC | Age: 48
End: 2023-06-27
Payer: COMMERCIAL

## 2023-06-27 VITALS
WEIGHT: 282 LBS | OXYGEN SATURATION: 97 % | HEIGHT: 68 IN | HEART RATE: 94 BPM | BODY MASS INDEX: 42.74 KG/M2 | TEMPERATURE: 98 F

## 2023-06-27 DIAGNOSIS — R79.89 ELEVATED TSH: ICD-10-CM

## 2023-06-27 DIAGNOSIS — E66.01 CLASS 3 SEVERE OBESITY DUE TO EXCESS CALORIES WITH SERIOUS COMORBIDITY AND BODY MASS INDEX (BMI) OF 40.0 TO 44.9 IN ADULT (HCC): ICD-10-CM

## 2023-06-27 DIAGNOSIS — E11.65 TYPE 2 DIABETES MELLITUS WITH HYPERGLYCEMIA, WITHOUT LONG-TERM CURRENT USE OF INSULIN (HCC): Primary | ICD-10-CM

## 2023-06-27 DIAGNOSIS — E78.2 MIXED HYPERLIPIDEMIA: ICD-10-CM

## 2023-06-27 PROCEDURE — 95251 CONT GLUC MNTR ANALYSIS I&R: CPT | Performed by: INTERNAL MEDICINE

## 2023-06-27 PROCEDURE — 99214 OFFICE O/P EST MOD 30 MIN: CPT | Performed by: INTERNAL MEDICINE

## 2023-06-27 RX ORDER — DULAGLUTIDE 4.5 MG/.5ML
4.5 INJECTION, SOLUTION SUBCUTANEOUS
Qty: 6 ML | Refills: 1 | Status: SHIPPED | OUTPATIENT
Start: 2023-06-27

## 2023-06-27 NOTE — PROGRESS NOTES
Follow-up Patient Progress Note      CC: Type 2 diabetes, morbid obesity     History of Present Illness:   19-year-old female with type 2 diabetes, HTN, HLD, vitamin D deficiency, morbid obesity BMI 43, DJD, Hirsutism, chronic migraines, suspected ALVARADO and Rt ankle bone spur  Last visit was 5/9/23      Since last visit, weight is same  She recently lost 2 of her grandparents leading to some increased stress eating  Menstrual history : LMP was 4/21/23  Appears irregular  Never had pregnancy      CGM data review[de-identified]  Device: Shelly   Dates: 6/14/23 - 6/26/23            Usage: 74 %   Av glu: 151 mg/dL                   SD:  mg/dL            CV: 20  %  TIR: 41 %        TAR: 59+0 %   TBR: 0  %     Glycemic patters:  Mostly hyperglycemia post dinner and overnight  Otherwise acceptable glycemia  Hypoglycemia: No     Current meds: Reported side effects with Jardiance    Trulicity 3 mg weekly  Metformin 1000 mg p o  twice daily  Toujeo 16 units nightly     Opthamology: Yes  Podiatry: 10/22  vaccination:   Dental:  Pancreatitis:      Ace/ARB: Lisinopril 10 mg daily  Statin: Lipitor 10 mg nightly  Thyroid issues: No       Patient Active Problem List   Diagnosis   • Type 2 diabetes mellitus with hyperglycemia, without long-term current use of insulin (HCC)   • Hyperlipidemia   • Mild essential hypertension   • Elevated TSH   • Right wrist tendonitis   • Class 3 severe obesity due to excess calories with serious comorbidity and body mass index (BMI) of 40 0 to 44 9 in [facility])   • Primary osteoarthritis of left knee   • Migraines     Past Medical History:   Diagnosis Date   • Arthritis    • Benign essential hypertension     Resolved 8/16/2016    • Diabetes mellitus ([facility] [address] )    • Diabetes mellitus type 2, controlled ([facility] [address] )    • Dyspnea and respiratory abnormality     Resolved 2/9/2015    • Headache(784 0)    • Migraine    • Varicella    • Vertigo       Past Surgical History:   Procedure Laterality Date   • HAND SURGERY 09/2014   • KNEE ARTHROPLASTY Right    • WISDOM TOOTH EXTRACTION        Family History   Problem Relation Age of Onset   • Diabetes unspecified Mother    • Diabetes Mother    • Lupus Mother    • Diabetes type II Mother    • Cancer Mother    • Diabetes unspecified Sister    • Diabetes unspecified Paternal Grandfather    • Arrhythmia Paternal Grandfather    • Heart attack Paternal Grandfather    • Heart disease Paternal Grandfather    • Diabetes Sister    • Cancer Paternal Grandmother    • Breast cancer Paternal Grandmother         50-60s   • Heart murmur Father    • Other Father         Right leg amputation    • Cancer Maternal Grandmother         Skin, also maybe breast    • No Known Problems Maternal Grandfather      Social History     Tobacco Use   • Smoking status: Never   • Smokeless tobacco: Never   Substance Use Topics   • Alcohol use: Yes     Comment: Once in a blue moon     Allergies   Allergen Reactions   • Hydrocodone-Acetaminophen      Vicodin--Other reaction(s): BREATHING WAS FUNNY  Tingling sensation in mouth         Current Outpatient Medications:   •  atorvastatin (LIPITOR) 10 mg tablet, Take 1 tablet (10 mg total) by mouth daily, Disp: 90 tablet, Rfl: 1  •  BD Pen Needle Sindhu 2nd Gen 32G X 4 MM MISC, USE ONE PEN NEEDLE DAILY, Disp: 100 each, Rfl: 6  •  Continuous Blood Gluc  (FreeStyle Shelly 2 [place]) BLANCHE, Use 4 times a day, Disp: 1 each, Rfl: 0  •  Continuous Blood Gluc Sensor (FreeStyle Shelly 2 Sensor) MISC, Change every 14 days, Disp: 6 each, Rfl: 1  •  dulaglutide (Trulicity) 3 ZT/2 9HG injection, Inject 3 mg weekly, Disp: 6 mL, Rfl: 1  •  insulin glargine (Toujeo SoloStar) 300 units/mL CONCENTRATED U-300 injection pen (1-unit dial), Inject 16 Units under the skin daily, Disp: 5 mL, Rfl: 3  •  lisinopril (ZESTRIL) 10 mg tablet, Take 1 tablet (10 mg total) by mouth daily For blood pressure, Disp: 90 tablet, Rfl: 1  •  meclizine (ANTIVERT) 12 5 MG tablet, Take 1 tablet (12 5 mg total) by "mouth every 8 (eight) hours as needed for dizziness, Disp: 30 tablet, Rfl: 0  •  meloxicam (Mobic) 15 mg tablet, Take 1 tablet (15 mg total) by mouth daily, Disp: 30 tablet, Rfl: 1  •  metFORMIN (GLUCOPHAGE) 1000 MG tablet, TAKE 1 TABLET TWICE A DAY WITH MEALS, Disp: 180 tablet, Rfl: 3  •  naproxen (NAPROSYN) 500 mg tablet, Take 1 tablet (500 mg total) by mouth daily as needed for moderate pain (not with meloxicam), Disp: , Rfl:   •  dexamethasone (DECADRON) 1 mg tablet, Take 1 tab at 11pm and get cortisol levels checked between 7:00 a m  and 9:00 a m  on the next morning , Disp: 1 tablet, Rfl: 0  •  glucose blood (Contour Next Test) test strip, Use 1 each 2 (two) times a day Use as instructed, Disp: 60 each, Rfl: 6  •  meloxicam (MOBIC) 15 mg tablet, Take 1 tablet (15 mg total) by mouth daily as needed NOT with naproxen, Disp: , Rfl:   •  Microlet Lancets MISC, Test blood glucose twice daily  , Disp: 100 each, Rfl: 2    Review of Systems   HENT: Negative  Eyes: Negative  Respiratory: Negative  Cardiovascular: Negative  Gastrointestinal: Negative  Endocrine: Negative  Musculoskeletal: Negative  Skin: Negative  Allergic/Immunologic: Negative  Neurological: Negative  Hematological: Negative  Psychiatric/Behavioral: Negative  Physical Exam:  Body mass index is 42 88 kg/m²  Pulse 94   Temp 98 °F (36 7 °C) (Tympanic)   Ht 5' 8\" (1 727 m)   Wt 128 kg (282 lb)   SpO2 97%   BMI 42 88 kg/m²    Vitals:    06/27/23 1350   Weight: 128 kg (282 lb)        Physical Exam  Constitutional:       General: She is not in acute distress  Appearance: She is well-developed  She is not ill-appearing  HENT:      Head: Normocephalic and atraumatic  Nose: Nose normal       Mouth/Throat:      Pharynx: Oropharynx is clear  Eyes:      Extraocular Movements: Extraocular movements intact  Conjunctiva/sclera: Conjunctivae normal    Neck:      Thyroid: No thyromegaly     Cardiovascular:    " Rate and Rhythm: Normal rate  Pulmonary:      Effort: Pulmonary effort is normal    Musculoskeletal:         General: No deformity  Cervical back: Normal range of motion  Skin:     Capillary Refill: Capillary refill takes less than 2 seconds  Coloration: Skin is not pale  Findings: No rash  Neurological:      Mental Status: She is alert and oriented to person, place, and time  Psychiatric:         Behavior: Behavior normal          Labs:   Lab Results   Component Value Date    HGBA1C 7 5 (H) 06/03/2023       Lab Results   Component Value Date    RYZ5JYFKWXMB 2 453 06/03/2023       Lab Results   Component Value Date    CREATININE 0 86 12/08/2022    CREATININE 0 61 10/26/2022    CREATININE 0 69 05/22/2021    BUN 13 12/08/2022    K 4 1 12/08/2022     12/08/2022    CO2 20 (L) 12/08/2022     eGFR   Date Value Ref Range Status   12/08/2022 80 ml/min/1 73sq m Final       Lab Results   Component Value Date    ALT 29 12/08/2022    AST 11 12/08/2022    ALKPHOS 81 12/08/2022       Lab Results   Component Value Date    CHOLESTEROL 118 10/26/2022    CHOLESTEROL 113 05/22/2021    CHOLESTEROL 109 08/15/2020     Lab Results   Component Value Date    HDL 42 (L) 10/26/2022    HDL 36 (L) 05/22/2021    HDL 38 (L) 08/15/2020     Lab Results   Component Value Date    TRIG 96 10/26/2022    TRIG 130 05/22/2021    TRIG 114 08/15/2020     Lab Results   Component Value Date    [place] [address] 10/26/2022         Impression:  1  Type 2 diabetes mellitus with hyperglycemia, without long-term current use of insulin ([facility])    2  Elevated TSH    3  Mixed hyperlipidemia    4  Class 3 severe obesity due to excess calories with serious comorbidity and body mass index (BMI) of 40 0 to 44 9 in adult [facility])         Plan:    Diagnoses and all orders for this visit:    Type 2 diabetes mellitus with hyperglycemia, without long-term current use of insulin ([facility] [address] )  She is uncontrolled with A1c 7 5% however this is improved from 9 4%  TIR was 41% on last 2 weeks AGP  Today we discussed options and I reiterated advised to diet and lifestyle modifications  We will increase Trulicity and continue current dose of Toujeo 16 units nightly and metformin 1000 mg p o  twice daily  Follow-up in 6 months     -     dulaglutide (Trulicity) 4 5 RT/9 1BF injection; Inject 0 5 mL (4 5 mg total) under the skin every 7 days  -     Hemoglobin A1C; Future  -     Albumin / creatinine urine ratio; Future    Elevated TSH  Repeat labs were unremarkable  She does not seem to have a thyroid issue  Mixed hyperlipidemia  Continue Lipitor 10 mg nightly  Class 3 severe obesity due to excess calories with serious comorbidity and body mass index (BMI) of 40 0 to 44 9 in [facility])  Patient has embarked on diet and lifestyle modification and enrolled in various programs  She hopes to see benefits over the next few months  Increasing dose of Trulicity should help  Advised to continue with diet and lifestyle interventions  Follow-up in 6 months with goal weight of 250 lbs  I have spent 32 minutes with patient today in which greater than 50% of this time was spent in counseling/coordination of care  Discussed with the patient and all questioned fully answered  She will call me if any problems arise  Educated/ Counseled patient on diagnostic test results, prognosis, risk vs benefit of treatment options, importance of treatment compliance, healthy life and lifestyle choices        [address]

## 2023-06-28 ENCOUNTER — APPOINTMENT (OUTPATIENT)
Dept: PHYSICAL THERAPY | Facility: CLINIC | Age: 48
End: 2023-06-28
Payer: COMMERCIAL

## 2023-06-30 DIAGNOSIS — E78.5 HYPERLIPIDEMIA, UNSPECIFIED HYPERLIPIDEMIA TYPE: ICD-10-CM

## 2023-06-30 DIAGNOSIS — E11.65 TYPE 2 DIABETES MELLITUS WITH HYPERGLYCEMIA, WITHOUT LONG-TERM CURRENT USE OF INSULIN (HCC): ICD-10-CM

## 2023-06-30 DIAGNOSIS — I10 MILD ESSENTIAL HYPERTENSION: ICD-10-CM

## 2023-06-30 RX ORDER — ATORVASTATIN CALCIUM 10 MG/1
TABLET, FILM COATED ORAL
Qty: 90 TABLET | Refills: 3 | Status: SHIPPED | OUTPATIENT
Start: 2023-06-30

## 2023-06-30 RX ORDER — LISINOPRIL 10 MG/1
TABLET ORAL
Qty: 90 TABLET | Refills: 3 | Status: SHIPPED | OUTPATIENT
Start: 2023-06-30

## 2023-07-05 ENCOUNTER — OFFICE VISIT (OUTPATIENT)
Dept: PHYSICAL THERAPY | Facility: CLINIC | Age: 48
End: 2023-07-05
Payer: COMMERCIAL

## 2023-07-05 DIAGNOSIS — M17.12 PRIMARY OSTEOARTHRITIS OF LEFT KNEE: ICD-10-CM

## 2023-07-05 DIAGNOSIS — M25.562 ACUTE PAIN OF LEFT KNEE: Primary | ICD-10-CM

## 2023-07-05 PROCEDURE — 97140 MANUAL THERAPY 1/> REGIONS: CPT

## 2023-07-05 PROCEDURE — 97110 THERAPEUTIC EXERCISES: CPT

## 2023-07-05 NOTE — PROGRESS NOTES
Daily Note     Today's date: 2023  Patient name: Lieutenant Velasquez  : 1975  MRN: [id number]  Referring provider: Mildred Valencia  Dx:   Encounter Diagnosis     ICD-10-CM    1. Acute pain of left knee  M25.562       2. Primary osteoarthritis of left knee  M17.12                      Subjective: Pt reports her left knee continues to improve since     Objective: See treatment diary below    Assessment: Pt demonstrated poor STS tolerance secondary to pain, good eccentric control with 6" step downs. Plan: Cont. POC. Precautions:  PMHx: DM II, obesity, R knee scope, HTN, migraines  Dx: L PF OA    Manuals 7/5     5/10 5/24 6/7 6/14 6/21   Gr IV medial, inferior patellar mobs      1x100 ea    1x100 ea   L ITB foam roller 8 min     6 min 8 min 8 min 8 min 7 min   L knee flexion PROM             Laser L PF joint                          Neuro Re-Ed                                                                                                        Ther Ex             slides             Supine HA stretch 20"x3     15"x3 15"x3 15"x3 15"x3 20"x3   Supine L glute stretch 20"x3     15"x3 15"x3 15"x3 15"x3 20"x3   Prone L quad stretch 20"x3     15"x3 15"x3 15"x3 15"x3 20"x3   Standing GA stretch             SLR 3x15     3x15 3x15 3x18 3x10 3x15   SL hip ABD 3x15     3x15 3x15 3x18 2x15 3x15   clamshells G/  3x15     R/  3x15 G/ 3x15 G/  3x15 G/  3x10 G/  3x15                             Ther Activity             STS 1x10     2x10 2x10 3x10 held 1x10  (3x10 nv)   Step-ups 6"/ 1x10         (nv)  6"/  1x10   Step-downs 6"/ 1x10         (nv)  6"/  1x10                Gait Training                                       Modalities

## 2023-07-12 ENCOUNTER — OFFICE VISIT (OUTPATIENT)
Dept: PHYSICAL THERAPY | Facility: CLINIC | Age: 48
End: 2023-07-12
Payer: COMMERCIAL

## 2023-07-12 DIAGNOSIS — E11.65 TYPE 2 DIABETES MELLITUS WITH HYPERGLYCEMIA, WITHOUT LONG-TERM CURRENT USE OF INSULIN (HCC): ICD-10-CM

## 2023-07-12 DIAGNOSIS — M17.12 PRIMARY OSTEOARTHRITIS OF LEFT KNEE: ICD-10-CM

## 2023-07-12 DIAGNOSIS — M25.562 ACUTE PAIN OF LEFT KNEE: Primary | ICD-10-CM

## 2023-07-12 PROCEDURE — 97140 MANUAL THERAPY 1/> REGIONS: CPT | Performed by: PHYSICAL THERAPIST

## 2023-07-12 PROCEDURE — 97110 THERAPEUTIC EXERCISES: CPT | Performed by: PHYSICAL THERAPIST

## 2023-07-12 NOTE — PROGRESS NOTES
Daily Note     Today's date: 2023  Patient name: Anabel Ricardo  : 1975  MRN: [id number]  Referring provider: Luberta Harada  Dx:   Encounter Diagnosis     ICD-10-CM    1. Acute pain of left knee  M25.562       2. Primary osteoarthritis of left knee  M17.12                      Subjective: Pt reports improved L knee pain, stiffness, and WB activity tolerance since starting to exercise more in general 1-2 wks ago. Objective: See treatment diary below    Assessment: Pt demonstrated improved pain with stairs and STS. Plan: Cont. POC. Precautions:  PMHx: DM II, obesity, R knee scope, HTN, migraines  Dx: L PF OA    Manuals            Gr IV medial, inferior patellar mobs             L ITB foam roller 8 min 8 min           L knee flexion PROM             Laser L PF joint                          Neuro Re-Ed                                                                                                        Ther Ex             bike  L4  5 min           Supine HA stretch 20"x3 15"x3           Supine L glute stretch 20"x3 15"x3           Prone L quad stretch 20"x3 15"x3           Standing GA stretch             SLR 3x15 1#  3x10           SL hip ABD 3x15 1#  3x10           clamshells G/  3x15 G/  3x15                                     Ther Activity             STS 1x10 2x10 3x10          Step-ups 6"/ 1x10 6"/  1x10 6"/  2x10          Step-downs 6"/ 1x10 6"/  1x10 6"/  2x10                       Gait Training                                       Modalities

## 2023-07-13 RX ORDER — INSULIN GLARGINE 300 U/ML
16 INJECTION, SOLUTION SUBCUTANEOUS DAILY
Qty: 5 ML | Refills: 0 | Status: SHIPPED | OUTPATIENT
Start: 2023-07-13 | End: 2023-09-20

## 2023-07-19 ENCOUNTER — APPOINTMENT (OUTPATIENT)
Dept: PHYSICAL THERAPY | Facility: CLINIC | Age: 48
End: 2023-07-19
Payer: COMMERCIAL

## 2023-07-20 ENCOUNTER — APPOINTMENT (OUTPATIENT)
Dept: PHYSICAL THERAPY | Facility: CLINIC | Age: 48
End: 2023-07-20
Payer: COMMERCIAL

## 2023-07-26 ENCOUNTER — OFFICE VISIT (OUTPATIENT)
Dept: PHYSICAL THERAPY | Facility: CLINIC | Age: 48
End: 2023-07-26
Payer: COMMERCIAL

## 2023-07-26 DIAGNOSIS — M17.12 PRIMARY OSTEOARTHRITIS OF LEFT KNEE: ICD-10-CM

## 2023-07-26 DIAGNOSIS — M25.562 ACUTE PAIN OF LEFT KNEE: Primary | ICD-10-CM

## 2023-07-26 PROCEDURE — 97110 THERAPEUTIC EXERCISES: CPT

## 2023-07-26 PROCEDURE — 97140 MANUAL THERAPY 1/> REGIONS: CPT

## 2023-07-26 NOTE — PROGRESS NOTES
Daily Note     Today's date: 2023  Patient name: Pancho Zurita  : 1975  MRN: [id number]  Referring provider: Jannetta Bernheim  Dx:   Encounter Diagnosis     ICD-10-CM    1. Acute pain of left knee  M25.562       2. Primary osteoarthritis of left knee  M17.12           Start Time: 1630  Stop Time: 1700  Total time in clinic (min): 30 minutes    Subjective: Patient reports, "3-4/10" pain prior to therapy. Objective: See treatment diary below    Assessment: Patient showing steady progress with therapy thus far noting less pain and improved functional ability. Continue to progress as able. Plan: Cont. POC. Precautions:  PMHx: DM II, obesity, R knee scope, HTN, migraines  Dx: L PF OA    Manuals           Gr IV medial, inferior patellar mobs             L ITB foam roller 8 min 8 min 8 min          L knee flexion PROM             Laser L PF joint                          Neuro Re-Ed                                                                                                        Ther Ex             bike  L4  5 min L4  5 min          Supine HA stretch 20"x3 15"x3 15"x3          Supine L glute stretch 20"x3 15"x3 15"x3          Prone L quad stretch 20"x3 15"x3 15"x3          Standing GA stretch             SLR 3x15 1#  3x10 1#  3x10          SL hip ABD 3x15 1#  3x10 1#  3x10          clamshells G/  3x15 G/  3x15 G/  3x15                                    Ther Activity             STS 1x10 2x10 3x10          Step-ups 6"/ 1x10 6"/  1x10 6"/  2x10          Step-downs 6"/ 1x10 6"/  1x10 6"/  2x10                       Gait Training                                       Modalities

## 2023-08-01 ENCOUNTER — OFFICE VISIT (OUTPATIENT)
Dept: FAMILY MEDICINE CLINIC | Facility: CLINIC | Age: 48
End: 2023-08-01
Payer: COMMERCIAL

## 2023-08-01 VITALS
WEIGHT: 284.4 LBS | HEART RATE: 87 BPM | DIASTOLIC BLOOD PRESSURE: 68 MMHG | RESPIRATION RATE: 20 BRPM | BODY MASS INDEX: 43.1 KG/M2 | OXYGEN SATURATION: 98 % | HEIGHT: 68 IN | TEMPERATURE: 97.6 F | SYSTOLIC BLOOD PRESSURE: 104 MMHG

## 2023-08-01 DIAGNOSIS — I10 MILD ESSENTIAL HYPERTENSION: ICD-10-CM

## 2023-08-01 DIAGNOSIS — Z23 ENCOUNTER FOR IMMUNIZATION: ICD-10-CM

## 2023-08-01 DIAGNOSIS — E11.65 TYPE 2 DIABETES MELLITUS WITH HYPERGLYCEMIA, WITHOUT LONG-TERM CURRENT USE OF INSULIN (HCC): ICD-10-CM

## 2023-08-01 DIAGNOSIS — Z00.00 ANNUAL PHYSICAL EXAM: Primary | ICD-10-CM

## 2023-08-01 DIAGNOSIS — E78.2 MIXED HYPERLIPIDEMIA: ICD-10-CM

## 2023-08-01 PROCEDURE — 90471 IMMUNIZATION ADMIN: CPT

## 2023-08-01 PROCEDURE — 99396 PREV VISIT EST AGE 40-64: CPT | Performed by: FAMILY MEDICINE

## 2023-08-01 PROCEDURE — 90677 PCV20 VACCINE IM: CPT

## 2023-08-01 PROCEDURE — 99214 OFFICE O/P EST MOD 30 MIN: CPT | Performed by: FAMILY MEDICINE

## 2023-08-01 NOTE — PROGRESS NOTES
Diabetic Foot Exam    Patient's shoes and socks removed. Right Foot/Ankle   Right Foot Inspection  Skin Exam: skin normal, skin intact, dry skin, callus and callus. No warmth, no erythema, no maceration, no abnormal color, no pre-ulcer and no ulcer. Toe Exam: ROM and strength within normal limits. Sensory   Monofilament testing: intact    Vascular  Capillary refills: elevated  The right DP pulse is 2+. Left Foot/Ankle  Left Foot Inspection  Skin Exam: skin normal, skin intact, dry skin and callus. No warmth, no erythema, no maceration, normal color, no pre-ulcer and no ulcer. Toe Exam: ROM and strength within normal limits. Sensory   Monofilament testing: intact    Vascular  Capillary refills: elevated  The left DP pulse is 1+.      Assign Risk Category  No deformity present  No loss of protective sensation  No weak pulses  Risk: 0

## 2023-08-01 NOTE — PROGRESS NOTES
Assessment/Plan:       Problem List Items Addressed This Visit        Endocrine    Type 2 diabetes mellitus with hyperglycemia, without long-term current use of insulin ([address])     Encouraged to schedule DM eye exam  Reviewed most recent note from endocrine. She will titrate up trulicity to 4.5 mg dose as planned. Encouraged to work on getting back to lower carb diet. She is doing well with exercise. Lab Results   Component Value Date    HGBA1C 7.5 (H) 06/03/2023            Relevant Orders    Lipid Panel with Direct LDL reflex    Comprehensive metabolic panel       Cardiovascular and Mediastinum    Mild essential hypertension     Well controlled on lisinopril 10 mg daily (for htn as well as renal protection)            Other    Hyperlipidemia     Continue lipitor- due for lipid panel- labs ordered. Reviewed diet/exercise        -------------------------------------------------------------------        Annual physical exam    -  Primary-    Declines pap  She hasn't done cologuard or scheduled GI yet- encouraged to do so. Reviewed most recent labs. prevnar 21 today      Encounter for immunization        Relevant Orders    Pneumococcal Conjugate Vaccine 20-valent (Pcv20)               Most recent labs reviewed    Subjective:     Robi Faust is a 50 y.o. female here today with chief complaint below:  Chief Complaint   Patient presents with   • Physical Exam     Annual. No new problems or concerns at this time. •      Pt will call and schedule DM eye exam. DM foot exam at end of visit. Last pap is unknown, does not have a GYN. Declines a referral to GYN. Pt has the cologuard, but has not done it yet. - CC above per clinical staff and reviewed. HPI:  Pt is here for annual physical and follow up of chronic conditions. Pap- declines  CRC screening- has cologuard at home. She was also advised previously to schedule appointment with GI.     Diet- not great, eating more carbs b/c of increased stress. Exercise- has gone a few times to the gym here at [facility] and is going to the gym at the [facility]. She is biking and using treadmill at the gym. Has tried swimming. She says she has worked on exercise ball. Wonders about getting exercise ball to sit on at work because she used it at 's (pt asked about a workplace accommodations form, but I reviewed that I would not suggest this for her as a workplace accommodation- rather would advise to work on the exercises she learned at home)    GM passed away in June as they were on their way to visit her. GM was 92. This has been difficult. 's parents also with health issues. PHQ-2/9 Depression Screening    Little interest or pleasure in doing things: 0 - not at all  Feeling down, depressed, or hopeless: 1 - several days  PHQ-2 Score: 1  PHQ-2 Interpretation: Negative depression screen         ----------------------------------------------------------    DM- a lot of stress lately. Says the last few months, her BS is running over 200. More stress eating/carbs. Her A1C had improved on last labs. Following with endocrine Dr Nick Coronado. titrating dose of trulicity - two more weeks at current dose, then increases to 4.5 mg dose. She has trouble eating salad/lettuce b/c she has trouble swallowing it. At times any food can give her trouble- gets irritation and has to cough. She hasn't been seen by GI yet- hasn't called for an appointment yet. Plans to do so. Has referral from last appointment. She has been recommended to have sleep study. She says she doesn't want to have this done at this point. She snores, no witnessed apneas. Reviewed that I recommend that she schedules this (she will think about it)    Taking meds regularly without side effects.           The following portions of the patient's history were reviewed and updated as appropriate: allergies, current medications, past family history, past medical history, past social history, past surgical history and problem list.    ROS:  Review of Systems       Objective:      /68   Pulse 87   Temp 97.6 °F (36.4 °C)   Resp 20   Ht 5' 8" (1.727 m)   Wt 129 kg (284 lb 6.4 oz)   SpO2 98%   BMI 43.24 kg/m²   BP Readings from Last 3 Encounters:   08/01/23 104/68   05/09/23 122/82   05/09/23 140/92     Wt Readings from Last 3 Encounters:   08/01/23 129 kg (284 lb 6.4 oz)   06/27/23 128 kg (282 lb)   05/09/23 129 kg (284 lb)               Physical Exam:   Physical Exam  Vitals and nursing note reviewed. Constitutional:       Appearance: Normal appearance. She is well-developed. She is not ill-appearing. HENT:      Head: Normocephalic and atraumatic. Eyes:      Conjunctiva/sclera: Conjunctivae normal.   Cardiovascular:      Rate and Rhythm: Normal rate and regular rhythm. Heart sounds: Normal heart sounds. No murmur heard. Pulmonary:      Effort: Pulmonary effort is normal. No respiratory distress. Breath sounds: Normal breath sounds. No wheezing. Abdominal:      Palpations: Abdomen is soft. Tenderness: There is no abdominal tenderness. There is no guarding or rebound. Musculoskeletal:      Cervical back: Neck supple. Right lower leg: No edema. Left lower leg: No edema. Lymphadenopathy:      Cervical: No cervical adenopathy. Skin:     General: Skin is warm and dry. Comments: hirsuitism   Neurological:      Mental Status: She is alert and oriented to person, place, and time. Psychiatric:         Mood and Affect: Mood normal.         Behavior: Behavior normal.         BMI Counseling: Body mass index is 43.24 kg/m². The BMI is above normal. Nutrition recommendations include moderation in carbohydrate intake. Rationale for BMI follow-up plan is due to patient being overweight or obese.

## 2023-08-01 NOTE — ASSESSMENT & PLAN NOTE
Encouraged to schedule DM eye exam  Reviewed most recent note from endocrine. She will titrate up trulicity to 4.5 mg dose as planned. Encouraged to work on getting back to lower carb diet. She is doing well with exercise.   Lab Results   Component Value Date    HGBA1C 7.5 (H) 06/03/2023

## 2023-09-01 DIAGNOSIS — E11.65 TYPE 2 DIABETES MELLITUS WITH HYPERGLYCEMIA, WITHOUT LONG-TERM CURRENT USE OF INSULIN (HCC): ICD-10-CM

## 2023-09-02 RX ORDER — PEN NEEDLE, DIABETIC 32GX 5/32"
NEEDLE, DISPOSABLE MISCELLANEOUS
Qty: 100 EACH | Refills: 6 | Status: SHIPPED | OUTPATIENT
Start: 2023-09-02

## 2023-09-21 ENCOUNTER — APPOINTMENT (EMERGENCY)
Dept: CT IMAGING | Facility: HOSPITAL | Age: 48
End: 2023-09-21
Payer: COMMERCIAL

## 2023-09-21 ENCOUNTER — HOSPITAL ENCOUNTER (EMERGENCY)
Facility: HOSPITAL | Age: 48
Discharge: HOME | End: 2023-09-22
Attending: EMERGENCY MEDICINE
Payer: COMMERCIAL

## 2023-09-21 VITALS
HEIGHT: 68 IN | SYSTOLIC BLOOD PRESSURE: 122 MMHG | DIASTOLIC BLOOD PRESSURE: 77 MMHG | TEMPERATURE: 97.6 F | HEART RATE: 83 BPM | RESPIRATION RATE: 18 BRPM | BODY MASS INDEX: 43.95 KG/M2 | OXYGEN SATURATION: 99 % | WEIGHT: 290 LBS

## 2023-09-21 DIAGNOSIS — R10.31 ABDOMINAL PAIN, ACUTE, RIGHT LOWER QUADRANT: ICD-10-CM

## 2023-09-21 DIAGNOSIS — K57.30 SIGMOID DIVERTICULOSIS: ICD-10-CM

## 2023-09-21 DIAGNOSIS — N83.201 RIGHT OVARIAN CYST: Primary | ICD-10-CM

## 2023-09-21 LAB
ABO GROUP BLD: NORMAL
ALBUMIN SERPL BCP-MCNC: 4.1 G/DL (ref 3.5–5)
ALP SERPL-CCNC: 60 U/L (ref 34–104)
ALT SERPL W P-5'-P-CCNC: 19 U/L (ref 7–52)
ANION GAP SERPL CALCULATED.3IONS-SCNC: 9 MMOL/L
APTT PPP: 26 SECONDS (ref 23–37)
AST SERPL W P-5'-P-CCNC: 15 U/L (ref 13–39)
BACTERIA UR QL AUTO: ABNORMAL /HPF
BASOPHILS # BLD AUTO: 0.09 THOUSANDS/ÂΜL (ref 0–0.1)
BASOPHILS NFR BLD AUTO: 1 % (ref 0–1)
BILIRUB SERPL-MCNC: 0.95 MG/DL (ref 0.2–1)
BILIRUB UR QL STRIP: NEGATIVE
BLD GP AB SCN SERPL QL: NEGATIVE
BUN SERPL-MCNC: 9 MG/DL (ref 5–25)
CALCIUM SERPL-MCNC: 8.9 MG/DL (ref 8.4–10.2)
CHLORIDE SERPL-SCNC: 102 MMOL/L (ref 96–108)
CLARITY UR: CLEAR
CO2 SERPL-SCNC: 23 MMOL/L (ref 21–32)
COLOR UR: YELLOW
CREAT SERPL-MCNC: 0.7 MG/DL (ref 0.6–1.3)
EOSINOPHIL # BLD AUTO: 0.22 THOUSAND/ÂΜL (ref 0–0.61)
EOSINOPHIL NFR BLD AUTO: 2 % (ref 0–6)
ERYTHROCYTE [DISTWIDTH] IN BLOOD BY AUTOMATED COUNT: 15.5 % (ref 11.6–15.1)
EXT PREGNANCY TEST URINE: NEGATIVE
EXT. CONTROL: NORMAL
GFR SERPL CREATININE-BSD FRML MDRD: 102 ML/MIN/1.73SQ M
GLUCOSE SERPL-MCNC: 143 MG/DL (ref 65–140)
GLUCOSE UR STRIP-MCNC: ABNORMAL MG/DL
HCT VFR BLD AUTO: 41.3 % (ref 34.8–46.1)
HGB BLD-MCNC: 13.4 G/DL (ref 11.5–15.4)
HGB UR QL STRIP.AUTO: ABNORMAL
IMM GRANULOCYTES # BLD AUTO: 0.04 THOUSAND/UL (ref 0–0.2)
IMM GRANULOCYTES NFR BLD AUTO: 0 % (ref 0–2)
INR PPP: 1.02 (ref 0.84–1.19)
KETONES UR STRIP-MCNC: ABNORMAL MG/DL
LACTATE SERPL-SCNC: 1.6 MMOL/L (ref 0.5–2)
LEUKOCYTE ESTERASE UR QL STRIP: ABNORMAL
LIPASE SERPL-CCNC: 22 U/L (ref 11–82)
LYMPHOCYTES # BLD AUTO: 5.07 THOUSANDS/ÂΜL (ref 0.6–4.47)
LYMPHOCYTES NFR BLD AUTO: 39 % (ref 14–44)
MCH RBC QN AUTO: 27.2 PG (ref 26.8–34.3)
MCHC RBC AUTO-ENTMCNC: 32.4 G/DL (ref 31.4–37.4)
MCV RBC AUTO: 84 FL (ref 82–98)
MONOCYTES # BLD AUTO: 0.74 THOUSAND/ÂΜL (ref 0.17–1.22)
MONOCYTES NFR BLD AUTO: 6 % (ref 4–12)
MUCOUS THREADS UR QL AUTO: ABNORMAL
NEUTROPHILS # BLD AUTO: 6.77 THOUSANDS/ÂΜL (ref 1.85–7.62)
NEUTS SEG NFR BLD AUTO: 52 % (ref 43–75)
NITRITE UR QL STRIP: NEGATIVE
NON-SQ EPI CELLS URNS QL MICRO: ABNORMAL /HPF
NRBC BLD AUTO-RTO: 0 /100 WBCS
PH UR STRIP.AUTO: 5 [PH]
PLATELET # BLD AUTO: 307 THOUSANDS/UL (ref 149–390)
PMV BLD AUTO: 11.7 FL (ref 8.9–12.7)
POTASSIUM SERPL-SCNC: 3.6 MMOL/L (ref 3.5–5.3)
PROT SERPL-MCNC: 7.3 G/DL (ref 6.4–8.4)
PROT UR STRIP-MCNC: NEGATIVE MG/DL
PROTHROMBIN TIME: 14 SECONDS (ref 11.6–14.5)
RBC # BLD AUTO: 4.92 MILLION/UL (ref 3.81–5.12)
RBC #/AREA URNS AUTO: ABNORMAL /HPF
RH BLD: POSITIVE
SODIUM SERPL-SCNC: 134 MMOL/L (ref 135–147)
SP GR UR STRIP.AUTO: 1.02 (ref 1–1.03)
SPECIMEN EXPIRATION DATE: NORMAL
UROBILINOGEN UR STRIP-ACNC: <2 MG/DL
WBC # BLD AUTO: 12.93 THOUSAND/UL (ref 4.31–10.16)
WBC #/AREA URNS AUTO: ABNORMAL /HPF

## 2023-09-21 PROCEDURE — 81001 URINALYSIS AUTO W/SCOPE: CPT | Performed by: PHYSICIAN ASSISTANT

## 2023-09-21 PROCEDURE — 86850 RBC ANTIBODY SCREEN: CPT | Performed by: PHYSICIAN ASSISTANT

## 2023-09-21 PROCEDURE — 83690 ASSAY OF LIPASE: CPT | Performed by: PHYSICIAN ASSISTANT

## 2023-09-21 PROCEDURE — 87086 URINE CULTURE/COLONY COUNT: CPT | Performed by: PHYSICIAN ASSISTANT

## 2023-09-21 PROCEDURE — 80053 COMPREHEN METABOLIC PANEL: CPT | Performed by: PHYSICIAN ASSISTANT

## 2023-09-21 PROCEDURE — 86901 BLOOD TYPING SEROLOGIC RH(D): CPT | Performed by: PHYSICIAN ASSISTANT

## 2023-09-21 PROCEDURE — 83605 ASSAY OF LACTIC ACID: CPT | Performed by: PHYSICIAN ASSISTANT

## 2023-09-21 PROCEDURE — 85610 PROTHROMBIN TIME: CPT | Performed by: PHYSICIAN ASSISTANT

## 2023-09-21 PROCEDURE — 96361 HYDRATE IV INFUSION ADD-ON: CPT

## 2023-09-21 PROCEDURE — 81025 URINE PREGNANCY TEST: CPT | Performed by: PHYSICIAN ASSISTANT

## 2023-09-21 PROCEDURE — 99284 EMERGENCY DEPT VISIT MOD MDM: CPT

## 2023-09-21 PROCEDURE — 85025 COMPLETE CBC W/AUTO DIFF WBC: CPT | Performed by: PHYSICIAN ASSISTANT

## 2023-09-21 PROCEDURE — 96374 THER/PROPH/DIAG INJ IV PUSH: CPT

## 2023-09-21 PROCEDURE — 36415 COLL VENOUS BLD VENIPUNCTURE: CPT | Performed by: PHYSICIAN ASSISTANT

## 2023-09-21 PROCEDURE — 74177 CT ABD & PELVIS W/CONTRAST: CPT

## 2023-09-21 PROCEDURE — G1004 CDSM NDSC: HCPCS

## 2023-09-21 PROCEDURE — 85730 THROMBOPLASTIN TIME PARTIAL: CPT | Performed by: PHYSICIAN ASSISTANT

## 2023-09-21 PROCEDURE — 99285 EMERGENCY DEPT VISIT HI MDM: CPT | Performed by: PHYSICIAN ASSISTANT

## 2023-09-21 PROCEDURE — 86900 BLOOD TYPING SEROLOGIC ABO: CPT | Performed by: PHYSICIAN ASSISTANT

## 2023-09-21 PROCEDURE — 96375 TX/PRO/DX INJ NEW DRUG ADDON: CPT

## 2023-09-21 RX ORDER — HYDROMORPHONE HCL/PF 1 MG/ML
0.5 SYRINGE (ML) INJECTION ONCE
Status: COMPLETED | OUTPATIENT
Start: 2023-09-21 | End: 2023-09-21

## 2023-09-21 RX ORDER — ONDANSETRON 2 MG/ML
4 INJECTION INTRAMUSCULAR; INTRAVENOUS ONCE
Status: COMPLETED | OUTPATIENT
Start: 2023-09-21 | End: 2023-09-21

## 2023-09-21 RX ADMIN — IOHEXOL 100 ML: 350 INJECTION, SOLUTION INTRAVENOUS at 22:39

## 2023-09-21 RX ADMIN — HYDROMORPHONE HYDROCHLORIDE 0.5 MG: 1 INJECTION, SOLUTION INTRAMUSCULAR; INTRAVENOUS; SUBCUTANEOUS at 21:35

## 2023-09-21 RX ADMIN — IOHEXOL 50 ML: 240 INJECTION, SOLUTION INTRATHECAL; INTRAVASCULAR; INTRAVENOUS; ORAL at 20:58

## 2023-09-21 RX ADMIN — ONDANSETRON 4 MG: 2 INJECTION INTRAMUSCULAR; INTRAVENOUS at 21:37

## 2023-09-21 RX ADMIN — SODIUM CHLORIDE 1000 ML: 0.9 INJECTION, SOLUTION INTRAVENOUS at 21:35

## 2023-09-21 NOTE — Clinical Note
Juju Beauchamp was seen and treated in our emergency department on 9/21/2023. Diagnosis:     Aubrie Senthil  . She may return on this date: 09/23/2023         If you have any questions or concerns, please don't hesitate to call.       Charito Escalona PA-C    ______________________________           _______________          _______________  Hospital Representative                              Date                                Time

## 2023-09-22 NOTE — ED PROVIDER NOTES
History  Chief Complaint   Patient presents with   • Abdominal Pain     C/o right lower abdominal pain that started yesterday. States pain radiates to left abdomen and lower back. Denies N/V/D. Denies fevers. [facility] Pt states blood work and urine sample was normal.     Patient is a 42-year-old female with a history of diabetes mellitus type 2, migraines, and ischemic past surgical history that presents emerged from with dull aching persistent worsening right lower quad abdominal pain with radiation to lower abdomen for 2 days. Patient denies associated symptoms. Patient was transferred to the emergency department at [facility] at this time secondary to right lower quadrant abdominal pain for appendicitis rule out by provider earlier today. Patient denies any medications. Patient has questionable dietary item intake. Patient denies food allergy. Patient has new medications. Patient denies recent antibiotic use. Patient has Pottle factors with provocative factors of pressure to right side of abdomen. Patient denies noneffective treatment. Patient denies fevers, chills, nausea, vomiting, diarrhea, constipation and urinary symptoms. Patient denies recent fall and recent trauma. Patient denies sick contacts recent travel. Patient denies chest pain and shortness of breath. History provided by:  Patient   used: No    Abdominal Pain  Associated symptoms: no chest pain, no chills, no constipation, no cough, no diarrhea, no dysuria, no fever, no nausea, no shortness of breath, no sore throat and no vomiting        Prior to Admission Medications   Prescriptions Last Dose Informant Patient Reported? Taking?    BD Pen Needle Sindhu 2nd Gen 32G X 4 MM MISC   No No   Sig: USE ONE PEN NEEDLE DAILY   Continuous Blood Gluc  (FreeStyle Shelly 2 [place]) BLANCHE   No No   Sig: Use 4 times a day   Continuous Blood Gluc Sensor (FreeStyle Shelly 2 Sensor) MISC   No No Sig: Change every 14 days   Toujeo SoloStar 300 units/mL CONCENTRATED U-300 injection pen (1-unit dial)   No No   Sig: INJECT 16 UNITS UNDER THE SKIN DAILY   atorvastatin (LIPITOR) 10 mg tablet   No No   Sig: TAKE 1 TABLET DAILY   dulaglutide (Trulicity) 4.5 WW/0.9AU injection   No No   Sig: Inject 0.5 mL (4.5 mg total) under the skin every 7 days   Patient taking differently: Inject 4.5 mg under the skin every 7 days Currently finishing 3mg pen, will then start 4.5mg   lisinopril (ZESTRIL) 10 mg tablet   No No   Sig: TAKE 1 TABLET DAILY FOR BLOOD PRESSURE   meloxicam (MOBIC) 15 mg tablet  Self No No   Sig: Take 1 tablet (15 mg total) by mouth daily as needed NOT with naproxen   metFORMIN (GLUCOPHAGE) 1000 MG tablet   No No   Sig: TAKE 1 TABLET TWICE A DAY WITH MEALS   naproxen (NAPROSYN) 500 mg tablet  Self No No   Sig: Take 1 tablet (500 mg total) by mouth daily as needed for moderate pain (not with meloxicam)      Facility-Administered Medications: None       Past Medical History:   Diagnosis Date   • Arthritis    • Benign essential hypertension     Resolved 2016    • Diabetes mellitus ([address])    • Diabetes mellitus type 2, controlled (HCC)    • Dyspnea and respiratory abnormality     Resolved 2015    • Headache(784.0)    • Migraine    • Varicella    • Vertigo        Past Surgical History:   Procedure Laterality Date   • HAND SURGERY  2014   • KNEE ARTHROPLASTY Right    • WISDOM TOOTH EXTRACTION         Family History   Problem Relation Age of Onset   • Diabetes unspecified Mother    • Diabetes Mother    • Lupus Mother    • Diabetes type II Mother    • Cancer Mother    • Heart murmur Father    • Other Father         Right leg amputation    • Diabetes type II Father    • Diabetes unspecified Sister    • Diabetes Sister    • Cancer Maternal Grandmother         Skin, also maybe breast.   age 80s.    • No Known Problems Maternal Grandfather         mild dementia, 80s   • Breast cancer Paternal Grandmother         47-56s,  age 80   • Diabetes unspecified Paternal Grandfather    • Arrhythmia Paternal Grandfather    • Heart attack Paternal Grandfather    • Heart disease Paternal Grandfather          age 79 after pacemaker placement      I have reviewed and agree with the history as documented. E-Cigarette/Vaping   • E-Cigarette Use Never User      E-Cigarette/Vaping Substances   • Nicotine No    • THC No    • CBD No    • Flavoring No      Social History     Tobacco Use   • Smoking status: Never   • Smokeless tobacco: Never   Vaping Use   • Vaping Use: Never used   Substance Use Topics   • Alcohol use: Yes     Comment: Once in a blue moon   • Drug use: No       Review of Systems   Constitutional: Negative for activity change, appetite change, chills and fever. HENT: Negative for congestion, postnasal drip, rhinorrhea, sinus pressure, sinus pain, sore throat and tinnitus. Eyes: Negative for photophobia and visual disturbance. Respiratory: Negative for cough, chest tightness and shortness of breath. Cardiovascular: Negative for chest pain and palpitations. Gastrointestinal: Positive for abdominal pain. Negative for constipation, diarrhea, nausea and vomiting. Genitourinary: Negative for difficulty urinating, dysuria, flank pain, frequency and urgency. Musculoskeletal: Negative for back pain, gait problem, neck pain and neck stiffness. Skin: Negative for pallor and rash. Allergic/Immunologic: Negative for environmental allergies and food allergies. Neurological: Negative for dizziness, weakness, numbness and headaches. Psychiatric/Behavioral: Negative for confusion. All other systems reviewed and are negative. Physical Exam  Physical Exam  Vitals and nursing note reviewed. Constitutional:       General: She is awake. Appearance: Normal appearance. She is well-developed and normal weight. She is not ill-appearing, toxic-appearing or diaphoretic.       Comments: /85 (BP Location: Right arm)   Pulse 89   Temp 97.6 °F (36.4 °C) (Oral)   Resp 18   LMP 09/07/2023 (Approximate)   SpO2 98%      HENT:      Head: Normocephalic and atraumatic. Jaw: There is normal jaw occlusion. Right Ear: Hearing, tympanic membrane and external ear normal. No decreased hearing noted. No drainage, swelling or tenderness. No mastoid tenderness. Left Ear: Hearing, tympanic membrane and external ear normal. No decreased hearing noted. No drainage, swelling or tenderness. No mastoid tenderness. Nose: Nose normal.      Mouth/Throat:      Lips: Pink. Mouth: Mucous membranes are moist.      Pharynx: Oropharynx is clear. Uvula midline. Eyes:      General: Lids are normal. Vision grossly intact. Gaze aligned appropriately. Right eye: No discharge. Left eye: No discharge. Extraocular Movements: Extraocular movements intact. Conjunctiva/sclera: Conjunctivae normal.      Pupils: Pupils are equal, round, and reactive to light. Neck:      Vascular: No JVD. Trachea: Trachea and phonation normal. No tracheal tenderness or tracheal deviation. Cardiovascular:      Rate and Rhythm: Normal rate and regular rhythm. Pulses: Normal pulses. Radial pulses are 2+ on the right side and 2+ on the left side. Posterior tibial pulses are 2+ on the right side and 2+ on the left side. Heart sounds: Normal heart sounds. Pulmonary:      Effort: Pulmonary effort is normal.      Breath sounds: Normal breath sounds and air entry. No stridor. No decreased breath sounds, wheezing, rhonchi or rales. Chest:      Chest wall: No tenderness. Abdominal:      General: Abdomen is flat. Bowel sounds are normal. There is no distension. Palpations: Abdomen is soft. Abdomen is not rigid. Tenderness: There is abdominal tenderness in the right upper quadrant and right lower quadrant.  There is no right CVA tenderness, left CVA tenderness, guarding or rebound. Musculoskeletal:         General: Normal range of motion. Cervical back: Full passive range of motion without pain, normal range of motion and neck supple. No rigidity. No spinous process tenderness or muscular tenderness. Normal range of motion. Feet:      Right foot:      Toenail Condition: Right toenails are normal.      Left foot:      Toenail Condition: Left toenails are normal.   Lymphadenopathy:      Head:      Right side of head: No submental, submandibular, tonsillar, preauricular, posterior auricular or occipital adenopathy. Left side of head: No submental, submandibular, tonsillar, preauricular, posterior auricular or occipital adenopathy. Cervical: No cervical adenopathy. Right cervical: No superficial, deep or posterior cervical adenopathy. Left cervical: No superficial, deep or posterior cervical adenopathy. Skin:     General: Skin is warm. Capillary Refill: Capillary refill takes less than 2 seconds. Findings: No rash. Neurological:      General: No focal deficit present. Mental Status: She is alert and oriented to person, place, and time. Mental status is at baseline. GCS: GCS eye subscore is 4. GCS verbal subscore is 5. GCS motor subscore is 6. Sensory: No sensory deficit. Psychiatric:         Attention and Perception: Attention normal.         Mood and Affect: Mood normal.         Speech: Speech normal.         Behavior: Behavior normal. Behavior is cooperative. Thought Content:  Thought content normal.         Judgment: Judgment normal.         Vital Signs  ED Triage Vitals [09/21/23 1946]   Temperature Pulse Respirations Blood Pressure SpO2   97.6 °F (36.4 °C) 89 18 136/85 98 %      Temp Source Heart Rate Source Patient Position - Orthostatic VS BP Location FiO2 (%)   Oral Monitor Sitting Right arm --      Pain Score       8           Vitals:    09/21/23 1946 09/21/23 2147   BP: 136/85 122/77   Pulse: 89 83 Patient Position - Orthostatic VS: Sitting Sitting         Visual Acuity      ED Medications  Medications   sodium chloride 0.9 % bolus 1,000 mL (0 mL Intravenous Stopped 9/21/23 2235)   ondansetron (ZOFRAN) injection 4 mg (4 mg Intravenous Given 9/21/23 2137)   HYDROmorphone (DILAUDID) injection 0.5 mg (0.5 mg Intravenous Given 9/21/23 2135)   iohexol (OMNIPAQUE) 240 MG/ML solution 50 mL (50 mL Oral Given 9/21/23 2058)   iohexol (OMNIPAQUE) 350 MG/ML injection (SINGLE-DOSE) 100 mL (100 mL Intravenous Given 9/21/23 2239)       Diagnostic Studies  Results Reviewed     Procedure Component Value Units Date/Time    Urine culture [[id number]] Collected: 09/21/23 2141    Lab Status: In process Specimen: Urine, Clean Catch Updated: 09/22/23 0040    Lactic acid, plasma (w/reflex if result > 2.0) [[id number]]  (Normal) Collected: 09/21/23 2135    Lab Status: Final result Specimen: Blood from Arm, Right Updated: 09/21/23 2226     LACTIC ACID 1.6 mmol/L     Narrative:      Result may be elevated if tourniquet was used during collection.     Comprehensive metabolic panel [[id number]]  (Abnormal) Collected: 09/21/23 2135    Lab Status: Final result Specimen: Blood from Arm, Right Updated: 09/21/23 2226     Sodium 134 mmol/L      Potassium 3.6 mmol/L      Chloride 102 mmol/L      CO2 23 mmol/L      ANION GAP 9 mmol/L      BUN 9 mg/dL      Creatinine 0.70 mg/dL      Glucose 143 mg/dL      Calcium 8.9 mg/dL      AST 15 U/L      ALT 19 U/L      Alkaline Phosphatase 60 U/L      Total Protein 7.3 g/dL      Albumin 4.1 g/dL      Total Bilirubin 0.95 mg/dL      eGFR 102 ml/min/1.73sq m     Narrative:      [place] guidelines for Chronic Kidney Disease (CKD):   •  Stage 1 with normal or high GFR (GFR > 90 mL/min/1.73 square meters)  •  Stage 2 Mild CKD (GFR = 60-89 mL/min/1.73 square meters)  •  Stage 3A Moderate CKD (GFR = 45-59 mL/min/1.73 square meters)  •  Stage 3B Moderate CKD (GFR = 30-44 mL/min/1.73 square meters)  •  Stage 4 Severe CKD (GFR = 15-29 mL/min/1.73 square meters)  •  Stage 5 End Stage CKD (GFR <15 mL/min/1.73 square meters)  Note: GFR calculation is accurate only with a steady state creatinine    Lipase [[id number]]  (Normal) Collected: 09/21/23 2135    Lab Status: Final result Specimen: Blood from Arm, Right Updated: 09/21/23 2226     Lipase 22 u/L     Protime-INR [[id number]]  (Normal) Collected: 09/21/23 2135    Lab Status: Final result Specimen: Blood from Arm, Right Updated: 09/21/23 2218     Protime 14.0 seconds      INR 1.02    APTT [[id number]]  (Normal) Collected: 09/21/23 2135    Lab Status: Final result Specimen: Blood from Arm, Right Updated: 09/21/23 2218     PTT 26 seconds     Urine Microscopic [[id number]]  (Abnormal) Collected: 09/21/23 2141    Lab Status: Final result Specimen: Urine, Clean Catch Updated: 09/21/23 2205     RBC, UA 2-4 /hpf      WBC, UA 2-4 /hpf      Epithelial Cells Occasional /hpf      Bacteria, UA None Seen /hpf      MUCUS THREADS Occasional    UA w Reflex to Microscopic w Reflex to Culture [[id number]]  (Abnormal) Collected: 09/21/23 2141    Lab Status: Final result Specimen: Urine, Clean Catch Updated: 09/21/23 2204     Color, UA Yellow     Clarity, UA Clear     Specific Gravity, UA 1.023     pH, UA 5.0     Leukocytes, UA Small     Nitrite, UA Negative     Protein, UA Negative mg/dl      Glucose, UA Trace mg/dl      Ketones, UA 10 (1+) mg/dl      Urobilinogen, UA <2.0 mg/dl      Bilirubin, UA Negative     Occult Blood, UA Small    CBC and differential [[id number]]  (Abnormal) Collected: 09/21/23 2135    Lab Status: Final result Specimen: Blood from Arm, Right Updated: 09/21/23 2156     WBC 12.93 Thousand/uL      RBC 4.92 Million/uL      Hemoglobin 13.4 g/dL      Hematocrit 41.3 %      MCV 84 fL      MCH 27.2 pg      MCHC 32.4 g/dL      RDW 15.5 %      MPV 11.7 fL      Platelets 416 Thousands/uL      nRBC 0 /100 WBCs      Neutrophils Relative 52 %      Immat GRANS % 0 % Lymphocytes Relative 39 %      Monocytes Relative 6 %      Eosinophils Relative 2 %      Basophils Relative 1 %      Neutrophils Absolute 6.77 Thousands/µL      Immature Grans Absolute 0.04 Thousand/uL      Lymphocytes Absolute 5.07 Thousands/µL      Monocytes Absolute 0.74 Thousand/µL      Eosinophils Absolute 0.22 Thousand/µL      Basophils Absolute 0.09 Thousands/µL     POCT pregnancy, urine [[id number]]  (Normal) Resulted: 09/21/23 2140    Lab Status: Final result Updated: 09/21/23 2141     EXT Preg Test, Ur Negative     Control Valid                 CT abdomen pelvis with contrast   ED Interpretation by Herrera Nicholson PA-C (09/22 0007)   Hanane Armstrong MD  [phone] 9/21/2023     Narrative & Impression  CT ABDOMEN AND PELVIS WITH IV CONTRAST     INDICATION:   RLQ abdominal pain (Age >= 14y)  right lower quadrant adominal pain.     COMPARISON:  None.     TECHNIQUE:  CT examination of the abdomen and pelvis was performed. Multiplanar 2D reformatted images were created from the source data.     This examination, like all CT scans performed in the [facility], was performed utilizing techniques to minimize radiation dose exposure, including the use of iterative reconstruction and automated exposure control. Radiation dose length   product (DLP) for this visit:  2542 mGy-cm     IV Contrast:  100 mL of iohexol (OMNIPAQUE)  Enteric Contrast:  Enteric contrast was not administered.     FINDINGS:     ABDOMEN     LOWER CHEST:  No clinically significant abnormality identified in the visualized lower chest.     LIVER/BILIARY TREE:  Unremarkable.     GALLBLADDER:  No calcified gallstones. No pericholecystic infl   ammatory change.     SPLEEN:  Unremarkable.     PANCREAS:  Unremarkable.     ADRENAL GLANDS:  Unremarkable.     KIDNEYS/URETERS: Kidneys are normal in size and position. Too small to characterize hypodensity in the posterior midpole of the left kidney.  No suspicious solid renal mass, nephrolithiasis, hydronephrosis, or perinephric fluid collections bilaterally.     STOMACH AND BOWEL: Stomach is moderately distended with air and oral contrast. No intraluminal mass or irregular wall thickening. Administered oral contrast has reached proximal ileal loops in the pelvis. No evidence for bowel obstruction or   inflammation. Terminal ileum and appendix are unremarkable. Mild descending and sigmoid diverticulosis without evidence for acute diverticulitis.     APPENDIX:  No findings to suggest appendicitis.     ABDOMINOPELVIC CAVITY:  No ascites. No pneumoperitoneum. No lymphadenopathy.     VESSELS: Mild scattered calcific atherosclerosis of the lower abdominal aorta and right common iliac a   rtery. Aorta and IVC appear normal in course and caliber. Portal and splenic veins are grossly patent and unremarkable     PELVIS     REPRODUCTIVE ORGANS: Uterus and left axilla appear grossly unremarkable for patient's age. 2.1 cm hypodense focus in the right adnexa could represent dominant follicle or or small ovarian cyst.     URINARY BLADDER: Mildly distended and grossly unremarkable.     ABDOMINAL WALL/INGUINAL REGIONS:  Unremarkable.     OSSEOUS STRUCTURES:  No acute fracture or destructive osseous lesion. Mild multilevel degenerative changes of the thoracolumbar spine     IMPRESSION:     2.1 cm hypodense focus in the right adnexa could represent dominant follicle or or small ovarian cyst. Mild descending and sigmoid diverticulosis without evidence for diverticulitis. No evidence for bowel obstruction, inflammation, appendicitis,   obstructive uropathy, free air, or free fluid.           Workstation performed: [id number]        Final Result by Bree Mcmahon MD (09/21 1195)      2.1 cm hypodense focus in the right adnexa could represent dominant follicle or or small ovarian cyst. Mild descending and sigmoid diverticulosis without evidence for diverticulitis.   No evidence for bowel obstruction, inflammation, appendicitis,    obstructive uropathy, free air, or free fluid. Workstation performed: [id number]                    Procedures  Procedures         ED Course                               SBIRT 22yo+    Flowsheet Row Most Recent Value   Initial Alcohol Screen: US AUDIT-C     1. How often do you have a drink containing alcohol? 0 Filed at: 09/21/2023 2144   2. How many drinks containing alcohol do you have on a typical day you are drinking? 0 Filed at: 09/21/2023 2144   3a. Male UNDER 65: How often do you have five or more drinks on one occasion? 0 Filed at: 09/21/2023 2144   3b. FEMALE Any Age, or MALE 65+: How often do you have 4 or more drinks on one occassion? 0 Filed at: 09/21/2023 2144   Audit-C Score 0 Filed at: 09/21/2023 2144   CELIA: How many times in the past year have you. .. Used an illegal drug or used a prescription medication for non-medical reasons? Never Filed at: 09/21/2023 2144                    Medical Decision Making  Patient is a 55-year-old female with a history of diabetes mellitus type 2, migraines, and ischemic past surgical history that presents emerged from with dull aching persistent worsening right lower quad abdominal pain with radiation to lower abdomen for 2 days.     Hemodynamically stable and afebrile  CT abdomen pelvis with contrast-impression-"2.1 cm hypodense focus in the right adnexa could represent dominant follicle or or small ovarian cyst. Mild descending and sigmoid diverticulosis without evidence for diverticulitis.  No evidence for bowel obstruction, inflammation, appendicitis, obstructive uropathy, free air, or free fluid."  Mild leukocytosis 12.93, likely reactive, no banding, no lactic acidosis  Unremarkable urinalysis; urine culture in process; patient denies dysuria; negative urine pregnancy  Multimodal pain regimen delivered in ED with patient verbalized decrease in right lower quadrant abdominal pain symptoms status post medication delivery  Follow-up with OB/GYN as needed for right ovarian cyst  Follow-up with PCP and emergent department should symptoms persist or exacerbate sign also counseled patient on consuming increased amounts of plant-based fiber approximately 25 to 35 g daily for diagnosis of diverticulosis  Follow-up with emergency department should symptoms persist or exacerbate  Patient demonstrates verbal understanding of all clinical laboratory and imaging findings, discharge instructions follow-up and verbalized agreement with patient current treatment plan. Amount and/or Complexity of Data Reviewed  Labs: ordered. Decision-making details documented in ED Course. Radiology: ordered. Decision-making details documented in ED Course. Risk  Prescription drug management. Disposition  Final diagnoses:   Right ovarian cyst - 2.1 cm   Abdominal pain, acute, right lower quadrant   Sigmoid diverticulosis     Time reflects when diagnosis was documented in both MDM as applicable and the Disposition within this note     Time User Action Codes Description Comment    9/22/2023 12:20 AM Marisela Christos Add [F65.793] Right ovarian cyst     9/22/2023 12:20 AM Marisela Christos Modify [N83.201] Right ovarian cyst 2.1 cm    9/22/2023 12:20 AM Marisela Christos Add [R10.31] Abdominal pain, acute, right lower quadrant     9/22/2023 12:22 AM Marisela Christos Add [K57.30] Sigmoid diverticulosis       ED Disposition     ED Disposition   Discharge    Condition   Stable    Date/Time   Fri Sep 22, 2023 12:22 AM    Comment   Jonathon Zamarripa discharge to home/self care. Follow-up Information     Follow up With Specialties Details Why Contact Info Additional Information    Bridger Orellana MD Family Medicine   [address] [place] [address].   [address] [place] [address] Emergency Department Emergency Medicine   [address]  [address] [facility] Emergency Department, [place], [place], Critical access hospital Obstetrics and Gynecology  Right ovarian cyst 2.1 cm [address] [place] [phone]  [facility], [address] [place] [address],  [address]          Discharge Medication List as of 9/22/2023 12:24 AM      CONTINUE these medications which have NOT CHANGED    Details   atorvastatin (LIPITOR) 10 mg tablet TAKE 1 TABLET DAILY, Normal      BD Pen Needle Sindhu 2nd Gen 32G X 4 MM MISC USE ONE PEN NEEDLE DAILY, Normal      Continuous Blood Gluc  (FreeStyle Shelly 2 [place]) BLANCHE Use 4 times a day, Normal      Continuous Blood Gluc Sensor (FreeStyle Shelly 2 Sensor) MISC Change every 14 days, Normal      dulaglutide (Trulicity) 4.5 NN/8.9BS injection Inject 0.5 mL (4.5 mg total) under the skin every 7 days, Starting Tue 6/27/2023, Normal      lisinopril (ZESTRIL) 10 mg tablet TAKE 1 TABLET DAILY FOR BLOOD PRESSURE, Normal      meloxicam (MOBIC) 15 mg tablet Take 1 tablet (15 mg total) by mouth daily as needed NOT with naproxen, Starting Tue 10/25/2022, No Print      metFORMIN (GLUCOPHAGE) 1000 MG tablet TAKE 1 TABLET TWICE A DAY WITH MEALS, Normal      naproxen (NAPROSYN) 500 mg tablet Take 1 tablet (500 mg total) by mouth daily as needed for moderate pain (not with meloxicam), Starting Tue 10/25/2022, No Print      Toujeo SoloStar 300 units/mL CONCENTRATED U-300 injection pen (1-unit dial) INJECT 16 UNITS UNDER THE SKIN DAILY, Starting Wed 9/20/2023, Normal             No discharge procedures on file.     PDMP Review       Value Time User    PDMP Reviewed  Yes 9/22/2023 12:22 AM Melda Sever, PA-C          ED Provider  Electronically Signed by           Melda Sever, PA-C  09/22/23 4958

## 2023-09-22 NOTE — DISCHARGE INSTRUCTIONS
Yaa Acevedo MD  [phone] 9/21/2023      Narrative & Impression  CT ABDOMEN AND PELVIS WITH IV CONTRAST     INDICATION:   RLQ abdominal pain (Age >= 14y)  right lower quadrant adominal pain. COMPARISON:  None. TECHNIQUE:  CT examination of the abdomen and pelvis was performed. Multiplanar 2D reformatted images were created from the source data. This examination, like all CT scans performed in the [facility], was performed utilizing techniques to minimize radiation dose exposure, including the use of iterative reconstruction and automated exposure control. Radiation dose length product (DLP) for this visit:  7541 mGy-cm     IV Contrast:  100 mL of iohexol (OMNIPAQUE)  Enteric Contrast:  Enteric contrast was not administered. FINDINGS:     ABDOMEN     LOWER CHEST:  No clinically significant abnormality identified in the visualized lower chest.     LIVER/BILIARY TREE:  Unremarkable. GALLBLADDER:  No calcified gallstones. No pericholecystic inflammatory change. SPLEEN:  Unremarkable. PANCREAS:  Unremarkable. ADRENAL GLANDS:  Unremarkable. KIDNEYS/URETERS: Kidneys are normal in size and position. Too small to characterize hypodensity in the posterior midpole of the left kidney. No suspicious solid renal mass, nephrolithiasis, hydronephrosis, or perinephric fluid collections bilaterally. STOMACH AND BOWEL: Stomach is moderately distended with air and oral contrast. No intraluminal mass or irregular wall thickening. Administered oral contrast has reached proximal ileal loops in the pelvis. No evidence for bowel obstruction or inflammation. Terminal ileum and appendix are unremarkable. Mild descending and sigmoid diverticulosis without evidence for acute diverticulitis. APPENDIX:  No findings to suggest appendicitis. ABDOMINOPELVIC CAVITY:  No ascites. No pneumoperitoneum. No lymphadenopathy.      VESSELS: Mild scattered calcific atherosclerosis of the lower abdominal aorta and right common iliac artery. Aorta and IVC appear normal in course and caliber. Portal and splenic veins are grossly patent and unremarkable     PELVIS     REPRODUCTIVE ORGANS: Uterus and left axilla appear grossly unremarkable for patient's age. 2.1 cm hypodense focus in the right adnexa could represent dominant follicle or or small ovarian cyst.     URINARY BLADDER: Mildly distended and grossly unremarkable. ABDOMINAL WALL/INGUINAL REGIONS:  Unremarkable. OSSEOUS STRUCTURES:  No acute fracture or destructive osseous lesion. Mild multilevel degenerative changes of the thoracolumbar spine     IMPRESSION:     2.1 cm hypodense focus in the right adnexa could represent dominant follicle or or small ovarian cyst. Mild descending and sigmoid diverticulosis without evidence for diverticulitis. No evidence for bowel obstruction, inflammation, appendicitis, obstructive uropathy, free air, or free fluid.      Workstation performed: [id number]

## 2023-09-23 LAB — BACTERIA UR CULT: NORMAL

## 2023-10-04 DIAGNOSIS — E11.65 TYPE 2 DIABETES MELLITUS WITH HYPERGLYCEMIA, WITHOUT LONG-TERM CURRENT USE OF INSULIN (HCC): ICD-10-CM

## 2023-10-16 ENCOUNTER — VBI (OUTPATIENT)
Dept: ADMINISTRATIVE | Facility: OTHER | Age: 48
End: 2023-10-16

## 2023-11-01 DIAGNOSIS — E11.65 TYPE 2 DIABETES MELLITUS WITH HYPERGLYCEMIA, WITHOUT LONG-TERM CURRENT USE OF INSULIN (HCC): ICD-10-CM

## 2023-12-18 ENCOUNTER — TELEPHONE (OUTPATIENT)
Dept: ENDOCRINOLOGY | Facility: CLINIC | Age: 48
End: 2023-12-18

## 2024-01-13 DIAGNOSIS — E11.65 TYPE 2 DIABETES MELLITUS WITH HYPERGLYCEMIA, WITHOUT LONG-TERM CURRENT USE OF INSULIN (HCC): ICD-10-CM

## 2024-01-15 RX ORDER — DULAGLUTIDE 4.5 MG/.5ML
INJECTION, SOLUTION SUBCUTANEOUS
Qty: 6 ML | Refills: 0 | Status: SHIPPED | OUTPATIENT
Start: 2024-01-15 | End: 2024-01-24

## 2024-01-17 LAB
CREAT ?TM UR-SCNC: 227.1 UMOL/L
EXT ALBUMIN URINE RANDOM: 2.9
HBA1C MFR BLD HPLC: 9.6 %
MICROALBUMIN/CREAT UR: 12.8 MG/G{CREAT}

## 2024-01-24 ENCOUNTER — OFFICE VISIT (OUTPATIENT)
Dept: ENDOCRINOLOGY | Facility: CLINIC | Age: 49
End: 2024-01-24
Payer: COMMERCIAL

## 2024-01-24 VITALS
DIASTOLIC BLOOD PRESSURE: 84 MMHG | SYSTOLIC BLOOD PRESSURE: 128 MMHG | TEMPERATURE: 98 F | HEART RATE: 97 BPM | HEIGHT: 68 IN | RESPIRATION RATE: 14 BRPM | WEIGHT: 288 LBS | BODY MASS INDEX: 43.65 KG/M2 | OXYGEN SATURATION: 98 %

## 2024-01-24 DIAGNOSIS — E78.2 MIXED HYPERLIPIDEMIA: ICD-10-CM

## 2024-01-24 DIAGNOSIS — E11.65 TYPE 2 DIABETES MELLITUS WITH HYPERGLYCEMIA, WITHOUT LONG-TERM CURRENT USE OF INSULIN (HCC): Primary | ICD-10-CM

## 2024-01-24 DIAGNOSIS — E66.01 CLASS 3 SEVERE OBESITY DUE TO EXCESS CALORIES WITH SERIOUS COMORBIDITY AND BODY MASS INDEX (BMI) OF 40.0 TO 44.9 IN ADULT (HCC): ICD-10-CM

## 2024-01-24 PROBLEM — R79.89 ELEVATED TSH: Status: RESOLVED | Noted: 2018-10-10 | Resolved: 2024-01-24

## 2024-01-24 PROCEDURE — 99214 OFFICE O/P EST MOD 30 MIN: CPT | Performed by: INTERNAL MEDICINE

## 2024-01-24 PROCEDURE — 95251 CONT GLUC MNTR ANALYSIS I&R: CPT | Performed by: INTERNAL MEDICINE

## 2024-01-24 RX ORDER — INSULIN GLARGINE 300 U/ML
25 INJECTION, SOLUTION SUBCUTANEOUS
Qty: 15 ML | Refills: 0 | Status: SHIPPED | OUTPATIENT
Start: 2024-01-24

## 2024-01-24 RX ORDER — BLOOD-GLUCOSE SENSOR
1 EACH MISCELLANEOUS
Qty: 2 EACH | Refills: 0 | Status: SHIPPED | OUTPATIENT
Start: 2024-01-24

## 2024-01-24 RX ORDER — FLASH GLUCOSE SENSOR
1 KIT MISCELLANEOUS CONTINUOUS
Qty: 1 EACH | Refills: 0 | Status: SHIPPED | OUTPATIENT
Start: 2024-01-24

## 2024-01-24 NOTE — PROGRESS NOTES
"  Follow-up Patient Progress Note      CC: Type 2 diabetes, morbid obesity     History of Present Illness:   48-year-old female with type 2 diabetes, HTN, HLD, vitamin D deficiency, morbid obesity BMI 43, DJD, Hirsutism, chronic migraines, suspected ALVARADO and Rt ankle bone spur.  Last visit was 5/9/23.     Since last visit, weight is up 6 lbs.  She recently lost 2 of her grandparents leading to some increased stress eating.     Menstrual history : LMP was 4/21/23.  Appears irregular.  Never had pregnancy.     CGM data review::  Device: Shelly   Dates: 1/11/24 - 1/24/24            Usage: 28 %   Av glu: 151 mg/dL                   SD:  mg/dL            CV: 20  %  TIR: 2 %        TAR: 72+26 %   TBR: 0  %     Glycemic patters:  severe hyperglycemia. Inadequate scanning.  Hypoglycemia: No     Current meds: Reported side effects with Jardiance.  Trulicity 4.5 mg weekly  Metformin 1000 mg p.o. twice daily  Toujeo 16 units nightly     Opthamology: Yes  Podiatry: 10/22  vaccination:   Dental:  Pancreatitis: no     Ace/ARB: Lisinopril 10 mg daily  Statin: Lipitor 10 mg nightly  Thyroid issues: No    Physical Exam:  Body mass index is 43.79 kg/m².  /84 (BP Location: Left arm, Patient Position: Sitting)   Pulse 97   Temp 98 °F (36.7 °C) (Tympanic)   Resp 14   Ht 5' 8\" (1.727 m)   Wt 131 kg (288 lb)   SpO2 98%   BMI 43.79 kg/m²    Vitals:    01/24/24 1049   Weight: 131 kg (288 lb)        Physical Exam  Constitutional:       General: She is not in acute distress.     Appearance: She is well-developed. She is not ill-appearing.   HENT:      Head: Normocephalic and atraumatic.      Nose: Nose normal.      Mouth/Throat:      Pharynx: Oropharynx is clear.   Eyes:      Extraocular Movements: Extraocular movements intact.      Conjunctiva/sclera: Conjunctivae normal.   Neck:      Thyroid: No thyromegaly.   Cardiovascular:      Rate and Rhythm: Normal rate.   Pulmonary:      Effort: Pulmonary effort is normal. "   Musculoskeletal:         General: No deformity.      Cervical back: Normal range of motion.   Skin:     Capillary Refill: Capillary refill takes less than 2 seconds.      Coloration: Skin is not pale.      Findings: No rash.   Neurological:      Mental Status: She is alert and oriented to person, place, and time.   Psychiatric:         Behavior: Behavior normal.         Labs:   Lab Results   Component Value Date    HGBA1C 9.6 (H) 01/17/2024       Lab Results   Component Value Date    FPU4UFSNMBHQ 2.453 06/03/2023       Lab Results   Component Value Date    CREATININE 0.70 09/21/2023    CREATININE 0.86 12/08/2022    CREATININE 0.61 10/26/2022    BUN 9 09/21/2023    K 3.6 09/21/2023     09/21/2023    CO2 23 09/21/2023     eGFR   Date Value Ref Range Status   09/21/2023 102 ml/min/1.73sq m Final       Lab Results   Component Value Date    ALT 19 09/21/2023    AST 15 09/21/2023    ALKPHOS 60 09/21/2023       Lab Results   Component Value Date    CHOLESTEROL 118 10/26/2022    CHOLESTEROL 113 05/22/2021    CHOLESTEROL 109 08/15/2020     Lab Results   Component Value Date    HDL 42 (L) 10/26/2022    HDL 36 (L) 05/22/2021    HDL 38 (L) 08/15/2020     Lab Results   Component Value Date    TRIG 96 10/26/2022    TRIG 130 05/22/2021    TRIG 114 08/15/2020     Lab Results   Component Value Date    NONHDLC 76 10/26/2022         Assessment/Plan:    Problem List Items Addressed This Visit          Endocrine    Type 2 diabetes mellitus with hyperglycemia, without long-term current use of insulin (HCC) - Primary     She lost control due to dietary and lifestyle indiscretions.  Not responding to Trulicity any more - no reduction in appetite.  Compliant with meds.    Today we discussed options and agreed to continue metformin 1000mg po bid, increase Toujeo 25u qhs and initiate Mounjaro. Stop Trulicity due to inefficacy.    Advised to reinitiate diet and lifestyle changes.  Use cgm regularly- will switch to castro 3 to avoid need  for scanning.    Send cgm data in 6 weeks.    Follow up in 3 months.    Lab Results   Component Value Date    HGBA1C 9.6 (H) 01/17/2024            Relevant Medications    tirzepatide 7.5 MG/0.5ML    insulin glargine (Toujeo SoloStar) 300 units/mL CONCENTRATED U-300 injection pen (1-unit dial)    metFORMIN (GLUCOPHAGE) 1000 MG tablet    Continuous Blood Gluc Sensor (FreeStyle Shelly 3 Sensor) MISC    Continuous Blood Gluc  (FreeStyle Shelly [place]) BLANCHE       Other    Hyperlipidemia     Continue lipitor.         Class 3 severe obesity due to excess calories with serious comorbidity and body mass index (BMI) of 40.0 to 44.9 in adult (HCC)     Today we discussed all aspects of obesity and metabolism including pathophysiology, risk factors, complications, goal of sustaining weight loss long term, usual propensity to regain weight with short term approaches, follow up needs and medications - efficacy and limitations.   Discussed role of endocrinopathies, inflammatory conditions, sleep disorders, mental health disorders, lifestyle issues and polypharmacy and weight gain.  Briefly discussed bariatric surgery.  Diet: carb controlled diet <1500cal/day/ VLCD, 64oz fluids/day   lifestyle modifications: intermittent fasting and 10,000 steps/day  medical fitness training: muscle building  education: nutrition input         Relevant Orders    Ambulatory Referral to Medical Fitness Exercise Specialist         I have spent a total time of 32 minutes on 01/24/24 in caring for this patient including greater than 50% of this time was spent in counseling/coordination of care as listed above.       Discussed with the patient and all questioned fully answered. She will contact me with concerns.    Perry Trinh

## 2024-01-24 NOTE — ASSESSMENT & PLAN NOTE
She lost control due to dietary and lifestyle indiscretions.  Not responding to Trulicity any more - no reduction in appetite.  Compliant with meds.    Today we discussed options and agreed to continue metformin 1000mg po bid, increase Toujeo 25u qhs and initiate Mounjaro. Stop Trulicity due to inefficacy.    Advised to reinitiate diet and lifestyle changes.  Use cgm regularly- will switch to castro 3 to avoid need for scanning.    Send cgm data in 6 weeks.    Follow up in 3 months.    Lab Results   Component Value Date    HGBA1C 9.6 (H) 01/17/2024

## 2024-01-24 NOTE — ASSESSMENT & PLAN NOTE
Today we discussed all aspects of obesity and metabolism including pathophysiology, risk factors, complications, goal of sustaining weight loss long term, usual propensity to regain weight with short term approaches, follow up needs and medications - efficacy and limitations.   Discussed role of endocrinopathies, inflammatory conditions, sleep disorders, mental health disorders, lifestyle issues and polypharmacy and weight gain.  Briefly discussed bariatric surgery.  Diet: carb controlled diet <1500cal/day/ VLCD, 64oz fluids/day   lifestyle modifications: intermittent fasting and 10,000 steps/day  medical fitness training: muscle building  education: nutrition input

## 2024-01-25 ENCOUNTER — DOCUMENTATION (OUTPATIENT)
Dept: OTHER | Facility: HOSPITAL | Age: 49
End: 2024-01-25

## 2024-01-25 DIAGNOSIS — E11.65 TYPE 2 DIABETES MELLITUS WITH HYPERGLYCEMIA, WITHOUT LONG-TERM CURRENT USE OF INSULIN (HCC): Primary | ICD-10-CM

## 2024-01-26 DIAGNOSIS — Z00.6 ENCOUNTER FOR EXAMINATION FOR NORMAL COMPARISON OR CONTROL IN CLINICAL RESEARCH PROGRAM: ICD-10-CM

## 2024-01-31 ENCOUNTER — TELEPHONE (OUTPATIENT)
Dept: ENDOCRINOLOGY | Facility: CLINIC | Age: 49
End: 2024-01-31

## 2024-01-31 NOTE — TELEPHONE ENCOUNTER
Prior authorization submitted and approved via cover my meds, key [id number]. Patient notified via my chart.

## 2024-01-31 NOTE — TELEPHONE ENCOUNTER
----- Message from Keeley Moore sent at 1/30/2024  5:43 AM EST -----  Regarding: Mounjaro Prescription  Contact: [phone]  How long does it take for the authorization? I don't see it on my [employer] account.

## 2024-02-01 ENCOUNTER — APPOINTMENT (OUTPATIENT)
Dept: LAB | Facility: CLINIC | Age: 49
End: 2024-02-01
Payer: COMMERCIAL

## 2024-02-01 DIAGNOSIS — E11.65 TYPE 2 DIABETES MELLITUS WITH HYPERGLYCEMIA, WITHOUT LONG-TERM CURRENT USE OF INSULIN (HCC): ICD-10-CM

## 2024-02-01 DIAGNOSIS — Z00.6 ENCOUNTER FOR EXAMINATION FOR NORMAL COMPARISON OR CONTROL IN CLINICAL RESEARCH PROGRAM: ICD-10-CM

## 2024-02-01 LAB
ALBUMIN SERPL BCP-MCNC: 3.9 G/DL (ref 3.5–5)
ALP SERPL-CCNC: 65 U/L (ref 34–104)
ALT SERPL W P-5'-P-CCNC: 20 U/L (ref 7–52)
ANION GAP SERPL CALCULATED.3IONS-SCNC: 11 MMOL/L
AST SERPL W P-5'-P-CCNC: 14 U/L (ref 13–39)
BILIRUB SERPL-MCNC: 0.68 MG/DL (ref 0.2–1)
BUN SERPL-MCNC: 8 MG/DL (ref 5–25)
CALCIUM SERPL-MCNC: 9.1 MG/DL (ref 8.4–10.2)
CHLORIDE SERPL-SCNC: 103 MMOL/L (ref 96–108)
CHOLEST SERPL-MCNC: 107 MG/DL
CO2 SERPL-SCNC: 22 MMOL/L (ref 21–32)
CREAT SERPL-MCNC: 0.56 MG/DL (ref 0.6–1.3)
GFR SERPL CREATININE-BSD FRML MDRD: 110 ML/MIN/1.73SQ M
GLUCOSE P FAST SERPL-MCNC: 131 MG/DL (ref 65–99)
HDLC SERPL-MCNC: 37 MG/DL
LDLC SERPL CALC-MCNC: 49 MG/DL (ref 0–100)
POTASSIUM SERPL-SCNC: 3.7 MMOL/L (ref 3.5–5.3)
PROT SERPL-MCNC: 7 G/DL (ref 6.4–8.4)
SODIUM SERPL-SCNC: 136 MMOL/L (ref 135–147)
TRIGL SERPL-MCNC: 103 MG/DL

## 2024-02-01 PROCEDURE — 36415 COLL VENOUS BLD VENIPUNCTURE: CPT

## 2024-02-01 PROCEDURE — 80061 LIPID PANEL: CPT

## 2024-02-01 PROCEDURE — 80053 COMPREHEN METABOLIC PANEL: CPT

## 2024-02-06 ENCOUNTER — OFFICE VISIT (OUTPATIENT)
Dept: FAMILY MEDICINE CLINIC | Facility: CLINIC | Age: 49
End: 2024-02-06
Payer: COMMERCIAL

## 2024-02-06 VITALS
RESPIRATION RATE: 18 BRPM | WEIGHT: 291.2 LBS | DIASTOLIC BLOOD PRESSURE: 70 MMHG | HEART RATE: 102 BPM | BODY MASS INDEX: 44.13 KG/M2 | HEIGHT: 68 IN | OXYGEN SATURATION: 100 % | SYSTOLIC BLOOD PRESSURE: 122 MMHG

## 2024-02-06 DIAGNOSIS — E11.65 TYPE 2 DIABETES MELLITUS WITH HYPERGLYCEMIA, WITHOUT LONG-TERM CURRENT USE OF INSULIN (HCC): Primary | ICD-10-CM

## 2024-02-06 DIAGNOSIS — E78.2 MIXED HYPERLIPIDEMIA: ICD-10-CM

## 2024-02-06 DIAGNOSIS — Z12.11 COLON CANCER SCREENING: ICD-10-CM

## 2024-02-06 DIAGNOSIS — I10 MILD ESSENTIAL HYPERTENSION: ICD-10-CM

## 2024-02-06 DIAGNOSIS — E66.01 CLASS 3 SEVERE OBESITY DUE TO EXCESS CALORIES WITH SERIOUS COMORBIDITY AND BODY MASS INDEX (BMI) OF 40.0 TO 44.9 IN ADULT (HCC): ICD-10-CM

## 2024-02-06 DIAGNOSIS — Z23 ENCOUNTER FOR IMMUNIZATION: ICD-10-CM

## 2024-02-06 PROCEDURE — 90471 IMMUNIZATION ADMIN: CPT

## 2024-02-06 PROCEDURE — 90715 TDAP VACCINE 7 YRS/> IM: CPT

## 2024-02-06 PROCEDURE — 99214 OFFICE O/P EST MOD 30 MIN: CPT | Performed by: FAMILY MEDICINE

## 2024-02-06 NOTE — PROGRESS NOTES
Assessment/Plan:       Problem List Items Addressed This Visit        Endocrine    Type 2 diabetes mellitus with hyperglycemia, without long-term current use of insulin ([facility]) - Primary     Following with endocrine  Encouraged to continue to communicate blood sugar readings/cgm data to Dr Trinh   Reviewed labs.   Lab Results   Component Value Date    HGBA1C 9.6 (H) 01/17/2024             Cardiovascular and Mediastinum    Mild essential hypertension     Controlled on lisinopril 10 mg daily  Continue              Other    Hyperlipidemia     Continue atorvastatin   Reviewed labs.            Relevant Orders    CBC and differential    Comprehensive metabolic panel    Lipid Panel with Direct LDL reflex    Class 3 severe obesity due to excess calories with serious comorbidity and body mass index (BMI) of 40.0 to 44.9 in adult ([facility])     She is following with endocrine  Encouraged to continue working on low carb diet and keeping active/increasing steps  Hopeful that tirzepatide will help with both BS control and obesity.          Other Visit Diagnoses     Colon cancer screening        declines colonscopy, agreeable to cologuard    Relevant Orders    Cologuard    Encounter for immunization        Relevant Orders    TDAP VACCINE GREATER THAN OR EQUAL TO 6YO IM               Most recent labs reviewed       Subjective:     Keeley is a 48 y.o. female here today with chief complaint below:  Chief Complaint   Patient presents with   • Diabetes   • Chronic Conditions     F/u. Pt has no concerns     - CC above per clinical staff and reviewed.    HPI:  Pt here for f/u on chronic conditions with her .  Had labs done recently for review.    Following with endocrine- last A1C was elevated at 9.6.  she notes she has been using her CGM, sent her report to endocrine, numbers are improving.   Started on mounjaro- just took first dose yesterday.  Stopped trulicity.   Struggles with stress eating which tends to be higher  "carb.  Urine microalbumin was normal    Cholesterol- taking medication, working on diet  Hasn't been exercising as much- says she knows she needs to walk more.     Normal Bms.    Vertigo has been better.     Sees neuro this month for her headaches.     Due for eye exam    The following portions of the patient's history were reviewed and updated as appropriate: allergies, current medications, past family history, past medical history, past social history, past surgical history and problem list.    ROS:  Review of Systems       Objective:      /70 (BP Location: Left arm, Patient Position: Sitting, Cuff Size: Large)   Pulse 102   Resp 18   Ht 5' 8\" (1.727 m)   Wt 132 kg (291 lb 3.2 oz)   SpO2 100%   BMI 44.28 kg/m²   BP Readings from Last 3 Encounters:   02/06/24 122/70   01/24/24 128/84   09/21/23 122/77     Wt Readings from Last 3 Encounters:   02/06/24 132 kg (291 lb 3.2 oz)   01/24/24 131 kg (288 lb)   09/21/23 132 kg (290 lb)               Physical Exam:   Physical Exam  Vitals and nursing note reviewed.   Constitutional:       Appearance: Normal appearance. She is well-developed. She is not ill-appearing.   HENT:      Head: Normocephalic and atraumatic.   Neck:      Vascular: No carotid bruit.   Cardiovascular:      Rate and Rhythm: Normal rate and regular rhythm.      Heart sounds: Normal heart sounds. No murmur heard.  Pulmonary:      Effort: Pulmonary effort is normal. No respiratory distress.      Breath sounds: Normal breath sounds. No wheezing.   Abdominal:      Palpations: Abdomen is soft.      Tenderness: There is no abdominal tenderness. There is no guarding or rebound.   Musculoskeletal:      Cervical back: Neck supple.      Right lower leg: No edema.      Left lower leg: No edema.   Lymphadenopathy:      Cervical: No cervical adenopathy.   Skin:     General: Skin is warm and dry.   Neurological:      Mental Status: She is alert and oriented to person, place, and time.   Psychiatric:         " Mood and Affect: Mood normal.         Behavior: Behavior normal.           Depression Screening and Follow-up Plan: Patient was screened for depression during today's encounter. They screened negative with a PHQ-2 score of 0.

## 2024-02-07 ENCOUNTER — TELEPHONE (OUTPATIENT)
Dept: ENDOCRINOLOGY | Facility: CLINIC | Age: 49
End: 2024-02-07

## 2024-02-08 NOTE — ASSESSMENT & PLAN NOTE
She is following with endocrine  Encouraged to continue working on low carb diet and keeping active/increasing steps  Hopeful that tirzepatide will help with both BS control and obesity.

## 2024-02-08 NOTE — ASSESSMENT & PLAN NOTE
Following with endocrine  Encouraged to continue to communicate blood sugar readings/cgm data to Dr Trinh   Reviewed labs.   Lab Results   Component Value Date    HGBA1C 9.6 (H) 01/17/2024

## 2024-02-12 ENCOUNTER — TELEPHONE (OUTPATIENT)
Dept: NEUROLOGY | Facility: CLINIC | Age: 49
End: 2024-02-12

## 2024-02-14 ENCOUNTER — TELEPHONE (OUTPATIENT)
Dept: FAMILY MEDICINE CLINIC | Facility: CLINIC | Age: 49
End: 2024-02-14

## 2024-02-14 DIAGNOSIS — E11.65 TYPE 2 DIABETES MELLITUS WITH HYPERGLYCEMIA, WITHOUT LONG-TERM CURRENT USE OF INSULIN (HCC): ICD-10-CM

## 2024-02-14 RX ORDER — BLOOD-GLUCOSE SENSOR
1 EACH MISCELLANEOUS
Qty: 6 EACH | Refills: 1 | Status: SHIPPED | OUTPATIENT
Start: 2024-02-14

## 2024-02-14 NOTE — TELEPHONE ENCOUNTER
Please contact patient.  I received forms for FMLA, but I am not clear on what she needs this for.  I have not recommended time off of work for her health conditions.    Please call to clarify.

## 2024-02-15 DIAGNOSIS — E11.65 TYPE 2 DIABETES MELLITUS WITH HYPERGLYCEMIA, WITHOUT LONG-TERM CURRENT USE OF INSULIN (HCC): ICD-10-CM

## 2024-02-15 NOTE — TELEPHONE ENCOUNTER
Refill request sent from access team, however this medication is filled by her endocrinologist.  Sent message to patient to request from endocrine.

## 2024-02-16 DIAGNOSIS — E11.65 TYPE 2 DIABETES MELLITUS WITH HYPERGLYCEMIA, WITHOUT LONG-TERM CURRENT USE OF INSULIN (HCC): Primary | ICD-10-CM

## 2024-02-22 ENCOUNTER — OFFICE VISIT (OUTPATIENT)
Dept: NEUROLOGY | Facility: CLINIC | Age: 49
End: 2024-02-22
Payer: COMMERCIAL

## 2024-02-22 VITALS
WEIGHT: 289.3 LBS | TEMPERATURE: 98.3 F | DIASTOLIC BLOOD PRESSURE: 74 MMHG | HEIGHT: 68 IN | BODY MASS INDEX: 43.84 KG/M2 | SYSTOLIC BLOOD PRESSURE: 118 MMHG

## 2024-02-22 DIAGNOSIS — G43.109 MIGRAINE WITH AURA AND WITHOUT STATUS MIGRAINOSUS, NOT INTRACTABLE: Primary | ICD-10-CM

## 2024-02-22 PROCEDURE — 99204 OFFICE O/P NEW MOD 45 MIN: CPT

## 2024-02-22 RX ORDER — RIZATRIPTAN BENZOATE 10 MG/1
10 TABLET, ORALLY DISINTEGRATING ORAL AS NEEDED
Qty: 10 TABLET | Refills: 3 | Status: SHIPPED | OUTPATIENT
Start: 2024-02-22

## 2024-02-22 RX ORDER — MECLIZINE HCL 12.5 MG/1
TABLET ORAL AS NEEDED
COMMUNITY

## 2024-02-22 NOTE — PROGRESS NOTES
Review of Systems   Constitutional:  Negative for appetite change, fatigue and fever.   HENT: Negative.  Negative for hearing loss, tinnitus, trouble swallowing and voice change.    Eyes: Negative.  Negative for photophobia, pain and visual disturbance.   Respiratory: Negative.  Negative for shortness of breath.    Cardiovascular: Negative.  Negative for palpitations.   Gastrointestinal: Negative.  Negative for nausea and vomiting.   Endocrine: Negative.  Negative for cold intolerance.   Genitourinary: Negative.  Negative for dysuria, frequency and urgency.   Musculoskeletal:  Negative for back pain, gait problem, myalgias, neck pain and neck stiffness.   Skin: Negative.  Negative for rash.   Allergic/Immunologic: Negative.    Neurological:  Positive for headaches (Migraines. Pain behind right eye. 2-3x per month.). Negative for dizziness, tremors, seizures, syncope, facial asymmetry, speech difficulty, weakness, light-headedness and numbness.   Hematological: Negative.  Does not bruise/bleed easily.   Psychiatric/Behavioral: Negative.  Negative for confusion, hallucinations and sleep disturbance.    All other systems reviewed and are negative.

## 2024-02-22 NOTE — PROGRESS NOTES
[facility] Consult  PATIENT:  Keeley Moore  MRN:  [id number]  :  1975  DATE OF SERVICE:  2024  REFERRED BY: Self, Referral  PMD: Azucena Hart MD    Assessment/Plan:     Keeley Moore is a very pleasant 48 y.o. female with a past medical history that includes hypertension, migraines, type 2 diabetes mellitus, hyperlipidemia, obesity referred here for evaluation of headache.    Initial evaluation 2024    Migraines with aura without status migrainosus:    I had the pleasure of seeing Keeley today in the office at [facility] in [place].  She is presenting today for an initial new patient consultation in regard to headaches.  It was noted that the patient started having headaches around 20 to 25 years old.  She was having about 2-3 headache days per month.  It was noted that as far as treating the headaches go, menthol patches and Excedrin Migraine seem to work well for the headaches.  Without any medication the headaches could last anywhere between a few hours to few days.  The patient did note a significant aura of wavy/squiggly lines in her vision before the headache setting.  There was no exact timeframe from when the aura was started to and the patient will get the headache after.  Patient noted that the headaches were more frontal area of the head behind the right eye.  Was noted that the patient was having nausea, problems with concentration, photophobia, phonophobia, and lightheadedness/dizziness with the headaches.  When standing up too fast this would make the headaches worse.  No positional change headaches noted at this time.  It was noted that the patient had followed with [facility] neurology in the past a few years prior at this point.  It is noted that the patient had an MRI brain many years ago but did not have any record of this occurring.  Was noted in the past that the patient may have tried Maxalt for her headaches but  she was not sure how effective this was.  No previous preventative medications noted at this time.      In regard to my evaluation of the patient, it seems as though she was having migraines with aura.  Discussed potential imaging with the patient, although did note that since the patient has been having these headaches for many years at this point and they seem consistent with migraines we did not have to pursue an MRI brain at this time.  Advised the patient that if the symptoms got worse in the future we could certainly look into this as a possibility.  Due to the fact that the patient was only getting about 2-3 significant headache days per month, advised that we certainly did not have to pursue any preventative medications at this time.  Although did place recommendations for 400 mg daily of magnesium and riboflavin supplementation if the patient would like to try this to start as a potential preventative medication therapy.  For abortive therapy, prescribed the patient Maxalt 10 mg disintegrating tablets.  Advised the patient that she may take this with at the onset of her headache and she should try to catch this as soon as her aura as it is in for the headache.  Advised the patient that she could repeat this in 2 hours if she would need to.  Advised the patient that we would follow-up in about 6 months time to follow and evaluate her progress with the medication.    Patient Instructions:    Headache Calendar  Please maintain a headache calendar  Consider using phone applications such as Migraine Panchito or Migraine Diary    Headache/migraine treatment:     Rescue medications (for immediate treatment of a headache):   It is ok to take ibuprofen, acetaminophen or naproxen (Advil, Tylenol,  Aleve, Excedrin) if they help your headaches you should limit these to No more than 3 times a week to avoid medication overuse/rebound headaches.     For your more moderate to severe migraines take this medication early   -  Start Maxalt-MLT (rizatriptan) 10 mg disintegrating tabs - take one at the onset of headache. May repeat one time after 2 hours if pain has not resolved.   (Max 2 a day and 10 a month)       Over the counter preventive supplements for headaches/migraines (if you try, try for 3 months straight)  (to take every day to help prevent headaches - not to take at the time of headache):  There are combo pills online of these - none of which regulated by FDA and double check dosing - take appropriate dose only once a day- prevent a migraine, migravent, mind ease, migrelief   [] Magnesium 400mg daily (If any diarrhea or upset stomach, decrease dose  as tolerated)  [] Riboflavin (Vitamin B2) 400mg daily (may make your urine bright/neon yellow)    - All supplements can be purchased online    Lifestyle Recommendations:  [x] SLEEP - Maintain a regular sleep schedule: Adults need at least 7-8 hours of uninterrupted a night. Maintain good sleep hygiene:  Going to bed and waking up at consistent times, avoiding excessive daytime naps, avoiding caffeinated beverages in the evening, avoid excessive stimulation in the evening and generally using bed primarily for sleeping.  One hour before bedtime would recommend turning lights down lower, decreasing your activity (may read quietly, listen to music at a low volume). When you get into bed, should eliminate all technology (no texting, emailing, playing with your phone, iPad or tablet in bed).  [x] HYDRATION - Maintain good hydration.  Drink  2L of fluid a day (4 typical small water bottles)  [x] DIET - Maintain good nutrition. In particular don't skip meals and try and eat healthy balanced meals regularly.  [x] TRIGGERS - Look for other triggers and avoid them: Limit caffeine to 1-2 cups a day or less. Avoid dietary triggers that you have noticed bring on your headaches (this could include aged cheese, peanuts, MSG, aspartame and nitrates).  [x] EXERCISE - physical exercise as we all know  is good for you in many ways, and not only is good for your heart, but also is beneficial for your mental health, cognitive health and  chronic pain/headaches. I would encourage at the least 5 days of physical exercise weekly for at least 30 minutes.     Education and Follow-up  [x] Please call with any questions or concerns. Of course if any new concerning symptoms go to the emergency department.  [x] Follow up with 6 months with Alonzo NAJERA        CC:   We had the pleasure of evaluating Keeley Moore in neurological consultation today. Keeley Moore is a  48 y.o. female who presents today for evaluation of headaches.     History obtained from patient as well as available medical record review.  History of Present Illness:   Current medical illnesses  or past medical history include hypertension, migraines, type 2 diabetes mellitus, hyperlipidemia, obesity      Interval History:    Headaches started at what age? [age] years old  How often do the headaches occur?   - as of 2/22/2024: 2-3 headache days per month  What time of the day do the headaches start?  No particular time of day   How long do the headaches last? Menthol patches for migraines and Excedrin migraine will seem to work sometimes for the headache. Without medication, can last a few hours to a few days.  Are you ever headache free? Yes    Aura? with aura, wavy or squiggly lines in her vision before the migraines seem to set in. Does not have an exact time frame      Where is your headache located and pain quality? Frontal and more behind the right eye. Pounding and pressure.   What is the intensity of pain? Worst 10/10, Average: 5/10  Associated symptoms:   [x] Nausea    [x] Problems with concentration  [x] Photophobia     [x]Phonophobia (sometimes)  [x] Prefer quiet, cold, dark room  [x] Light-headed or dizzy        Things that make the headache worse? Standing up too fast makes the headache worse     Any positional change headaches? No positional  change headaches    Headache triggers:  strong odors, chemical smells , spices, stress    Have you seen someone else for headaches or pain? Yes, many years prior at this point in time. She was noted to see [facility] neurology in the past.  Have you had trigger point injection performed and how often? No  Have you had Botox injection performed and how often? No   Have you had epidural injections or transforaminal injections performed? No  Are you current pregnant or planning on getting pregnant? Perimenopausal age, no pregnancy required and has not been using any contraception.   Have you ever had any Brain imaging? Yes, MRI brain many years prior. No record of this occurring.     Last eye exam: Has been due for an eye exam ever since last year she states. Does need her diabetic eye exam as well. Slight astigmatism as well she states.     What medications do you take or have you taken for your headaches?   ABORTIVE:    OTC medications: Excedrin migraine,  Prescription: naproxen     Past/ failed/contraindicated:  OTC medications: Tylenol   Prescription: Maxalt (not sure how effective it was)    PREVENTIVE:   None    Past/ failed/contraindicated:  None       LIFESTYLE  Sleep   - averages: about 6 hours of sleep at night  Problems falling asleep?:   No  Problems staying asleep?:  No    - She is currently recommended to take a sleep study at this point in time. She is trying to decide if she wants to go get it done or not.     Physical activity: Limited activity and exercise throughout the week, she has bad ankles and bad knees she states. More walking at work     Water: 64 ounces of water per day  Caffeine: does work for [employer] she states. Hard to stop drinking soda for herself.     Mood: Denies history of anxiety or depression or other diagnosed mood disorder    The following portions of the patient's history were reviewed and updated as appropriate: allergies, current medications, past family history, past medical  history, past social history, past surgical history and problem list.    Pertinent family history:  Family history of headaches:  no known family members with significant headaches  Any family history of aneurysms - Yes, maternal grandmother but did not die of an actual aneurysm     Pertinent social history:  Work: works as a  for [employer]   Education: Master's in HR management   Lives with      Illicit Drugs: denies  Alcohol/tobacco: Denies alcohol use, Denies tobacco use    Past Medical History:     Past Medical History:   Diagnosis Date    Arthritis     Benign essential hypertension     Resolved 8/16/2016     Diabetes mellitus ([facility])     Diabetes mellitus type 2, controlled ([facility])     Dyspnea and respiratory abnormality     Resolved 2/9/2015     Headache(784.0)     Migraine     Varicella     Vertigo        Patient Active Problem List   Diagnosis    Type 2 diabetes mellitus with hyperglycemia, without long-term current use of insulin ([facility])    Hyperlipidemia    Mild essential hypertension    Right wrist tendonitis    Class 3 severe obesity due to excess calories with serious comorbidity and body mass index (BMI) of 40.0 to 44.9 in adult ([facility])    Primary osteoarthritis of left knee    Migraines       Medications:      Current Outpatient Medications   Medication Sig Dispense Refill    atorvastatin (LIPITOR) 10 mg tablet TAKE 1 TABLET DAILY 90 tablet 3    BD Pen Needle Sindhu 2nd Gen 32G X 4 MM MISC USE ONE PEN NEEDLE DAILY 100 each 6    Continuous Blood Gluc  (FreeStyle Shelly [place]) BLANCHE Use 1 Units continuous 1 each 0    Continuous Blood Gluc Sensor (FreeStyle Shelly 3 Sensor) MISC Use 1 Units every 14 (fourteen) days 6 each 1    insulin glargine (Toujeo SoloStar) 300 units/mL CONCENTRATED U-300 injection pen (1-unit dial) Inject 25 Units under the skin daily at bedtime 15 mL 0    lisinopril (ZESTRIL) 10 mg tablet TAKE 1 TABLET DAILY FOR BLOOD PRESSURE 90 tablet 3    meclizine (ANTIVERT) 12.5 MG  tablet Take by mouth if needed for dizziness      metFORMIN (GLUCOPHAGE) 1000 MG tablet Take 1 tablet (1,000 mg total) by mouth 2 (two) times a day with meals 180 tablet 1    naproxen (NAPROSYN) 500 mg tablet Take 1 tablet (500 mg total) by mouth daily as needed for moderate pain (not with meloxicam)      tirzepatide 10 MG/0.5ML Inject 0.5 mL (10 mg total) under the skin every 7 days 6 mL 0     No current facility-administered medications for this visit.        Allergies:      Allergies   Allergen Reactions    Hydrocodone-Acetaminophen      Vicodin--Other reaction(s): BREATHING WAS FUNNY  Tingling sensation in mouth       Family History:     Family History   Problem Relation Age of Onset    Diabetes unspecified Mother     Diabetes Mother     Lupus Mother     Diabetes type II Mother     Cancer Mother     Heart murmur Father     Other Father         Right leg amputation     Diabetes type II Father     Diabetes unspecified Sister     Diabetes Sister     Cancer Maternal Grandmother         Skin, also maybe breast.   age 80s.    No Known Problems Maternal Grandfather         mild dementia, 80s    Breast cancer Paternal Grandmother         50-60s,  age 92    Cancer Paternal Grandmother     Diabetes unspecified Paternal Grandfather     Arrhythmia Paternal Grandfather     Heart attack Paternal Grandfather     Heart disease Paternal Grandfather          age 70 after pacemaker placement        Social History:       Social History     Socioeconomic History    Marital status: /Civil Union     Spouse name: Eron     Number of children: Not on file    Years of education: Not on file    Highest education level: Not on file   Occupational History    Not on file   Tobacco Use    Smoking status: Never    Smokeless tobacco: Never   Vaping Use    Vaping status: Never Used   Substance and Sexual Activity    Alcohol use: Not Currently    Drug use: Never    Sexual activity: Yes     Partners: Male     Birth  "control/protection: None   Other Topics Concern    Not on file   Social History Narrative    Not on file     Social Determinants of Health     Financial Resource Strain: Not on file   Food Insecurity: Not on file   Transportation Needs: Not on file   Physical Activity: Not on file   Stress: Not on file   Social Connections: Not on file   Intimate Partner Violence: Not on file   Housing Stability: Not on file         Objective:     Physical Exam:                                                                 Vitals:            Constitutional:    /74 (BP Location: Left arm, Patient Position: Sitting, Cuff Size: Adult)   Temp 98.3 °F (36.8 °C) (Temporal)   Ht 5' 8\" (1.727 m)   Wt 131 kg (289 lb 4.8 oz)   BMI 43.99 kg/m²   BP Readings from Last 3 Encounters:   02/22/24 118/74   02/06/24 122/70   01/24/24 128/84     Pulse Readings from Last 3 Encounters:   02/06/24 102   01/24/24 97   09/21/23 83         Well developed, well nourished, well groomed. No dysmorphic features.       Psychiatric:  Normal behavior and appropriate affect        Neurological Examination:     Mental status/cognitive function:   Orientated to time, place and person. Recent and remote memory intact. Attention span and concentration as well as fund of knowledge are appropriate for age. Normal language and spontaneous speech.    Cranial Nerves:  II-visual fields full.   III, IV, VI-Pupils were equal, round, and reactive to light and accomodation. Extraocular movements were full and conjugate without nystagmus. Conjugate gaze, normal smooth pursuits, normal saccades   V-facial sensation symmetric.    VII-facial expression symmetric, intact forehead wrinkle, strong eye closure, symmetric smile    VIII-hearing grossly intact bilaterally   IX, X-palate elevation symmetric, no dysarthria.   XI-shoulder shrug strength intact    XII-tongue protrusion midline.    Motor Exam: symmetric bulk and tone throughout, no pronator drift. Power/strength 5/5 " bilateral upper and lower extremities, no atrophy, fasciculations or abnormal movements noted.   Sensory: grossly intact light touch in all extremities.   Reflexes: brachioradialis 2+, biceps 2+, knee 2+ bilaterally  Coordination: Finger nose finger intact bilaterally, no apparent dysmetria, ataxia or tremor noted  Gait: steady casual and tandem gait.         Pertinent Imaging: No pertinent imaging at this time          Review of Systems:     Review of Systems   Constitutional:  Negative for appetite change, fatigue and fever.   HENT: Negative.  Negative for hearing loss, tinnitus, trouble swallowing and voice change.    Eyes: Negative.  Negative for photophobia, pain and visual disturbance.   Respiratory: Negative.  Negative for shortness of breath.    Cardiovascular: Negative.  Negative for palpitations.   Gastrointestinal: Negative.  Negative for nausea and vomiting.   Endocrine: Negative.  Negative for cold intolerance.   Genitourinary: Negative.  Negative for dysuria, frequency and urgency.   Musculoskeletal:  Negative for back pain, gait problem, myalgias, neck pain and neck stiffness.   Skin: Negative.  Negative for rash.   Allergic/Immunologic: Negative.    Neurological:  Positive for headaches (Migraines. Pain behind right eye. 2-3x per month.). Negative for dizziness, tremors, seizures, syncope, facial asymmetry, speech difficulty, weakness, light-headedness and numbness.   Hematological: Negative.  Does not bruise/bleed easily.   Psychiatric/Behavioral: Negative.  Negative for confusion, hallucinations and sleep disturbance.    All other systems reviewed and are negative.       I have spent 50 minutes with the patient today in which greater than 50% of this time was spent in counseling/coordination of care regarding Risks and benefits of tx options, Instructions for management, Patient and family education, Importance of tx compliance, Risk factor reductions, Impressions, Counseling / Coordination of  care, Documenting in the medical record, Reviewing / ordering tests, medicine, procedures  , and Obtaining or reviewing history  . I also spent 15 minutes non face to face for this patient the same day.     Activity Minutes   Precharting/reviewing 5   Patient care/counseling 50   Postcharting/care coordination 10       Author:  Jai Johnson PA-C 2/22/2024 12:11 PM

## 2024-02-22 NOTE — PATIENT INSTRUCTIONS
Patient Instructions:    Headache Calendar  Please maintain a headache calendar  Consider using phone applications such as Migraine Panchito or Migraine Diary    Headache/migraine treatment:     Rescue medications (for immediate treatment of a headache):   It is ok to take ibuprofen, acetaminophen or naproxen (Advil, Tylenol,  Aleve, Excedrin) if they help your headaches you should limit these to No more than 3 times a week to avoid medication overuse/rebound headaches.     For your more moderate to severe migraines take this medication early   - Start Maxalt-MLT (rizatriptan) 10 mg disintegrating tabs - take one at the onset of headache. May repeat one time after 2 hours if pain has not resolved.   (Max 2 a day and 9 a month)       Over the counter preventive supplements for headaches/migraines (if you try, try for 3 months straight)  (to take every day to help prevent headaches - not to take at the time of headache):  There are combo pills online of these - none of which regulated by FDA and double check dosing - take appropriate dose only once a day- prevent a migraine, migravent, mind ease, migrelief   [] Magnesium 400mg daily (If any diarrhea or upset stomach, decrease dose  as tolerated)  [] Riboflavin (Vitamin B2) 400mg daily (may make your urine bright/neon yellow)    - All supplements can be purchased online    Lifestyle Recommendations:  [x] SLEEP - Maintain a regular sleep schedule: Adults need at least 7-8 hours of uninterrupted a night. Maintain good sleep hygiene:  Going to bed and waking up at consistent times, avoiding excessive daytime naps, avoiding caffeinated beverages in the evening, avoid excessive stimulation in the evening and generally using bed primarily for sleeping.  One hour before bedtime would recommend turning lights down lower, decreasing your activity (may read quietly, listen to music at a low volume). When you get into bed, should eliminate all technology (no texting, emailing,  playing with your phone, iPad or tablet in bed).  [x] HYDRATION - Maintain good hydration.  Drink  2L of fluid a day (4 typical small water bottles)  [x] DIET - Maintain good nutrition. In particular don't skip meals and try and eat healthy balanced meals regularly.  [x] TRIGGERS - Look for other triggers and avoid them: Limit caffeine to 1-2 cups a day or less. Avoid dietary triggers that you have noticed bring on your headaches (this could include aged cheese, peanuts, MSG, aspartame and nitrates).  [x] EXERCISE - physical exercise as we all know is good for you in many ways, and not only is good for your heart, but also is beneficial for your mental health, cognitive health and  chronic pain/headaches. I would encourage at the least 5 days of physical exercise weekly for at least 30 minutes.     Education and Follow-up  [x] Please call with any questions or concerns. Of course if any new concerning symptoms go to the emergency department.  [x] Follow up with 6 months with Alonzo NAJERA

## 2024-02-26 DIAGNOSIS — E11.65 TYPE 2 DIABETES MELLITUS WITH HYPERGLYCEMIA, WITHOUT LONG-TERM CURRENT USE OF INSULIN (HCC): ICD-10-CM

## 2024-02-27 LAB — COLOGUARD RESULT REPORTABLE: NEGATIVE

## 2024-03-03 LAB
APOB+LDLR+PCSK9 GENE MUT ANL BLD/T: NOT DETECTED
BRCA1+BRCA2 DEL+DUP + FULL MUT ANL BLD/T: NOT DETECTED
MLH1+MSH2+MSH6+PMS2 GN DEL+DUP+FUL M: NOT DETECTED

## 2024-03-12 ENCOUNTER — OFFICE VISIT (OUTPATIENT)
Dept: ENDOCRINOLOGY | Facility: CLINIC | Age: 49
End: 2024-03-12
Payer: COMMERCIAL

## 2024-03-12 VITALS
DIASTOLIC BLOOD PRESSURE: 70 MMHG | SYSTOLIC BLOOD PRESSURE: 114 MMHG | HEART RATE: 85 BPM | BODY MASS INDEX: 43.5 KG/M2 | HEIGHT: 68 IN | OXYGEN SATURATION: 98 % | RESPIRATION RATE: 16 BRPM | TEMPERATURE: 97.9 F | WEIGHT: 287 LBS

## 2024-03-12 DIAGNOSIS — E11.65 TYPE 2 DIABETES MELLITUS WITH HYPERGLYCEMIA, WITHOUT LONG-TERM CURRENT USE OF INSULIN (HCC): Primary | ICD-10-CM

## 2024-03-12 DIAGNOSIS — E78.2 MIXED HYPERLIPIDEMIA: ICD-10-CM

## 2024-03-12 DIAGNOSIS — E66.01 CLASS 3 SEVERE OBESITY DUE TO EXCESS CALORIES WITH SERIOUS COMORBIDITY AND BODY MASS INDEX (BMI) OF 40.0 TO 44.9 IN ADULT (HCC): ICD-10-CM

## 2024-03-12 DIAGNOSIS — E55.9 VITAMIN D DEFICIENCY: ICD-10-CM

## 2024-03-12 PROCEDURE — 99214 OFFICE O/P EST MOD 30 MIN: CPT | Performed by: INTERNAL MEDICINE

## 2024-03-12 PROCEDURE — 95251 CONT GLUC MNTR ANALYSIS I&R: CPT | Performed by: INTERNAL MEDICINE

## 2024-03-12 NOTE — ASSESSMENT & PLAN NOTE
She is acceptable control and improving. GMI for last 2 weeks was 7.4% compared to A1c 9.6% recent past.    Today we discussed options and agreed to continue metformin 1000mg po bid, increase Toujeo 25u qhs and increase Mounjaro.    Reiterated diet and lifestyle changes.    Send cgm data in 6 weeks.    Follow up in 6 months.    Lab Results   Component Value Date    HGBA1C 9.6 (H) 01/17/2024

## 2024-03-12 NOTE — ASSESSMENT & PLAN NOTE
Weight is plateaued. We agreed to pursue lifestyle changes including diet, exercise and fasting.    Increase mounjaro 15mg weekly.    Follow up in 6 months.

## 2024-03-12 NOTE — PROGRESS NOTES
"  Follow-up Patient Progress Note      CC: Type 2 diabetes, morbid obesity     History of Present Illness:   48-year-old female with type 2 diabetes, HTN, HLD, vitamin D deficiency, morbid obesity BMI 43, DJD, Hirsutism, chronic migraines, suspected ALVARADO and Rt ankle bone spur.  Last visit was 1/24/24.     Since last visit, weight is same.     Menstrual history : LMP was 4/21/23.  Appears irregular.  Never had pregnancy.     CGM data review::  Device: Shelly   Dates: 2/28/24 - 3/12/24            Usage: 28 %   Av glu: 170 mg/dL                   SD:  mg/dL            CV: 17  % GMI: 7.4%  TIR: 71 %        TAR: 27+2 %   TBR: 0  %     Glycemic patters:  severe hyperglycemia. Inadequate scanning.  Hypoglycemia: No     Current meds: Reported side effects with Jardiance.  Mounjaro 10mg weekly - started 8 weeks ago  Metformin 1000 mg p.o. twice daily  Toujeo 25 units nightly     Opthamology: Yes  Podiatry: 10/22  vaccination:   Dental:  Pancreatitis: no     Ace/ARB: Lisinopril 10 mg daily  Statin: Lipitor 10 mg nightly  Thyroid issues: No      Physical Exam:  Body mass index is 43.64 kg/m².  /70 (BP Location: Right arm, Patient Position: Sitting)   Pulse 85   Temp 97.9 °F (36.6 °C) (Tympanic)   Resp 16   Ht 5' 8\" (1.727 m)   Wt 130 kg (287 lb)   SpO2 98%   BMI 43.64 kg/m²    Vitals:    03/12/24 1239   Weight: 130 kg (287 lb)        Physical Exam  Constitutional:       General: She is not in acute distress.     Appearance: She is well-developed.   HENT:      Head: Normocephalic and atraumatic.      Nose: Nose normal.   Eyes:      Conjunctiva/sclera: Conjunctivae normal.   Pulmonary:      Effort: Pulmonary effort is normal.   Abdominal:      General: There is no distension.   Musculoskeletal:      Cervical back: Normal range of motion and neck supple.   Skin:     Findings: No rash.      Comments: No icterus   Neurological:      Mental Status: She is alert and oriented to person, place, and time.       Labs:   Lab " Results   Component Value Date    HGBA1C 9.6 (H) 01/17/2024       Lab Results   Component Value Date    EFW7HQFLBNAM 2.453 06/03/2023    TSH 2.14 01/18/2021       Lab Results   Component Value Date    CREATININE 0.56 (L) 02/01/2024    CREATININE 0.70 09/21/2023    CREATININE 0.73 03/17/2023    BUN 8 02/01/2024    K 3.7 02/01/2024     02/01/2024    CO2 22 02/01/2024     GFR, Calculated   Date Value Ref Range Status   05/23/2019 106 >60 mL/min/1.73m2 Final     Comment:     mL/min per 1.73 square meters                                            Normal Function or Mild Renal    Disease (if clinically at risk):  >or=60  Moderately Decreased:                30-59  Severely Decreased:                  15-29  Renal Failure:                         <15                                            -American GFR: multiply reported GFR by 1.16    Please note that the eGFR is based on the CKD-EPI calculation, and is not intended to be used for drug dosing.                                            Note: Calculated GFR may not be an accurate indicator of renal function if the patient's renal function is not in a steady state.     eGFRcr   Date Value Ref Range Status   03/17/2023 102 >59 Final     eGFR   Date Value Ref Range Status   02/01/2024 110 ml/min/1.73sq m Final       Lab Results   Component Value Date    ALT 20 02/01/2024    AST 14 02/01/2024    ALKPHOS 65 02/01/2024       Lab Results   Component Value Date    CHOLESTEROL 107 02/01/2024    CHOLESTEROL 118 10/26/2022    CHOLESTEROL 113 05/22/2021     Lab Results   Component Value Date    HDL 37 (L) 02/01/2024    HDL 42 (L) 10/26/2022    HDL 36 (L) 05/22/2021     Lab Results   Component Value Date    TRIG 103 02/01/2024    TRIG 96 10/26/2022    TRIG 130 05/22/2021     Lab Results   Component Value Date    NONHDLC 76 10/26/2022         Assessment/Plan:    Problem List Items Addressed This Visit          Endocrine    Type 2 diabetes mellitus with hyperglycemia,  without long-term current use of insulin (HCC) - Primary     She is acceptable control and improving. GMI for last 2 weeks was 7.4% compared to A1c 9.6% recent past.    Today we discussed options and agreed to continue metformin 1000mg po bid, increase Toujeo 25u qhs and increase Mounjaro.    Reiterated diet and lifestyle changes.    Send cgm data in 6 weeks.    Follow up in 6 months.    Lab Results   Component Value Date    HGBA1C 9.6 (H) 01/17/2024            Relevant Medications    tirzepatide 15 MG/0.5ML    Other Relevant Orders    Hemoglobin A1C    Albumin / creatinine urine ratio       Other    Hyperlipidemia     Continue Lipitor 10mg QHS         Class 3 severe obesity due to excess calories with serious comorbidity and body mass index (BMI) of 40.0 to 44.9 in adult ([facility])     Weight is plateaued. We agreed to pursue lifestyle changes including diet, exercise and fasting.    Increase mounjaro 15mg weekly.    Follow up in 6 months.          Other Visit Diagnoses       Vitamin D deficiency        Relevant Orders    Vitamin D 25 hydroxy              I have spent a total time of 32 minutes on 03/12/24 in caring for this patient including greater than 50% of this time was spent in counseling/coordination of care as listed above.       Discussed with the patient and all questioned fully answered. She will contact me with concerns.    Perry Trinh

## 2024-04-05 DIAGNOSIS — E11.65 TYPE 2 DIABETES MELLITUS WITH HYPERGLYCEMIA, WITHOUT LONG-TERM CURRENT USE OF INSULIN (HCC): ICD-10-CM

## 2024-04-05 RX ORDER — INSULIN GLARGINE 300 U/ML
25 INJECTION, SOLUTION SUBCUTANEOUS
Qty: 13.5 ML | Refills: 1 | Status: SHIPPED | OUTPATIENT
Start: 2024-04-05

## 2024-04-18 ENCOUNTER — PATIENT MESSAGE (OUTPATIENT)
Dept: NEUROLOGY | Facility: CLINIC | Age: 49
End: 2024-04-18

## 2024-06-24 DIAGNOSIS — E78.5 HYPERLIPIDEMIA, UNSPECIFIED HYPERLIPIDEMIA TYPE: ICD-10-CM

## 2024-06-24 DIAGNOSIS — I10 MILD ESSENTIAL HYPERTENSION: ICD-10-CM

## 2024-06-24 RX ORDER — LISINOPRIL 10 MG/1
TABLET ORAL
Qty: 90 TABLET | Refills: 1 | Status: SHIPPED | OUTPATIENT
Start: 2024-06-24

## 2024-06-24 RX ORDER — ATORVASTATIN CALCIUM 10 MG/1
TABLET, FILM COATED ORAL
Qty: 90 TABLET | Refills: 1 | Status: SHIPPED | OUTPATIENT
Start: 2024-06-24

## 2024-07-05 ENCOUNTER — OFFICE VISIT (OUTPATIENT)
Dept: FAMILY MEDICINE CLINIC | Facility: CLINIC | Age: 49
End: 2024-07-05
Payer: COMMERCIAL

## 2024-07-05 VITALS
TEMPERATURE: 97.5 F | SYSTOLIC BLOOD PRESSURE: 96 MMHG | RESPIRATION RATE: 18 BRPM | WEIGHT: 281.6 LBS | BODY MASS INDEX: 42.68 KG/M2 | OXYGEN SATURATION: 99 % | HEIGHT: 68 IN | DIASTOLIC BLOOD PRESSURE: 60 MMHG | HEART RATE: 92 BPM

## 2024-07-05 DIAGNOSIS — H81.13 BENIGN PAROXYSMAL POSITIONAL VERTIGO DUE TO BILATERAL VESTIBULAR DISORDER: Primary | ICD-10-CM

## 2024-07-05 PROCEDURE — 99213 OFFICE O/P EST LOW 20 MIN: CPT | Performed by: FAMILY MEDICINE

## 2024-07-05 NOTE — PATIENT INSTRUCTIONS
Refer to vestibular PT  Meclizine as needed  Call if symptoms don't get better with PT or if symptoms worsen

## 2024-07-05 NOTE — PROGRESS NOTES
"Ambulatory Visit  Name: Keeley Moore      : 1975      MRN: [id number]  Encounter Provider: Blessing Richard DO  Encounter Date: 2024   Encounter department: [facility]    Assessment & Plan   1. Benign paroxysmal positional vertigo due to bilateral vestibular disorder  Comments:  suspect worsening of her bbpv  shouldn't drive many hours given symptoms   refer to PT  call if sx don't improve with PT  Orders:  -     Ambulatory Referral to Physical Therapy; Future       History of Present Illness     HPI  Pt c/o dizziness (room spinning) which occurs whenever she changes positions.  +hx of vertigo but this is worse than usual.  Usually episodic.  Antivert hasn't helped.  No ear pain currently.  Has done vestibular rehab in the past which was helpful.  No sinus congestion.  Has done PT in the past.  Doesn't want guicho/hallpike maneuver.  Wants to know whether she should drive many hours on upcoming vacation given sx.  No headache.  No visual changes, weakness, falls, imbalance, vomiting.  Sugars have been appropriate.        Review of Systems  See hpi; all other systems negative      Objective     BP 96/60 (BP Location: Left arm, Patient Position: Standing, Cuff Size: Large)   Pulse 92   Temp 97.5 °F (36.4 °C)   Resp 18   Ht 5' 8\" (1.727 m)   Wt 128 kg (281 lb 9.6 oz)   SpO2 99%   BMI 42.82 kg/m²     Physical Exam  Constitutional:       Appearance: Normal appearance.   HENT:      Head: Normocephalic and atraumatic.      Right Ear: Tympanic membrane, ear canal and external ear normal.      Left Ear: Tympanic membrane, ear canal and external ear normal.      Nose: Nose normal. No congestion.      Mouth/Throat:      Mouth: Mucous membranes are moist.      Pharynx: No oropharyngeal exudate or posterior oropharyngeal erythema.   Eyes:      General: No visual field deficit.     Extraocular Movements: Extraocular movements intact.      Conjunctiva/sclera: Conjunctivae normal.      Pupils: " Pupils are equal, round, and reactive to light.   Neck:      Vascular: No carotid bruit.   Cardiovascular:      Rate and Rhythm: Normal rate and regular rhythm.      Heart sounds: No murmur heard.     No friction rub. No gallop.   Pulmonary:      Effort: Pulmonary effort is normal.      Breath sounds: No wheezing, rhonchi or rales.   Abdominal:      General: Abdomen is flat. There is no distension.      Palpations: Abdomen is soft.      Tenderness: There is no abdominal tenderness.   Musculoskeletal:      Cervical back: Neck supple.   Lymphadenopathy:      Cervical: No cervical adenopathy.   Neurological:      General: No focal deficit present.      Mental Status: She is alert and oriented to person, place, and time.      Cranial Nerves: No cranial nerve deficit.      Motor: No weakness, tremor, abnormal muscle tone or pronator drift.      Coordination: Romberg sign negative. Coordination normal. Finger-Nose-Finger Test normal. Rapid alternating movements normal.      Deep Tendon Reflexes: Reflexes normal.       Administrative Statements            no concerns

## 2024-07-15 ENCOUNTER — EVALUATION (OUTPATIENT)
Dept: PHYSICAL THERAPY | Facility: CLINIC | Age: 49
End: 2024-07-15
Payer: COMMERCIAL

## 2024-07-15 DIAGNOSIS — H81.13 BENIGN PAROXYSMAL POSITIONAL VERTIGO DUE TO BILATERAL VESTIBULAR DISORDER: Primary | ICD-10-CM

## 2024-07-15 PROCEDURE — 97162 PT EVAL MOD COMPLEX 30 MIN: CPT | Performed by: PHYSICAL THERAPIST

## 2024-07-15 NOTE — PROGRESS NOTES
PT Evaluation     Today's date: 7/15/2024  Patient name: Keeley Moore  : 1975  MRN: [id number]  Referring provider: Blessing Richard DO  Dx:   Encounter Diagnosis     ICD-10-CM    1. Benign paroxysmal positional vertigo due to bilateral vestibular disorder  H81.13 Ambulatory Referral to Physical Therapy    suspect worsening of her bbpv  shouldn't drive many hours given symptoms   refer to PT  call if sx don't improve with PT                     Assessment    Assessment details: Pt presents with s/s consistent with R posterior canal BPPV. She will benefit from skilled PT services to address her impairments in order to resolve vertigo and restore function.  Understanding of Dx/Px/POC: good     Prognosis: good    Goals  STG - 1-2 wks  1) pt will demonstrate (-) guicho-hallpike and roll test  2) pt will improve vertigo > 50%    LTG - 2-3 wks  1) pt will resolve vertigo  2) pt will resolve functional limitations.    Plan  Patient would benefit from: skilled physical therapy    Planned therapy interventions: manual therapy, balance, body mechanics training, neuromuscular re-education, patient/caregiver education, postural training, therapeutic activities, therapeutic exercise, home exercise program, graded activity, functional ROM exercises and canalith repositioning    Frequency: 2x week  Duration in weeks: 4  Treatment plan discussed with: patient        Subjective Evaluation    History of Present Illness  Mechanism of injury: Pt is a 49 y.o. female with PMHx significant for DM II, obesity, R knee scope, HTN, migraines. She reports insidious onset of vertigo 2.5 wks ago. She denies red flags, changes in balance.  Patient Goals  Patient goal: resolve vertigo  Pain  Exacerbated by: supine-to-sit/sit-to-supine, walking.  Progression: no change      Diagnostic Tests  No diagnostic tests performed  Treatments  No previous or current treatments        Objective     Concurrent Complaints  Positive for dizziness and aural  "fullness (\"water on the ear\"). Negative for faints, headaches, nausea/motion sickness, tinnitus, visual change, history of cancer, history of trauma, infection, hearing loss, memory loss, poor concentration and peripheral neuropathy    Active Range of Motion   Cervical/Thoracic Spine       Cervical  Subcranial protraction:  WFL   Subcranial retraction:   Restriction level: minimal  Flexion:  WFL  Extension: Neck active extension: produced dizziness.     Restriction level: minimal  Left lateral flexion:  Restriction level: minimal  Right lateral flexion:  Restriction level minimal  Left rotation:  Restriction level: minimal  Right rotation:  Restriction level: minimal  Mechanical Assessment    Cervical    Seated Protrusion: repeated movements   Pain level: produced  no worse  Seated retraction: repeated movements   Pain intensity: better  Pain level: abolished    Thoracic      Lumbar    Neuro Exam:     Dizziness  Positive for vertigo and light-headedness.   Negative for disequilibrium, oscillopsia, motion sickness, rocking or swaying, diplopia and floating or swimming.     Exacerbating factors  Positive for rolling in bed, walking, supine to/from sitting and walking in busy environment.   Negative for looking up, turning head and optokinetic movement.     Symptoms   Intensity at best: 0/10  Intensity at worst: 10/10    Headaches   Patient reports headaches: No.     Cervical exam   Modified VBI   Left: asymptomatic  Right: asymptomatic    Positional testing   Selkirk-Hallpike   Left posterior canal: WNL  Right posterior canal: symptomatic and torsional  Roll test   Left horizontal canal: WNL  Right horizontal canal: symptomatic  Positional testing comment: FT EC 4 sec  SLS EO R 20 sec L 15 sec    R BBQ maneuver P,W  R epley maneuver P,D,B             Precautions: PMHx: DM II, obesity, R knee scope, HTN, migraines   Dx: R post canal BPPV    Manuals 7/15            R epley maneuver x2                                         "           Neuro Re-Ed             Self R epley maneuver HEP            Postural correction and awareness training             Rep RET seated             Venkata exercise                                                    Ther Ex                                                                                                                     Ther Activity             STS                          Gait Training                                       Modalities

## 2024-07-16 DIAGNOSIS — E11.65 TYPE 2 DIABETES MELLITUS WITH HYPERGLYCEMIA, WITHOUT LONG-TERM CURRENT USE OF INSULIN (HCC): ICD-10-CM

## 2024-07-17 RX ORDER — BLOOD-GLUCOSE SENSOR
EACH MISCELLANEOUS
Qty: 6 EACH | Refills: 3 | Status: SHIPPED | OUTPATIENT
Start: 2024-07-17

## 2024-07-18 ENCOUNTER — OFFICE VISIT (OUTPATIENT)
Dept: PHYSICAL THERAPY | Facility: CLINIC | Age: 49
End: 2024-07-18
Payer: COMMERCIAL

## 2024-07-18 DIAGNOSIS — H81.13 BENIGN PAROXYSMAL POSITIONAL VERTIGO DUE TO BILATERAL VESTIBULAR DISORDER: Primary | ICD-10-CM

## 2024-07-18 PROCEDURE — 97140 MANUAL THERAPY 1/> REGIONS: CPT | Performed by: PHYSICAL THERAPIST

## 2024-07-18 PROCEDURE — 97112 NEUROMUSCULAR REEDUCATION: CPT | Performed by: PHYSICAL THERAPIST

## 2024-07-18 NOTE — PROGRESS NOTES
Daily Note     Today's date: 2024  Patient name: Keeley Moore  : 1975  MRN: [id number]  Referring provider: Blessing Richard DO  Dx:   Encounter Diagnosis     ICD-10-CM    1. Benign paroxysmal positional vertigo due to bilateral vestibular disorder  H81.13                      Subjective: Pt reports significant reduction in vertigo since last visit, notes some baseline dizziness.    Objective: See treatment diary below    Rep RET seated abolishes, better    Added Rep RET seated 1x20 5xD to HEP    Assessment: Pt demonstrated an upper cervical retraction DP indicating a cervicogenic component to her dizziness vs vertigo. Pt demonstrate less nystagmus and intensity of vertigo with R guicho-hallpike.    Plan: Assess response nv.       Precautions: PMHx: DM II, obesity, R knee scope, HTN, migraines   Dx: R post canal BPPV    Manuals 7/15 7/18           L epley maneuver x2 x2                                                  Neuro Re-Ed             Self R epley maneuver HEP            Postural correction and awareness training  10 min           Rep RET seated  3x10           Venkata exercise                                                    Ther Ex                                                                                                                     Ther Activity             STS                          Gait Training                                       Modalities

## 2024-07-20 ENCOUNTER — APPOINTMENT (OUTPATIENT)
Dept: LAB | Facility: CLINIC | Age: 49
End: 2024-07-20
Payer: COMMERCIAL

## 2024-07-20 DIAGNOSIS — E11.65 TYPE 2 DIABETES MELLITUS WITH HYPERGLYCEMIA, WITHOUT LONG-TERM CURRENT USE OF INSULIN (HCC): ICD-10-CM

## 2024-07-20 DIAGNOSIS — E55.9 VITAMIN D DEFICIENCY: ICD-10-CM

## 2024-07-20 DIAGNOSIS — E78.2 MIXED HYPERLIPIDEMIA: ICD-10-CM

## 2024-07-20 LAB
25(OH)D3 SERPL-MCNC: 13.3 NG/ML (ref 30–100)
ALBUMIN SERPL BCG-MCNC: 3.8 G/DL (ref 3.5–5)
ALP SERPL-CCNC: 56 U/L (ref 34–104)
ALT SERPL W P-5'-P-CCNC: 15 U/L (ref 7–52)
ANION GAP SERPL CALCULATED.3IONS-SCNC: 7 MMOL/L (ref 4–13)
AST SERPL W P-5'-P-CCNC: 13 U/L (ref 13–39)
BASOPHILS # BLD AUTO: 0.07 THOUSANDS/ÂΜL (ref 0–0.1)
BASOPHILS NFR BLD AUTO: 1 % (ref 0–1)
BILIRUB SERPL-MCNC: 0.81 MG/DL (ref 0.2–1)
BUN SERPL-MCNC: 9 MG/DL (ref 5–25)
CALCIUM SERPL-MCNC: 8.8 MG/DL (ref 8.4–10.2)
CHLORIDE SERPL-SCNC: 106 MMOL/L (ref 96–108)
CHOLEST SERPL-MCNC: 104 MG/DL
CO2 SERPL-SCNC: 22 MMOL/L (ref 21–32)
CREAT SERPL-MCNC: 0.67 MG/DL (ref 0.6–1.3)
CREAT UR-MCNC: 38.2 MG/DL
EOSINOPHIL # BLD AUTO: 0.19 THOUSAND/ÂΜL (ref 0–0.61)
EOSINOPHIL NFR BLD AUTO: 2 % (ref 0–6)
ERYTHROCYTE [DISTWIDTH] IN BLOOD BY AUTOMATED COUNT: 14.2 % (ref 11.6–15.1)
EST. AVERAGE GLUCOSE BLD GHB EST-MCNC: 163 MG/DL
GFR SERPL CREATININE-BSD FRML MDRD: 103 ML/MIN/1.73SQ M
GLUCOSE P FAST SERPL-MCNC: 126 MG/DL (ref 65–99)
HBA1C MFR BLD: 7.3 %
HCT VFR BLD AUTO: 40.5 % (ref 34.8–46.1)
HDLC SERPL-MCNC: 33 MG/DL
HGB BLD-MCNC: 13.2 G/DL (ref 11.5–15.4)
IMM GRANULOCYTES # BLD AUTO: 0.03 THOUSAND/UL (ref 0–0.2)
IMM GRANULOCYTES NFR BLD AUTO: 0 % (ref 0–2)
LDLC SERPL CALC-MCNC: 49 MG/DL (ref 0–100)
LYMPHOCYTES # BLD AUTO: 4.06 THOUSANDS/ÂΜL (ref 0.6–4.47)
LYMPHOCYTES NFR BLD AUTO: 42 % (ref 14–44)
MCH RBC QN AUTO: 28.6 PG (ref 26.8–34.3)
MCHC RBC AUTO-ENTMCNC: 32.6 G/DL (ref 31.4–37.4)
MCV RBC AUTO: 88 FL (ref 82–98)
MICROALBUMIN UR-MCNC: <7 MG/L
MONOCYTES # BLD AUTO: 0.56 THOUSAND/ÂΜL (ref 0.17–1.22)
MONOCYTES NFR BLD AUTO: 6 % (ref 4–12)
NEUTROPHILS # BLD AUTO: 4.67 THOUSANDS/ÂΜL (ref 1.85–7.62)
NEUTS SEG NFR BLD AUTO: 49 % (ref 43–75)
NRBC BLD AUTO-RTO: 0 /100 WBCS
PLATELET # BLD AUTO: 264 THOUSANDS/UL (ref 149–390)
PMV BLD AUTO: 11.7 FL (ref 8.9–12.7)
POTASSIUM SERPL-SCNC: 4.2 MMOL/L (ref 3.5–5.3)
PROT SERPL-MCNC: 6.6 G/DL (ref 6.4–8.4)
RBC # BLD AUTO: 4.61 MILLION/UL (ref 3.81–5.12)
SODIUM SERPL-SCNC: 135 MMOL/L (ref 135–147)
TRIGL SERPL-MCNC: 108 MG/DL
WBC # BLD AUTO: 9.58 THOUSAND/UL (ref 4.31–10.16)

## 2024-07-20 PROCEDURE — 82043 UR ALBUMIN QUANTITATIVE: CPT

## 2024-07-20 PROCEDURE — 80061 LIPID PANEL: CPT

## 2024-07-20 PROCEDURE — 80053 COMPREHEN METABOLIC PANEL: CPT

## 2024-07-20 PROCEDURE — 85025 COMPLETE CBC W/AUTO DIFF WBC: CPT

## 2024-07-20 PROCEDURE — 36415 COLL VENOUS BLD VENIPUNCTURE: CPT

## 2024-07-20 PROCEDURE — 82306 VITAMIN D 25 HYDROXY: CPT

## 2024-07-20 PROCEDURE — 82570 ASSAY OF URINE CREATININE: CPT

## 2024-07-20 PROCEDURE — 83036 HEMOGLOBIN GLYCOSYLATED A1C: CPT

## 2024-07-22 ENCOUNTER — OFFICE VISIT (OUTPATIENT)
Dept: PHYSICAL THERAPY | Facility: CLINIC | Age: 49
End: 2024-07-22
Payer: COMMERCIAL

## 2024-07-22 DIAGNOSIS — E11.65 TYPE 2 DIABETES MELLITUS WITH HYPERGLYCEMIA, WITHOUT LONG-TERM CURRENT USE OF INSULIN (HCC): ICD-10-CM

## 2024-07-22 DIAGNOSIS — H81.13 BENIGN PAROXYSMAL POSITIONAL VERTIGO DUE TO BILATERAL VESTIBULAR DISORDER: Primary | ICD-10-CM

## 2024-07-22 PROCEDURE — 97112 NEUROMUSCULAR REEDUCATION: CPT | Performed by: PHYSICAL THERAPIST

## 2024-07-22 NOTE — PROGRESS NOTES
Daily Note     Today's date: 2024  Patient name: Keeley Moore  : 1975  MRN: [id number]  Referring provider: Blessing Richard DO  Dx:   Encounter Diagnosis     ICD-10-CM    1. Benign paroxysmal positional vertigo due to bilateral vestibular disorder  H81.13                      Subjective: Pt reports a resolution of vertigo with getting in and out of bed, 1 episode lasting 10 minutes yesterday while attending a baseball game. She reports constant dizziness that she can abolish with HEP.    Objective: See treatment diary below    Rep RET pt OP seated abolishes, better    Updated HEP including ni-daroff maneuver    Assessment: Pt demonstrated confirmation of upper cervical retraction DP and a provisional resolution of BPPV.    Plan: Assess response to habituation.       Precautions: PMHx: DM II, obesity, R knee scope, HTN, migraines   Dx: R post canal BPPV    Manuals 7/15 7/18 7/22          L epley maneuver x2 x2 x1                                                 Neuro Re-Ed             Self R epley maneuver HEP            Postural correction and awareness training  10 min 5 min          Rep RET seated  3x10 1x20          Rep RET pt OP seated   1x15          Ni-daroff exercise   3 pillows  1 cycle    2 pillows  1 cycle    1 pillow  2 cycles                                                 Ther Ex                                                                                                                     Ther Activity             STS                          Gait Training                                       Modalities                                             68.2

## 2024-07-23 RX ORDER — TIRZEPATIDE 15 MG/.5ML
INJECTION, SOLUTION SUBCUTANEOUS
Qty: 6 ML | Refills: 1 | Status: SHIPPED | OUTPATIENT
Start: 2024-07-23

## 2024-07-24 ENCOUNTER — OFFICE VISIT (OUTPATIENT)
Dept: PHYSICAL THERAPY | Facility: CLINIC | Age: 49
End: 2024-07-24
Payer: COMMERCIAL

## 2024-07-24 DIAGNOSIS — H81.13 BENIGN PAROXYSMAL POSITIONAL VERTIGO DUE TO BILATERAL VESTIBULAR DISORDER: Primary | ICD-10-CM

## 2024-07-24 PROCEDURE — 97112 NEUROMUSCULAR REEDUCATION: CPT | Performed by: PHYSICAL THERAPIST

## 2024-07-24 NOTE — PROGRESS NOTES
Daily Note     Today's date: 2024  Patient name: Keeley Moore  : 1975  MRN: [id number]  Referring provider: Blessing Richard DO  Dx:   Encounter Diagnosis     ICD-10-CM    1. Benign paroxysmal positional vertigo due to bilateral vestibular disorder  H81.13                      Subjective: Pt reports no dizziness or vertigo since last visit.    Objective: See treatment diary below    Updated HEP, educated pt on self-management vs return to office    Assessment: Pt demonstrated a resolution of BPPV and normalized function. Pt is appropriate for d/c from skilled PT services at this time.    Plan: D/C to HEP.       Precautions: PMHx: DM II, obesity, R knee scope, HTN, migraines   Dx: R post canal BPPV    Manuals 7/15 7/18 7/22 7/24         L epley maneuver x2 x2 x1 np                                                Neuro Re-Ed             Self R epley maneuver HEP            Postural correction and awareness training  10 min 5 min 5 min         Rep RET seated  3x10 1x20 1x10         Rep RET pt OP seated   1x15 2x10         Ni-prasanth exercise   3 pillows  1 cycle    2 pillows  1 cycle    1 pillow  2 cycles 1 pillow  5 cycles                                                Ther Ex                                                                                                                     Ther Activity             STS                          Gait Training                                       Modalities

## 2024-07-25 ENCOUNTER — TELEPHONE (OUTPATIENT)
Age: 49
End: 2024-07-25

## 2024-07-25 DIAGNOSIS — E55.9 VITAMIN D DEFICIENCY: Primary | ICD-10-CM

## 2024-07-25 RX ORDER — ERGOCALCIFEROL 1.25 MG/1
50000 CAPSULE ORAL WEEKLY
Qty: 12 CAPSULE | Refills: 0 | Status: SHIPPED | OUTPATIENT
Start: 2024-07-25

## 2024-07-25 NOTE — TELEPHONE ENCOUNTER
PATIENT CALLED RX REFILL LINE RETURNING CALL FOR;    Deepa Schrader LPN  7/23/2024  1:47 PM EDT Back to Top      Called patient and lvm reviewing providers message and requesting call back regarding Vitamin D RX     PATIENT STATES ITS OK TO SENT RX OF VITAMIN D TO [facility]

## 2024-07-26 ENCOUNTER — TELEPHONE (OUTPATIENT)
Age: 49
End: 2024-07-26

## 2024-07-26 NOTE — TELEPHONE ENCOUNTER
Patient stated she was cleared from PT For the vertigo, she's been good for a few days,they gave pt exercises to do, can she be cleared to drive, 5-6 hours to drive to [place]?  Plan to leave Aug 3, please advise

## 2024-07-28 NOTE — TELEPHONE ENCOUNTER
Looks like as of her 7/24/24 appointment, she no longer had symptoms.   Ok to drive as long as she feels well still

## 2024-07-30 ENCOUNTER — TREATMENT (OUTPATIENT)
Dept: OTHER | Facility: HOSPITAL | Age: 49
End: 2024-07-30
Payer: COMMERCIAL

## 2024-07-30 DIAGNOSIS — E11.65 TYPE 2 DIABETES MELLITUS WITH HYPERGLYCEMIA, WITHOUT LONG-TERM CURRENT USE OF INSULIN (HCC): Primary | ICD-10-CM

## 2024-07-30 PROCEDURE — 95251 CONT GLUC MNTR ANALYSIS I&R: CPT | Performed by: INTERNAL MEDICINE

## 2024-07-31 NOTE — PROGRESS NOTES
CGM data review::  Device: castro Dates: 4/21/24-7/19/24 Usage: 99 % Av glu: 156 mg/dL  SD:  mg/dL CV: 21.2  % GMI:  7 %  TIR: 78 %  TAR: 21+1 %  TBR: 0 %    Glycemic patters:  good control. Continue current treatment.  Hypoglycemia: No    Recommendation:

## 2024-08-13 ENCOUNTER — OFFICE VISIT (OUTPATIENT)
Dept: FAMILY MEDICINE CLINIC | Facility: CLINIC | Age: 49
End: 2024-08-13
Payer: COMMERCIAL

## 2024-08-13 VITALS
OXYGEN SATURATION: 99 % | HEART RATE: 92 BPM | BODY MASS INDEX: 42.28 KG/M2 | TEMPERATURE: 98.3 F | RESPIRATION RATE: 16 BRPM | HEIGHT: 68 IN | SYSTOLIC BLOOD PRESSURE: 116 MMHG | WEIGHT: 279 LBS | DIASTOLIC BLOOD PRESSURE: 70 MMHG

## 2024-08-13 DIAGNOSIS — E11.65 TYPE 2 DIABETES MELLITUS WITH HYPERGLYCEMIA, WITHOUT LONG-TERM CURRENT USE OF INSULIN (HCC): ICD-10-CM

## 2024-08-13 DIAGNOSIS — Z00.00 ANNUAL PHYSICAL EXAM: Primary | ICD-10-CM

## 2024-08-13 DIAGNOSIS — I10 MILD ESSENTIAL HYPERTENSION: ICD-10-CM

## 2024-08-13 DIAGNOSIS — E66.01 CLASS 3 SEVERE OBESITY DUE TO EXCESS CALORIES WITH SERIOUS COMORBIDITY AND BODY MASS INDEX (BMI) OF 40.0 TO 44.9 IN ADULT (HCC): ICD-10-CM

## 2024-08-13 DIAGNOSIS — E78.2 MIXED HYPERLIPIDEMIA: ICD-10-CM

## 2024-08-13 PROCEDURE — 99396 PREV VISIT EST AGE 40-64: CPT | Performed by: FAMILY MEDICINE

## 2024-08-13 PROCEDURE — 99214 OFFICE O/P EST MOD 30 MIN: CPT | Performed by: FAMILY MEDICINE

## 2024-08-13 NOTE — PROGRESS NOTES
Adult Annual Physical  Name: Keeley Moore      : 1975      MRN: [id number]  Encounter Provider: Azucena Hart MD  Encounter Date: 2024   Encounter department: [facility]    Assessment & Plan   1. Annual physical exam  Comments:  reviewed labs  utd crc and mammo  vax utd  encouraged to schedule gyn visit  schedule ophtho visit  2. Class 3 severe obesity due to excess calories with serious comorbidity and body mass index (BMI) of 40.0 to 44.9 in adult ([facility])  Assessment & Plan:  Continues on glp-1  She is working on diet  3. Mixed hyperlipidemia  Assessment & Plan:  On atorvastatin  LDL well controlled  4. Mild essential hypertension  Assessment & Plan:  BP is stable, no changes    5. Type 2 diabetes mellitus with hyperglycemia, without long-term current use of insulin ([facility])  Assessment & Plan:  A1C improving, continue f/u w/ endocrine as planned  Reviewed labs  Lab Results   Component Value Date    HGBA1C 7.3 (H) 2024     Immunizations and preventive care screenings were discussed with patient today. Appropriate education was printed on patient's after visit summary.        History of Present Illness     Adult Annual Physical:  Patient presents for annual physical. Here for annual PE with her .     Watching carbs in diet  Following with endocrine   Has a CGM and her estimated A1C is <7  She finds she is eating more healthfully  Is taking mounjaro  No n/v/heartburn  Normal Bms.     Overdue for gyn visit, she will call for this.  .     Diet and Physical Activity:  - Diet/Nutrition: diabetic diet.  - Exercise: walking. Signed up for 2.9 mile walk in November so is trying to walk more to prepare.    Depression Screening:  - PHQ-2 Score: 0    General Health:  - Sleep:. some nights good, some less good.  - Hearing: normal hearing bilateral ears.  - Vision: wears glasses. due for an appt  - Dental: regular dental visits.    /GYN Health:  - Follows with GYN: yes.  "    Review of Systems      Objective     /70   Pulse 92   Temp 98.3 °F (36.8 °C)   Resp 16   Ht 5' 8\" (1.727 m)   Wt 127 kg (279 lb)   SpO2 99%   BMI 42.42 kg/m²   Wt Readings from Last 3 Encounters:   08/13/24 127 kg (279 lb)   07/05/24 128 kg (281 lb 9.6 oz)   03/12/24 130 kg (287 lb)       Physical Exam  Vitals and nursing note reviewed.   Constitutional:       General: She is not in acute distress.     Appearance: Normal appearance. She is well-developed. She is not ill-appearing.   HENT:      Head: Normocephalic and atraumatic.   Eyes:      Conjunctiva/sclera: Conjunctivae normal.   Neck:      Vascular: No carotid bruit.   Cardiovascular:      Rate and Rhythm: Normal rate and regular rhythm.      Heart sounds: No murmur heard.  Pulmonary:      Effort: Pulmonary effort is normal. No respiratory distress.      Breath sounds: Normal breath sounds.   Abdominal:      Palpations: Abdomen is soft.      Tenderness: There is no abdominal tenderness.   Musculoskeletal:      Cervical back: Neck supple.      Right lower leg: No edema.      Left lower leg: No edema.   Lymphadenopathy:      Cervical: No cervical adenopathy.   Skin:     General: Skin is warm and dry.   Neurological:      Mental Status: She is alert and oriented to person, place, and time.   Psychiatric:         Mood and Affect: Mood normal.         Behavior: Behavior normal.         "

## 2024-08-13 NOTE — PROGRESS NOTES
Diabetic Foot Exam    Patient's shoes and socks removed.    Right Foot/Ankle   Right Foot Inspection  Skin Exam: skin normal, skin intact, dry skin, callus and callus. No warmth, no erythema, no maceration, no abnormal color, no pre-ulcer and no ulcer.     Toe Exam: ROM and strength within normal limits.     Sensory   Monofilament testing: intact    Vascular  Capillary refills: < 3 seconds  The right DP pulse is 1+.     Left Foot/Ankle  Left Foot Inspection  Skin Exam: skin normal, skin intact, dry skin and callus. No warmth, no erythema, no maceration, normal color, no pre-ulcer and no ulcer.     Toe Exam: ROM and strength within normal limits.     Sensory   Monofilament testing: intact    Vascular  Capillary refills: < 3 seconds  The left DP pulse is 1+.     Assign Risk Category  No deformity present  No loss of protective sensation  Weak pulses  Risk: 1

## 2024-08-13 NOTE — ASSESSMENT & PLAN NOTE
A1C improving, continue f/u w/ endocrine as planned  Reviewed labs  Lab Results   Component Value Date    HGBA1C 7.3 (H) 07/20/2024

## 2024-08-16 DIAGNOSIS — E11.65 TYPE 2 DIABETES MELLITUS WITH HYPERGLYCEMIA, WITHOUT LONG-TERM CURRENT USE OF INSULIN (HCC): ICD-10-CM

## 2024-09-03 ENCOUNTER — OFFICE VISIT (OUTPATIENT)
Dept: ENDOCRINOLOGY | Facility: CLINIC | Age: 49
End: 2024-09-03
Payer: COMMERCIAL

## 2024-09-03 VITALS
HEIGHT: 68 IN | HEART RATE: 89 BPM | DIASTOLIC BLOOD PRESSURE: 68 MMHG | BODY MASS INDEX: 41.83 KG/M2 | TEMPERATURE: 97.5 F | SYSTOLIC BLOOD PRESSURE: 118 MMHG | WEIGHT: 276 LBS | OXYGEN SATURATION: 98 %

## 2024-09-03 DIAGNOSIS — E66.01 CLASS 3 SEVERE OBESITY DUE TO EXCESS CALORIES WITH SERIOUS COMORBIDITY AND BODY MASS INDEX (BMI) OF 40.0 TO 44.9 IN ADULT (HCC): ICD-10-CM

## 2024-09-03 DIAGNOSIS — E78.2 MIXED HYPERLIPIDEMIA: ICD-10-CM

## 2024-09-03 DIAGNOSIS — E11.65 TYPE 2 DIABETES MELLITUS WITH HYPERGLYCEMIA, WITHOUT LONG-TERM CURRENT USE OF INSULIN (HCC): Primary | ICD-10-CM

## 2024-09-03 PROCEDURE — 95251 CONT GLUC MNTR ANALYSIS I&R: CPT | Performed by: INTERNAL MEDICINE

## 2024-09-03 PROCEDURE — 99214 OFFICE O/P EST MOD 30 MIN: CPT | Performed by: INTERNAL MEDICINE

## 2024-09-03 PROCEDURE — 3074F SYST BP LT 130 MM HG: CPT | Performed by: INTERNAL MEDICINE

## 2024-09-03 PROCEDURE — 3078F DIAST BP <80 MM HG: CPT | Performed by: INTERNAL MEDICINE

## 2024-09-03 RX ORDER — INSULIN GLARGINE 300 U/ML
15 INJECTION, SOLUTION SUBCUTANEOUS
Qty: 9 ML | Refills: 0 | Status: SHIPPED | OUTPATIENT
Start: 2024-09-03

## 2024-09-03 RX ORDER — PEN NEEDLE, DIABETIC 32GX 5/32"
NEEDLE, DISPOSABLE MISCELLANEOUS
Qty: 100 EACH | Refills: 2 | Status: SHIPPED | OUTPATIENT
Start: 2024-09-03 | End: 2024-09-06

## 2024-09-03 NOTE — PROGRESS NOTES
"    Follow-up Patient Progress Note      CC: Type 2 diabetes, morbid obesity     History of Present Illness:   48-year-old female with type 2 diabetes, HTN, HLD, vitamin D deficiency, morbid obesity BMI 43, DJD, Hirsutism, chronic migraines, suspected ALVARADO and Rt ankle bone spur.  Last visit was 1/24/24.     Since last visit, she lost 9 lbs.     Menstrual history : LMP was 4/21/23.  Appears irregular.  Never had pregnancy.     CGM data review::  Device: Shelly   Dates: 9/21/24 - 9/3/24            Usage: 96 %   Av glu: 146 mg/dL                   SD:  mg/dL            CV: 19.6  %        GMI: 6.8%  TIR: 89 %        TAR: 11 %   TBR: 0  %     Glycemic patters: excellent control. Rare pp hyperglycemia.  Hypoglycemia: No     Current meds: Reported side effects with Jardiance.  Mounjaro 15mg weekly - started 3/12/24  Metformin 1000 mg p.o. twice daily  Toujeo 25 units nightly     Opthamology: Yes  Podiatry: 10/22  vaccination:   Dental:  Pancreatitis: no     Ace/ARB: Lisinopril 10 mg daily  Statin: Lipitor 10 mg nightly  Thyroid issues: No      Physical Exam:  Body mass index is 41.97 kg/m².  /68 (BP Location: Left arm, Patient Position: Sitting, Cuff Size: Standard)   Pulse 89   Temp 97.5 °F (36.4 °C) (Temporal)   Ht 5' 8\" (1.727 m)   Wt 125 kg (276 lb)   SpO2 98%   BMI 41.97 kg/m²    Vitals:    09/03/24 1341   Weight: 125 kg (276 lb)        Physical Exam  Constitutional:       General: She is not in acute distress.     Appearance: She is well-developed.   HENT:      Head: Normocephalic and atraumatic.      Nose: Nose normal.   Eyes:      Conjunctiva/sclera: Conjunctivae normal.   Pulmonary:      Effort: Pulmonary effort is normal.   Abdominal:      General: There is no distension.   Musculoskeletal:      Cervical back: Normal range of motion and neck supple.   Skin:     Findings: No rash.      Comments: No icterus   Neurological:      Mental Status: She is alert and oriented to person, place, and time. "         Labs:   Lab Results   Component Value Date    HGBA1C 7.3 (H) 07/20/2024       Lab Results   Component Value Date    XJP5DNKJDXNT 2.453 06/03/2023    TSH 2.14 01/18/2021       Lab Results   Component Value Date    CREATININE 0.67 07/20/2024    CREATININE 0.56 (L) 02/01/2024    CREATININE 0.70 09/21/2023    BUN 9 07/20/2024    K 4.2 07/20/2024     07/20/2024    CO2 22 07/20/2024     GFR, Calculated   Date Value Ref Range Status   05/23/2019 106 >60 mL/min/1.73m2 Final     Comment:     mL/min per 1.73 square meters                                            Normal Function or Mild Renal    Disease (if clinically at risk):  >or=60  Moderately Decreased:                30-59  Severely Decreased:                  15-29  Renal Failure:                         <15                                            -American GFR: multiply reported GFR by 1.16    Please note that the eGFR is based on the CKD-EPI calculation, and is not intended to be used for drug dosing.                                            Note: Calculated GFR may not be an accurate indicator of renal function if the patient's renal function is not in a steady state.     eGFRcr   Date Value Ref Range Status   03/17/2023 102 >59 Final     eGFR   Date Value Ref Range Status   07/20/2024 103 ml/min/1.73sq m Final       Lab Results   Component Value Date    ALT 15 07/20/2024    AST 13 07/20/2024    ALKPHOS 56 07/20/2024       Lab Results   Component Value Date    CHOLESTEROL 104 07/20/2024    CHOLESTEROL 107 02/01/2024    CHOLESTEROL 118 10/26/2022     Lab Results   Component Value Date    HDL 33 (L) 07/20/2024    HDL 37 (L) 02/01/2024    HDL 42 (L) 10/26/2022     Lab Results   Component Value Date    TRIG 108 07/20/2024    TRIG 103 02/01/2024    TRIG 96 10/26/2022     Lab Results   Component Value Date    NONHDLC 76 10/26/2022         Assessment/Plan:    1. Type 2 diabetes mellitus with hyperglycemia, without long-term current use of insulin  ([facility])  Assessment & Plan:  She is improved with GMI 6.8% and TIR 89%. She lost 9 lbs only.    Today we discussed options and agreed to continue metformin 1000mg po bid, decrease Toujeo 15u qhs and continue Mounjaro.  Reiterated diet and lifestyle changes.    Follow up in 6 months.    Lab Results   Component Value Date    HGBA1C 7.3 (H) 07/20/2024     Orders:  -     Insulin Pen Needle (BD Pen Needle Sindhu 2nd Gen) 32G X 4 MM MISC; Use daily bedtime  -     tirzepatide (Mounjaro) 15 MG/0.5ML; INJECT 0.5 ML (15 MG TOTAL) UNDER THE SKIN EVERY 7 DAYSStrength: 15 MG/0.5ML  -     metFORMIN (GLUCOPHAGE) 1000 MG tablet; Take 1 tablet (1,000 mg total) by mouth 2 (two) times a day with meals  -     insulin glargine (Toujeo SoloStar) 300 units/mL CONCENTRATED U-300 injection pen (1-unit dial); Inject 15 Units under the skin daily at bedtime  -     Hemoglobin A1C; Future  2. Class 3 severe obesity due to excess calories with serious comorbidity and body mass index (BMI) of 40.0 to 44.9 in adult ([facility])  Assessment & Plan:  She lost a total of 9 lbs only in spite of dose escalation of mounjaro to maximum.  Encouraged medical fitness training.    We may consider adding Qysimia or contrave if covered in future.  Goal is weight of 220 lbs over next 6-8 months.  3. Mixed hyperlipidemia  Assessment & Plan:  Continue lipitor 10mg qhs.        I have spent a total time of 30 minutes on 09/03/24 in caring for this patient including greater than 50% of this time was spent in counseling/coordination of care as listed above.       Discussed with the patient and all questioned fully answered. She will contact me with concerns.    Perry Trinh

## 2024-09-03 NOTE — ASSESSMENT & PLAN NOTE
She is improved with GMI 6.8% and TIR 89%. She lost 9 lbs only.    Today we discussed options and agreed to continue metformin 1000mg po bid, decrease Toujeo 15u qhs and continue Mounjaro.  Reiterated diet and lifestyle changes.    Follow up in 6 months.    Lab Results   Component Value Date    HGBA1C 7.3 (H) 07/20/2024

## 2024-09-03 NOTE — ASSESSMENT & PLAN NOTE
She lost a total of 9 lbs only in spite of dose escalation of mounjaro to maximum.  Encouraged medical fitness training.    We may consider adding Qysimia or contrave if covered in future.  Goal is weight of 220 lbs over next 6-8 months.

## 2024-09-06 DIAGNOSIS — E11.65 TYPE 2 DIABETES MELLITUS WITH HYPERGLYCEMIA, WITHOUT LONG-TERM CURRENT USE OF INSULIN (HCC): ICD-10-CM

## 2024-09-06 RX ORDER — PEN NEEDLE, DIABETIC 32GX 5/32"
NEEDLE, DISPOSABLE MISCELLANEOUS
Qty: 100 EACH | Refills: 6 | Status: SHIPPED | OUTPATIENT
Start: 2024-09-06

## 2024-09-21 DIAGNOSIS — E11.65 TYPE 2 DIABETES MELLITUS WITH HYPERGLYCEMIA, WITHOUT LONG-TERM CURRENT USE OF INSULIN (HCC): ICD-10-CM

## 2024-09-23 ENCOUNTER — TELEPHONE (OUTPATIENT)
Age: 49
End: 2024-09-23

## 2024-09-23 ENCOUNTER — TELEPHONE (OUTPATIENT)
Dept: OTHER | Facility: HOSPITAL | Age: 49
End: 2024-09-23

## 2024-09-23 NOTE — TELEPHONE ENCOUNTER
Patient called for a 90 day supply of mounjaro 15mg to be sent to [facility]  Which was sent on 9/3/24 with 6ml & 1 refill patient to call pharmacy to verify and will call us back if needed

## 2024-09-23 NOTE — TELEPHONE ENCOUNTER
Reason for call:   [x] Prior Auth  [] Other:     Caller:  [x] Patient  [] Pharmacy  Name:   Address:   Callback Number:     Medication: tirzepatide (Mounjaro) 15 MG/0.5ML     Dose/Frequency: INJECT 0.5 ML (15 MG TOTAL) UNDER THE SKIN EVERY 7 DAYSStrength: 15 MG/0.5ML     Quantity: 6 mL    Ordering Provider:   [] PCP/Provider -   [x] Speciality/Provider - Jay/Perry Trinh

## 2024-09-26 NOTE — TELEPHONE ENCOUNTER
PA for Mounjaro 15 MG/0.5ML SUBMITTED     via    [x]CMM-KEY: [id number]  []SurescLorena Gaxiola-Case ID #   []Faxed to plan   []Other website   []Phone call Case ID #     Office notes sent, clinical questions answered. Awaiting determination    Turnaround time for your insurance to make a decision on your Prior Authorization can take 7-21 business days.

## 2024-09-27 NOTE — TELEPHONE ENCOUNTER
PA for Mounjaro 15MG/0.5ML auto-injectors NOT REQUIRED     Reason (screenshot if applicable)          Patient advised by          [x] MyChart Message  [] Phone call   []LMOM  []L/M to call office as no active Communication consent on file  []Unable to leave detailed message as VM not approved on Communication consent       Pharmacy advised by    [x]Fax  []Phone call

## 2024-10-03 DIAGNOSIS — E55.9 VITAMIN D DEFICIENCY: ICD-10-CM

## 2024-10-03 RX ORDER — ERGOCALCIFEROL 1.25 MG/1
CAPSULE, LIQUID FILLED ORAL
Qty: 12 CAPSULE | Refills: 0 | Status: SHIPPED | OUTPATIENT
Start: 2024-10-03

## 2024-10-10 ENCOUNTER — APPOINTMENT (OUTPATIENT)
Dept: RADIOLOGY | Age: 49
End: 2024-10-10
Payer: COMMERCIAL

## 2024-10-10 ENCOUNTER — OFFICE VISIT (OUTPATIENT)
Dept: OBGYN CLINIC | Facility: CLINIC | Age: 49
End: 2024-10-10
Payer: COMMERCIAL

## 2024-10-10 VITALS
HEIGHT: 68 IN | DIASTOLIC BLOOD PRESSURE: 83 MMHG | HEART RATE: 77 BPM | BODY MASS INDEX: 41.83 KG/M2 | WEIGHT: 276 LBS | SYSTOLIC BLOOD PRESSURE: 135 MMHG

## 2024-10-10 DIAGNOSIS — M25.512 ACUTE PAIN OF LEFT SHOULDER: Primary | ICD-10-CM

## 2024-10-10 DIAGNOSIS — M75.82 TENDINITIS OF LEFT ROTATOR CUFF: ICD-10-CM

## 2024-10-10 DIAGNOSIS — M75.42 IMPINGEMENT SYNDROME OF LEFT SHOULDER: ICD-10-CM

## 2024-10-10 DIAGNOSIS — M25.512 ACUTE PAIN OF LEFT SHOULDER: ICD-10-CM

## 2024-10-10 PROCEDURE — 20610 DRAIN/INJ JOINT/BURSA W/O US: CPT | Performed by: PHYSICIAN ASSISTANT

## 2024-10-10 PROCEDURE — 73030 X-RAY EXAM OF SHOULDER: CPT

## 2024-10-10 PROCEDURE — 99203 OFFICE O/P NEW LOW 30 MIN: CPT | Performed by: PHYSICIAN ASSISTANT

## 2024-10-10 RX ORDER — LIDOCAINE HYDROCHLORIDE 10 MG/ML
2 INJECTION, SOLUTION INFILTRATION; PERINEURAL
Status: COMPLETED | OUTPATIENT
Start: 2024-10-10 | End: 2024-10-10

## 2024-10-10 RX ORDER — TRIAMCINOLONE ACETONIDE 40 MG/ML
80 INJECTION, SUSPENSION INTRA-ARTICULAR; INTRAMUSCULAR
Status: COMPLETED | OUTPATIENT
Start: 2024-10-10 | End: 2024-10-10

## 2024-10-10 RX ADMIN — LIDOCAINE HYDROCHLORIDE 2 ML: 10 INJECTION, SOLUTION INFILTRATION; PERINEURAL at 18:00

## 2024-10-10 RX ADMIN — TRIAMCINOLONE ACETONIDE 80 MG: 40 INJECTION, SUSPENSION INTRA-ARTICULAR; INTRAMUSCULAR at 18:00

## 2024-10-10 NOTE — PATIENT INSTRUCTIONS
Patient Education     Rotator Cuff Tendinitis Strengthening Exercises   About this topic   The rotator cuff is a group of muscles and tendons in your shoulder. It helps your shoulder move and be steady. These muscles help to rotate and lift the arm. Tendons are strong bands that connect muscles to bones. Tendonitis happens when the tendon becomes swollen and inflamed. Exercises can help make this problem better. If you have this problem, it may be helpful to see a physical therapist. A therapist can tell you what exercises are best for you.  General   Before starting with a program, ask your doctor if you are healthy enough to do these exercises. Your doctor may have you work with a  or physical therapist to make a safe exercise program to meet your needs.  Strengthening Exercises   Strengthening exercises keep your muscles firm and strong. Start by repeating each exercise 2 to 3 times. Work up to doing each exercise 10 times. Try to do the exercises 2 to 3 times each day. Hold each exercise for 3 to 5 seconds. Do all exercises slowly.  Shoulder blade exercises ? Lie on your stomach near the edge of a mat, bed, or supported on an exercise ball. Start with your arms hanging down in a relaxed position.  Y position: Raise your arms up and to the sides so your elbows are near your ears and your arms are straight out diagonally like the letter Y. Lower the arms back to the starting position.  T position: Raise your arms straight out to the sides, even with your shoulders. Lower the arms back to the starting position.  I position: Raise your arms straight back until they are in line with your body like the letter I. Lower your arms slowly back to the starting position.  Wall push-ups ? Stand about 18 inches (45 cm) away from a wall. Place your hands on the wall in front of both shoulders. Keeping your back straight, lean forward by bending your elbows until your face almost touches the wall. Now, straighten the  "elbows and push your body away from the wall. As this gets easier, try doing a push-up leaning on a counter to put more body weight through your arms.  Shoulder exercises ? Tie a knot in an exercise band. Secure the band in a door by shutting it in around waist level. Wrap the band around one hand for a good . Stand away from the door enough to have slight tension in the band. As the exercises get easier, you may need to change to a heavier band. Moving closer to, or further away from, the point where the band is tied down will decrease or increase the tension in the band.  Internal rotations ? Face sideways with the arm holding the band closest to the door. Bend the elbow to 90 degrees or in an \"L\" position. Keeping your elbow by your side and bent, pull the band across your body. Bring it back to the starting position. Repeat with the other arm.  External rotations ? Face sideways with the arm holding the band farthest from the door. Bend the elbow to 90 degrees or in an \"L\" position. Keeping your elbow by your side and bent, pull the band away from your body. Bring it back to the starting position. Repeat with the other arm.  Abductions ? Face sideways with the arm holding the band farthest from the door. Be sure that your thumb is facing upwards. Keep your elbow straight and pull the band out to the side and away from your body. Go up to shoulder level. Bring it back to the starting position. Repeat with the other arm.  Flexions ? Face away from the door. Keeping your elbow straight, pull the band straight out in front of you. Bring it back to the starting position. Repeat with the other arm.         What will the results be?   Less pain and stiffness  Better range of motion  Better function  Less swelling  Increased strength  Easier to do daily activities  Helpful tips   Avoid activities that repeatedly use your shoulder in the same way which may cause your shoulder pain to worsen.  Stay active and work out " to keep your muscles strong and flexible.  Eat a healthy diet to keep your muscles healthy.  Be sure you do not hold your breath when exercising. This can raise your blood pressure. If you tend to hold your breath, try counting out loud when exercising. If any exercise bothers you, stop right away.  Try walking and swinging your arms at an easy pace for a few minutes to warm up your muscles. Do this again after exercising.  After exercising, it is a good idea to use ice. Place an ice pack or a bag of frozen peas wrapped in a towel over the painful part. Never put ice right on the skin. Do not leave the ice on more than 10 to 15 minutes at a time. Ice after activity may help decrease pain and swelling. Never ice before stretching.  Doing exercises before a meal may be a good way to get into a routine.  Exercise may be slightly uncomfortable, but you should not have sharp pains. If you do get sharp pains, stop what you are doing. If the sharp pains continue, call your doctor.  Last Reviewed Date   2021-05-21  Consumer Information Use and Disclaimer   This generalized information is a limited summary of diagnosis, treatment, and/or medication information. It is not meant to be comprehensive and should be used as a tool to help the user understand and/or assess potential diagnostic and treatment options. It does NOT include all information about conditions, treatments, medications, side effects, or risks that may apply to a specific patient. It is not intended to be medical advice or a substitute for the medical advice, diagnosis, or treatment of a health care provider based on the health care provider's examination and assessment of a patient’s specific and unique circumstances. Patients must speak with a health care provider for complete information about their health, medical questions, and treatment options, including any risks or benefits regarding use of medications. This information does not endorse any  treatments or medications as safe, effective, or approved for treating a specific patient. Up[employer], Inc[employer] and its affiliates disclaim any warranty or liability relating to this information or the use thereof. The use of this information is governed by the Terms of Use, available at https://www.[employer].com/en/know/clinical-effectiveness-terms   Copyright   Copyright © 2024 [employer], [employer] and its affiliates and/or licensors. All rights reserved.

## 2024-10-10 NOTE — PROGRESS NOTES
Orthopaedic Surgery - Office Note  Keeley Moore (49 y.o. female)   : 1975   MRN: [id number]  Encounter Date: 10/10/2024    Chief Complaint   Patient presents with    Left Shoulder - Pain         Assessment/Plan  Diagnoses and all orders for this visit:    Acute pain of left shoulder  -     XR shoulder 2+ vw left; Future    Tendinitis of left rotator cuff    Impingement syndrome of left shoulder    Other orders  -     Large joint arthrocentesis    The diagnosis as well as treatment options were reviewed with the patient in the office today.  I would recommend icing the shoulder 20 minutes on 1 hour off 3 times a day.  She will start formal physical therapy.  She will start oral Aleve 1 tablet twice daily with food stopping calling if any stomach upset occurs.  The risks and benefits of a subacromial cortisone injection were reviewed at length.  Shared decision making was utilized.  We reviewed the risks of a elevated blood glucose with her diabetic history.  Appropriate precautions and plan were reviewed and provided.  Patient did note significant decrease in pain postinjection as well as improved range of motion.    She will return in 6 weeks for repeat evaluation at which time if she has not improved we would consider advanced imaging.     Return for Recheck in 6 weeks with myself.        History of Present Illness  This is a new patient with left shoulder pain.  She has had pain for about a month without any known injury.  The pain is located in the lateral shoulder and is worse with overhead motion and reaching behind her back.  She is right-hand dominant.  She denies any paresthesias.  She has tried over-the-counter Tylenol and anti-inflammatories without much relief.  She has not had problems like this in the past.  She reports she does monitor her blood glucose closely including an indwelling monitor.  Her current readings show an A1c averages 6.9.  She does have ability to contact her treating  "physician if it elevates for medication adjustments.    Patient has a contributing medical history of diabetes with most recent hemoglobin A1c being 7.3.    Review of Systems  Pertinent items are noted in HPI.  All other systems were reviewed and are negative.    Physical Exam  /83 (BP Location: Right arm, Patient Position: Sitting, Cuff Size: Large)   Pulse 77   Ht 5' 8\" (1.727 m)   Wt 125 kg (276 lb)   BMI 41.97 kg/m²   Cons: Appears well.  No apparent distress.  Psych: Alert. Oriented x3.  Mood and affect normal.    Patient's left shoulder has no skin breakdown lesions or signs of infection.  Patient's left shoulder has a painful active range of motion forward flexion to 120 degrees passively to 170 degrees.  Internal rotation is painful to L5.  External rotation is to 70 degrees.  She has a positive impingement sign.  She has a positive drop arm test for pain but no weakness.  Internal rotation and external rotation strength is 5 out of 5.  She has no AC joint tenderness.  There is no tenderness in the bicipital groove.  She is neurovascularly intact in the left upper extremity.  Elbow exam is unremarkable        Studies Reviewed  Independent review of x-rays performed in the office today 3 views of the left shoulder show no acute fractures or dislocations.  Mild AC joint degenerative changes are seen.  No calcific tendinitis is noted.  X-rays were reviewed from orthopedic standpoint will await official radiologist interpretation    Large joint arthrocentesis: L subacromial bursa  Universal Protocol:  Consent: Verbal consent obtained.  Risks and benefits: risks, benefits and alternatives were discussed  Consent given by: patient  Time out: Immediately prior to procedure a \"time out\" was called to verify the correct patient, procedure, equipment, support staff and site/side marked as required.  Patient understanding: patient states understanding of the procedure being performed  Patient consent: the " patient's understanding of the procedure matches consent given  Relevant documents: relevant documents present and verified  Test results: test results available and properly labeled  Site marked: the operative site was marked  Radiology Images displayed and confirmed. If images not available, report reviewed: imaging studies available  Patient identity confirmed: verbally with patient  Supporting Documentation  Indications: pain   Procedure Details  Location: shoulder - L subacromial bursa  Preparation: Patient was prepped and draped in the usual sterile fashion  Needle size: 22 G  Ultrasound guidance: no  Approach: posterior  Medications administered: 2 mL lidocaine 1 %; 80 mg triamcinolone acetonide 40 mg/mL    Patient tolerance: patient tolerated the procedure well with no immediate complications  Dressing:  Sterile dressing applied            Medical, Surgical, Family, and Social History  The patient's medical history, family history, and social history, were reviewed and updated as appropriate.    Past Medical History:   Diagnosis Date    Arthritis     Benign essential hypertension     Resolved 2016     Diabetes mellitus (HCC)     Diabetes mellitus type 2, controlled (HCC)     Dyspnea and respiratory abnormality     Resolved 2015     Headache(784.0)     Migraine     Varicella     Vertigo        Past Surgical History:   Procedure Laterality Date    HAND SURGERY  2014    KNEE ARTHROPLASTY Right     WISDOM TOOTH EXTRACTION         Family History   Problem Relation Age of Onset    Diabetes unspecified Mother     Diabetes Mother     Lupus Mother     Diabetes type II Mother     Cancer Mother     Heart murmur Father     Other Father         Right leg amputation     Diabetes type II Father     Diabetes unspecified Sister     Diabetes Sister     Cancer Maternal Grandmother         Skin, also maybe breast.   age 80s.    No Known Problems Maternal Grandfather         mild dementia, 80s    Breast cancer  Paternal Grandmother         50-60s,  age 92    Cancer Paternal Grandmother     Diabetes unspecified Paternal Grandfather     Arrhythmia Paternal Grandfather     Heart attack Paternal Grandfather     Heart disease Paternal Grandfather          age 70 after pacemaker placement        Social History     Occupational History    Not on file   Tobacco Use    Smoking status: Never     Passive exposure: Never    Smokeless tobacco: Never   Vaping Use    Vaping status: Never Used   Substance and Sexual Activity    Alcohol use: Not Currently    Drug use: Never    Sexual activity: Yes     Partners: Male     Birth control/protection: None       Allergies   Allergen Reactions    Hydrocodone-Acetaminophen      Vicodin--Other reaction(s): BREATHING WAS FUNNY  Tingling sensation in mouth         Current Outpatient Medications:     atorvastatin (LIPITOR) 10 mg tablet, TAKE 1 TABLET DAILY, Disp: 90 tablet, Rfl: 1    Continuous Blood Gluc  (FreeStyle Shelly [place]) [facility], Use 1 Units continuous, Disp: 1 each, Rfl: 0    Continuous Glucose Sensor (FreeStyle Shelly 3 Sensor) MISC, USE 1 SENSOR EVERY 14 DAYS, Disp: 6 each, Rfl: 3    ergocalciferol (VITAMIN D2) 50,000 units, TAKE 1 CAPSULE BY MOUTH ONE TIME PER WEEK, Disp: 12 capsule, Rfl: 0    insulin glargine (Toujeo SoloStar) 300 units/mL CONCENTRATED U-300 injection pen (1-unit dial), Inject 15 Units under the skin daily at bedtime, Disp: 9 mL, Rfl: 0    Insulin Pen Needle (BD Pen Needle Sindhu 2nd Gen) 32G X 4 MM MISC, USE ONE PEN NEEDLE ONCE DAILY, Disp: 100 each, Rfl: 6    lisinopril (ZESTRIL) 10 mg tablet, TAKE 1 TABLET DAILY FOR BLOOD PRESSURE, Disp: 90 tablet, Rfl: 1    meclizine (ANTIVERT) 12.5 MG tablet, Take by mouth if needed for dizziness, Disp: , Rfl:     metFORMIN (GLUCOPHAGE) 1000 MG tablet, Take 1 tablet (1,000 mg total) by mouth 2 (two) times a day with meals, Disp: 180 tablet, Rfl: 1    naproxen (NAPROSYN) 500 mg tablet, Take 1 tablet (500 mg total) by  mouth daily as needed for moderate pain (not with meloxicam), Disp: , Rfl:     rizatriptan (Maxalt-MLT) 10 mg disintegrating tablet, Take 1 tablet (10 mg total) by mouth as needed for migraine Take at the onset of migraine; if symptoms continue or return, may take another dose at least 2 hours after first dose. Take no more than 2 doses in a day., Disp: 10 tablet, Rfl: 3    tirzepatide (Mounjaro) 15 MG/0.5ML, INJECT 0.5 ML (15 MG TOTAL) UNDER THE SKIN EVERY 7 DAYSStrength: 15 MG/0.5ML, Disp: 6 mL, Rfl: 1      Rony Mcmanus PA-C

## 2024-10-21 ENCOUNTER — EVALUATION (OUTPATIENT)
Dept: PHYSICAL THERAPY | Facility: CLINIC | Age: 49
End: 2024-10-21
Payer: COMMERCIAL

## 2024-10-21 DIAGNOSIS — M75.82 TENDINITIS OF LEFT ROTATOR CUFF: ICD-10-CM

## 2024-10-21 DIAGNOSIS — M75.42 IMPINGEMENT SYNDROME OF LEFT SHOULDER: ICD-10-CM

## 2024-10-21 DIAGNOSIS — M25.512 ACUTE PAIN OF LEFT SHOULDER: ICD-10-CM

## 2024-10-21 PROCEDURE — 97161 PT EVAL LOW COMPLEX 20 MIN: CPT | Performed by: PHYSICAL THERAPIST

## 2024-10-21 NOTE — PROGRESS NOTES
PT Evaluation     Today's date: 10/21/2024  Patient name: Keeley Moore  : 1975  MRN: [id number]  Referring provider: Rony Mcmanus PA*  Dx:   Encounter Diagnosis     ICD-10-CM    1. Acute pain of left shoulder  M25.512 Ambulatory Referral to Physical Therapy      2. Tendinitis of left rotator cuff  M75.82 Ambulatory Referral to Physical Therapy      3. Impingement syndrome of left shoulder  M75.42 Ambulatory Referral to Physical Therapy                     Assessment  Impairments: abnormal or restricted ROM, activity intolerance, impaired physical strength, lacks appropriate home exercise program, pain with function and poor body mechanics    Assessment details: Pt presents with s/s consistent with L subacromial impingement.  Pt will benefit from PT to address impairments and restore function.    Goals  ST-4 weeks.  1.  Pt will decrease pain > 25%.  2.  Pt will increase PROM WNL.    LT-8 weeks.  1.  Pt will decrease pain > 75%.  2.  Pt will dress without pain.    Plan    Planned therapy interventions: activity modification, joint mobilization, manual therapy, neuromuscular re-education, postural training, self care, strengthening, stretching, therapeutic activities, therapeutic exercise, therapeutic training, graded activity, graded exercise, functional ROM exercises, flexibility and home exercise program    Frequency: 2x week  Duration in weeks: 6        Subjective Evaluation    History of Present Illness  Mechanism of injury: Pt is a 49 yof c/o L deep and posterior shoulder pain that began with an insidious onset 1 month ago.  Patient Goals  Patient goals for therapy: decreased edema, decreased pain, increased motion, increased strength, independence with ADLs/IADLs and return to sport/leisure activities    Pain  Current pain ratin  At best pain ratin  At worst pain rating: 10  Quality: dull ache and sharp  Relieving factors: rest and medications  Aggravating factors: overhead  "activity and lifting (reaching into the small of her back and dressing.)  Progression: improved      Diagnostic Tests  X-ray: normal (10/10/24)  Treatments  Previous treatment: injection treatment (5% reduction in pain lasting several days.)        Objective     Active Range of Motion   Left Shoulder   Flexion: 120 degrees with pain  Abduction: 110 degrees with pain  External rotation 0°: 70 degrees with pain  Internal rotation BTB: L5 with pain    Passive Range of Motion   Left Shoulder   Flexion: 120 degrees with pain  Abduction: 100 degrees with pain  External rotation 90°: 10 degrees with pain  Internal rotation 90°: 5 degrees with pain    Strength/Myotome Testing     Left Shoulder     Planes of Motion   External rotation at 0°: 4-     Right Shoulder     Planes of Motion   External rotation at 0°: 4-     Tests     Left Shoulder   Positive Hawkin's and Neer's.            Precautions: DM, HTN, migraines.    Dx: L subacromial impingement.    Manuals 10/21            L shoulder PROM  5\"x5 ea           Laser L shoulder  4000J                                     Neuro Re-Ed                                                                                                        Ther Ex             TB ER Y/ 2x10 Y/ 3x10           TB rows  G/ 3x10           TB Hor ABD  Y/ 3x10           Wall slides  2x10           Standing mod sleeper stretch 10\"x2 10\"x3                                                               Ther Activity                                       Gait Training                                       Modalities                                            "

## 2024-10-24 ENCOUNTER — OFFICE VISIT (OUTPATIENT)
Dept: PHYSICAL THERAPY | Facility: CLINIC | Age: 49
End: 2024-10-24
Payer: COMMERCIAL

## 2024-10-24 DIAGNOSIS — M75.42 IMPINGEMENT SYNDROME OF LEFT SHOULDER: ICD-10-CM

## 2024-10-24 DIAGNOSIS — M25.512 ACUTE PAIN OF LEFT SHOULDER: Primary | ICD-10-CM

## 2024-10-24 DIAGNOSIS — M75.82 TENDINITIS OF LEFT ROTATOR CUFF: ICD-10-CM

## 2024-10-24 PROCEDURE — 97110 THERAPEUTIC EXERCISES: CPT | Performed by: PHYSICAL THERAPIST

## 2024-10-24 PROCEDURE — 97140 MANUAL THERAPY 1/> REGIONS: CPT | Performed by: PHYSICAL THERAPIST

## 2024-10-24 NOTE — PROGRESS NOTES
"Daily Note     Today's date: 10/24/2024  Patient name: Keeley Moore  : 1975  MRN: [id number]  Referring provider: Rony Mcmanus PA*  Dx:   Encounter Diagnosis     ICD-10-CM    1. Acute pain of left shoulder  M25.512       2. Tendinitis of left rotator cuff  M75.82       3. Impingement syndrome of left shoulder  M75.42                      Subjective: Pt reports 8/10 L post, sup and deep shoulder pain.    Objective: See treatment diary below    Assessment: Reviewed HEP, held mod sleeper stretch from HEP.  Added MT and SA strengthening.    Plan: Continue per plan of care.      Precautions: DM, HTN, migraines.    Dx: L subacromial impingement.    Manuals 10/21 10/24           L shoulder PROM  5\"x5 ea           Laser L shoulder  4000J                                     Neuro Re-Ed                                                                                                        Ther Ex             TB ER Y/ 2x10 Y/ 3x10           TB rows  G/ 3x10           TB Hor ABD  Y/ 3x10           Wall slides  3x10           Standing mod sleeper stretch 10\"x2            Supine SA punch  2#/ 3x10                                                  Ther Activity                                       Gait Training                                       Modalities                                            "

## 2024-10-28 ENCOUNTER — OFFICE VISIT (OUTPATIENT)
Dept: PHYSICAL THERAPY | Facility: CLINIC | Age: 49
End: 2024-10-28
Payer: COMMERCIAL

## 2024-10-28 DIAGNOSIS — M75.82 TENDINITIS OF LEFT ROTATOR CUFF: ICD-10-CM

## 2024-10-28 DIAGNOSIS — M25.512 ACUTE PAIN OF LEFT SHOULDER: Primary | ICD-10-CM

## 2024-10-28 DIAGNOSIS — M75.42 IMPINGEMENT SYNDROME OF LEFT SHOULDER: ICD-10-CM

## 2024-10-28 PROCEDURE — 97110 THERAPEUTIC EXERCISES: CPT

## 2024-10-28 PROCEDURE — 97140 MANUAL THERAPY 1/> REGIONS: CPT

## 2024-10-28 NOTE — PROGRESS NOTES
"Daily Note     Today's date: 10/28/2024  Patient name: Keeley Moore  : 1975  MRN: [id number]  Referring provider: Rony Mcmanus PA*  Dx:   Encounter Diagnosis     ICD-10-CM    1. Acute pain of left shoulder  M25.512       2. Tendinitis of left rotator cuff  M75.82       3. Impingement syndrome of left shoulder  M75.42                      Subjective: Pt reports irritability is much lower after holding the painful stretch and adding laser. She still has pain whenever she goes behind her back.    Objective: See treatment diary below    Assessment: Tolerated POC very well. Good response to modifications made last visit, recommended to continue.     Plan: Continue per plan of care.      Precautions: DM, HTN, migraines.    Dx: L subacromial impingement.    Manuals 10/21 10/24 10/28          L shoulder PROM  5\"x5 ea 5\"x5 ea          Laser L shoulder  4000J 4000J                                    Neuro Re-Ed                                                                                                        Ther Ex             TB ER Y/ 2x10 Y/ 3x10 Y/ 3x10          TB rows  G/ 3x10 G/ 3x10          TB Hor ABD  Y/ 3x10 Y/ 3x10          Wall slides  3x10 3x10          Standing mod sleeper stretch 10\"x2            Supine SA punch  2#/ 3x10 2#/ 3x10                                                 Ther Activity                                       Gait Training                                       Modalities                                            "

## 2024-10-30 ENCOUNTER — OFFICE VISIT (OUTPATIENT)
Dept: PHYSICAL THERAPY | Facility: CLINIC | Age: 49
End: 2024-10-30
Payer: COMMERCIAL

## 2024-10-30 DIAGNOSIS — M75.42 IMPINGEMENT SYNDROME OF LEFT SHOULDER: ICD-10-CM

## 2024-10-30 DIAGNOSIS — M75.82 TENDINITIS OF LEFT ROTATOR CUFF: ICD-10-CM

## 2024-10-30 DIAGNOSIS — M25.512 ACUTE PAIN OF LEFT SHOULDER: Primary | ICD-10-CM

## 2024-10-30 PROCEDURE — 97110 THERAPEUTIC EXERCISES: CPT

## 2024-10-30 NOTE — PROGRESS NOTES
"Daily Note     Today's date: 10/30/2024  Patient name: Keeley Moore  : 1975  MRN: [id number]  Referring provider: Rony Mcmanus PA*  Dx:   Encounter Diagnosis     ICD-10-CM    1. Acute pain of left shoulder  M25.512       2. Tendinitis of left rotator cuff  M75.82       3. Impingement syndrome of left shoulder  M75.42                      Subjective: Pt reports 4/10 left shoulder pain pre tx.     Objective: See treatment diary below    Assessment: Pt demonstrated ability to progress left shoulder strengthening without increasing pain. Pt continues to demonstrated moderate restriction with shoulder IR AROM.     Plan: Continue per plan of care.      Precautions: DM, HTN, migraines.    Dx: L subacromial impingement.    Manuals 10/21 10/24 10/28 10/30         L shoulder PROM  5\"x5 ea 5\"x5 ea 5\"x5 ea         Laser L shoulder  4000J 4000J 4000J                                   Neuro Re-Ed                                                                                                        Ther Ex             TB ER Y/ 2x10 Y/ 3x10 Y/ 3x10 Y/  3x12         TB rows  G/ 3x10 G/ 3x10 G/  3x15         TB Hor ABD  Y/ 3x10 Y/ 3x10 Y/  3x10         Wall slides  3x10 3x10 3x10         Standing mod sleeper stretch 10\"x2            Supine SA punch  2#/ 3x10 2#/ 3x10 3#  3x10                                                Ther Activity                                       Gait Training                                       Modalities                                            "

## 2024-11-05 ENCOUNTER — OFFICE VISIT (OUTPATIENT)
Dept: PHYSICAL THERAPY | Facility: CLINIC | Age: 49
End: 2024-11-05
Payer: COMMERCIAL

## 2024-11-05 DIAGNOSIS — M75.42 IMPINGEMENT SYNDROME OF LEFT SHOULDER: ICD-10-CM

## 2024-11-05 DIAGNOSIS — M25.512 ACUTE PAIN OF LEFT SHOULDER: Primary | ICD-10-CM

## 2024-11-05 DIAGNOSIS — M75.82 TENDINITIS OF LEFT ROTATOR CUFF: ICD-10-CM

## 2024-11-05 PROCEDURE — 97110 THERAPEUTIC EXERCISES: CPT | Performed by: PHYSICAL THERAPIST

## 2024-11-05 PROCEDURE — 97140 MANUAL THERAPY 1/> REGIONS: CPT | Performed by: PHYSICAL THERAPIST

## 2024-11-05 NOTE — PROGRESS NOTES
"Daily Note     Today's date: 2024  Patient name: Keeley Moore  : 1975  MRN: [id number]  Referring provider: Rony Mcmanus PA*  Dx:   Encounter Diagnosis     ICD-10-CM    1. Acute pain of left shoulder  M25.512       2. Tendinitis of left rotator cuff  M75.82       3. Impingement syndrome of left shoulder  M75.42                      Subjective: Pt reports 9/10 left shoulder sharp pain.     Objective: See treatment diary below    Assessment: Pt demonstrated min flex and ABD ROM restriction with end-range pain, mod restriction and pain with AROM IR.     Plan: Continue per plan of care.      Precautions: DM, HTN, migraines.    Dx: L subacromial impingement.    Manuals 10/21 10/24 10/28 10/30 11/5        L shoulder PROM  5\"x5 ea 5\"x5 ea 5\"x5 ea 5\"x5 ea        Laser L shoulder  4000J 4000J 4000J 4000J                                  Neuro Re-Ed                                                                                                        Ther Ex             TB ER Y/ 2x10 Y/ 3x10 Y/ 3x10 Y/  3x12 Y/ 3x12        TB rows  G/ 3x10 G/ 3x10 G/  3x15 Cj/ 3x10        TB Hor ABD  Y/ 3x10 Y/ 3x10 Y/  3x10 Y/ 3x10        Wall slides  3x10 3x10 3x10 3x10        Standing mod sleeper stretch 10\"x2            Supine SA punch  2#/ 3x10 2#/ 3x10 3#  3x10 3#/ 3x10                                               Ther Activity                                       Gait Training                                       Modalities                                            "

## 2024-11-07 ENCOUNTER — OFFICE VISIT (OUTPATIENT)
Dept: PHYSICAL THERAPY | Facility: CLINIC | Age: 49
End: 2024-11-07
Payer: COMMERCIAL

## 2024-11-07 DIAGNOSIS — M25.512 ACUTE PAIN OF LEFT SHOULDER: Primary | ICD-10-CM

## 2024-11-07 DIAGNOSIS — M75.42 IMPINGEMENT SYNDROME OF LEFT SHOULDER: ICD-10-CM

## 2024-11-07 DIAGNOSIS — M75.82 TENDINITIS OF LEFT ROTATOR CUFF: ICD-10-CM

## 2024-11-07 PROCEDURE — 97110 THERAPEUTIC EXERCISES: CPT

## 2024-11-07 NOTE — PROGRESS NOTES
"Daily Note     Today's date: 2024  Patient name: Keeley Moore  : 1975  MRN: [id number]  Referring provider: Rony Mcmanus PA*  Dx:   Encounter Diagnosis     ICD-10-CM    1. Acute pain of left shoulder  M25.512       2. Tendinitis of left rotator cuff  M75.82       3. Impingement syndrome of left shoulder  M75.42                      Subjective: Pt reports 6/10 left shoulder pain. Pt reports reaching behind her back is getting a little better but it's progressing slow.    Objective: See treatment diary below    Assessment: Pt demonstrated continued moderate restriction with shoulder IR PROM secondary to pain. Pt demonstrates increased tolerance to RTC strengthening.     Plan: Continue per plan of care.      Precautions: DM, HTN, migraines.    Dx: L subacromial impingement.    Manuals 10/21 10/24 10/28 10/30 11/5 11/7       L shoulder PROM  5\"x5 ea 5\"x5 ea 5\"x5 ea 5\"x5 ea 5\"x5 ea       Laser L shoulder  4000J 4000J 4000J 4000J 4000J                                 Neuro Re-Ed                                                                                                        Ther Ex             TB ER Y/ 2x10 Y/ 3x10 Y/ 3x10 Y/  3x12 Y/ 3x12 Y/ 3x15       TB rows  G/ 3x10 G/ 3x10 G/  3x15 Cj/ 3x10 Cj  3x12       TB Hor ABD  Y/ 3x10 Y/ 3x10 Y/  3x10 Y/ 3x10 Y/  3x10       Wall slides  3x10 3x10 3x10 3x10 3x10       Standing mod sleeper stretch 10\"x2            Supine SA punch  2#/ 3x10 2#/ 3x10 3#  3x10 3#/ 3x10 3#  3x10                                              Ther Activity                                       Gait Training                                       Modalities                                            "

## 2024-11-11 ENCOUNTER — OFFICE VISIT (OUTPATIENT)
Dept: PHYSICAL THERAPY | Facility: CLINIC | Age: 49
End: 2024-11-11
Payer: COMMERCIAL

## 2024-11-11 DIAGNOSIS — M75.42 IMPINGEMENT SYNDROME OF LEFT SHOULDER: ICD-10-CM

## 2024-11-11 DIAGNOSIS — M25.512 ACUTE PAIN OF LEFT SHOULDER: Primary | ICD-10-CM

## 2024-11-11 DIAGNOSIS — M75.82 TENDINITIS OF LEFT ROTATOR CUFF: ICD-10-CM

## 2024-11-11 PROCEDURE — 97110 THERAPEUTIC EXERCISES: CPT

## 2024-11-11 PROCEDURE — 97140 MANUAL THERAPY 1/> REGIONS: CPT

## 2024-11-11 NOTE — PROGRESS NOTES
"Daily Note     Today's date: 2024  Patient name: Keeley Moore  : 1975  MRN: [id number]  Referring provider: Rony Mcmanus PA*  Dx:   Encounter Diagnosis     ICD-10-CM    1. Acute pain of left shoulder  M25.512       2. Tendinitis of left rotator cuff  M75.82       3. Impingement syndrome of left shoulder  M75.42                      Subjective: Pt reports 6/10 left shoulder pain pre tx.    Objective: See treatment diary below    Assessment: Pt demonstrated minimal restriction with shoulder IR, flexion and abduction. Pt was able to progress strength today with minimal increased pain.     Plan: Continue per plan of care.      Precautions: DM, HTN, migraines.    Dx: L subacromial impingement.    Manuals 10/21 10/24 10/28 10/30 11/5 11/7 11/11      L shoulder PROM  5\"x5 ea 5\"x5 ea 5\"x5 ea 5\"x5 ea 5\"x5 ea 5\"x5 ea      Laser L shoulder  4000J 4000J 4000J 4000J 4000J 4000J                                Neuro Re-Ed                                                                                                        Ther Ex             TB ER Y/ 2x10 Y/ 3x10 Y/ 3x10 Y/  3x12 Y/ 3x12 Y/ 3x15 R  3x10      TB rows  G/ 3x10 G/ 3x10 G/  3x15 Cj/ 3x10 Cj  3x12 Blue  3x12      TB Hor ABD  Y/ 3x10 Y/ 3x10 Y/  3x10 Y/ 3x10 Y/  3x10 Y/  3x10      Wall slides  3x10 3x10 3x10 3x10 3x10 3x12      Standing mod sleeper stretch 10\"x2            Supine SA punch  2#/ 3x10 2#/ 3x10 3#  3x10 3#/ 3x10 3#  3x10 4#  2x10                                             Ther Activity                                       Gait Training                                       Modalities                                            "

## 2024-11-13 ENCOUNTER — OFFICE VISIT (OUTPATIENT)
Dept: NEUROLOGY | Facility: CLINIC | Age: 49
End: 2024-11-13
Payer: COMMERCIAL

## 2024-11-13 VITALS
DIASTOLIC BLOOD PRESSURE: 72 MMHG | BODY MASS INDEX: 41.83 KG/M2 | WEIGHT: 275.1 LBS | TEMPERATURE: 98 F | OXYGEN SATURATION: 100 % | HEART RATE: 100 BPM | SYSTOLIC BLOOD PRESSURE: 118 MMHG

## 2024-11-13 DIAGNOSIS — G43.109 MIGRAINE WITH AURA AND WITHOUT STATUS MIGRAINOSUS, NOT INTRACTABLE: Primary | ICD-10-CM

## 2024-11-13 PROCEDURE — 99213 OFFICE O/P EST LOW 20 MIN: CPT

## 2024-11-13 RX ORDER — ZOLMITRIPTAN 5 MG/1
5 TABLET, ORALLY DISINTEGRATING ORAL ONCE AS NEEDED
Qty: 9 TABLET | Refills: 3 | Status: SHIPPED | OUTPATIENT
Start: 2024-11-13

## 2024-11-13 NOTE — ASSESSMENT & PLAN NOTE
I had the pleasure of seeing Keeley today in the office at [facility] in [place].  She is presenting today for an office visit follow-up appointment in regard to her migraine headaches.  From the last visit, migraines are relatively the same having about 2-3 significant days out of the month of a migraine headache.  The patient states since her last visit she had tried to take the Maxalt disintegrating tablets for abortive therapy of the headaches.  She notes that she did try this twice and did try this at the beginning of a migraine headache and was not effective.  She notes that the Excedrin Migraine along with the menthol patches that she was using was much more effective for her.  Advised patient that we could certainly try a different abortive medication, we could try something like zolmitriptan disintegrating tablets instead as the patient does have difficulty with swallowing large pills or tablets.  Patient was compliant on giving zolmitriptan try and advised that she try to use this medication in place of the Maxalt.  She could certainly look into magnesium and B2 supplementation if she would like.  She has not started this since the last visit.  Advised the patient to follow-up again in about 6 months time for further evaluation.      Orders:    ZOLMitriptan (ZOMIG-ZMT) 5 MG disintegrating tablet; Take 1 tablet (5 mg total) by mouth once as needed for migraine

## 2024-11-13 NOTE — PATIENT INSTRUCTIONS
Headache Calendar  Please maintain a headache calendar  Consider using phone applications such as Migraine Panchito or Migraine Diary    Headache/migraine treatment:     Rescue medications (for immediate treatment of a headache):   It is ok to take ibuprofen, acetaminophen or naproxen (Advil, Tylenol,  Aleve, Excedrin) if they help your headaches you should limit these to No more than 3 times a week to avoid medication overuse/rebound headaches.     For your more moderate to severe migraines take this medication early   - Start Zolmitriptan 5 disintegrating tabs - take one at the onset of headache. May repeat one time after 2 hours if pain has not resolved.   (Max 2 a day and 9 a month)     - Discontinue Maxalt MLT as this is not effective for abortive therapy    Over the counter preventive supplements for headaches/migraines (if you try, try for 3 months straight)  (to take every day to help prevent headaches - not to take at the time of headache):  There are combo pills online of these - none of which regulated by FDA and double check dosing - take appropriate dose only once a day- prevent a migraine, migravent, mind ease, migrelief   [] Magnesium 400mg daily (If any diarrhea or upset stomach, decrease dose  as tolerated)  [] Riboflavin (Vitamin B2) 400mg daily (may make your urine bright/neon yellow)  - All supplements can be purchased online    Lifestyle Recommendations:  [x] SLEEP - Maintain a regular sleep schedule: Adults need at least 7-8 hours of uninterrupted a night. Maintain good sleep hygiene:  Going to bed and waking up at consistent times, avoiding excessive daytime naps, avoiding caffeinated beverages in the evening, avoid excessive stimulation in the evening and generally using bed primarily for sleeping.  One hour before bedtime would recommend turning lights down lower, decreasing your activity (may read quietly, listen to music at a low volume). When you get into bed, should eliminate all  technology (no texting, emailing, playing with your phone, iPad or tablet in bed).  [x] HYDRATION - Maintain good hydration.  Drink  2L of fluid a day (4 typical small water bottles)  [x] DIET - Maintain good nutrition. In particular don't skip meals and try and eat healthy balanced meals regularly.  [x] TRIGGERS - Look for other triggers and avoid them: Limit caffeine to 1-2 cups a day or less. Avoid dietary triggers that you have noticed bring on your headaches (this could include aged cheese, peanuts, MSG, aspartame and nitrates).  [x] EXERCISE - physical exercise as we all know is good for you in many ways, and not only is good for your heart, but also is beneficial for your mental health, cognitive health and  chronic pain/headaches. I would encourage at the least 5 days of physical exercise weekly for at least 30 minutes.     Education and Follow-up  [x] Please call with any questions or concerns. Of course if any new concerning symptoms go to the emergency department.  [x] Follow up in 6 months with Alonzo NAJERA

## 2024-11-13 NOTE — PROGRESS NOTES
Ambulatory Visit  Name: Keeley Moore      : 1975      MRN: [id number]  Encounter Provider: Jai Johnson PA-C  Encounter Date: 2024   Encounter department: [facility]    Assessment & Plan  Migraine with aura and without status migrainosus, not intractable  I had the pleasure of seeing Keeley today in the office at [facility] in [place].  She is presenting today for an office visit follow-up appointment in regard to her migraine headaches.  From the last visit, migraines are relatively the same having about 2-3 significant days out of the month of a migraine headache.  The patient states since her last visit she had tried to take the Maxalt disintegrating tablets for abortive therapy of the headaches.  She notes that she did try this twice and did try this at the beginning of a migraine headache and was not effective.  She notes that the Excedrin Migraine along with the menthol patches that she was using was much more effective for her.  Advised patient that we could certainly try a different abortive medication, we could try something like zolmitriptan disintegrating tablets instead as the patient does have difficulty with swallowing large pills or tablets.  Patient was compliant on giving zolmitriptan try and advised that she try to use this medication in place of the Maxalt.  She could certainly look into magnesium and B2 supplementation if she would like.  She has not started this since the last visit.  Advised the patient to follow-up again in about 6 months time for further evaluation.      Orders:    ZOLMitriptan (ZOMIG-ZMT) 5 MG disintegrating tablet; Take 1 tablet (5 mg total) by mouth once as needed for migraine      Patient Instructions     Headache Calendar  Please maintain a headache calendar  Consider using phone applications such as Migraine Buddy or Migraine Diary    Headache/migraine treatment:     Rescue medications (for  immediate treatment of a headache):   It is ok to take ibuprofen, acetaminophen or naproxen (Advil, Tylenol,  Aleve, Excedrin) if they help your headaches you should limit these to No more than 3 times a week to avoid medication overuse/rebound headaches.     For your more moderate to severe migraines take this medication early   - Start Zolmitriptan 5 disintegrating tabs - take one at the onset of headache. May repeat one time after 2 hours if pain has not resolved.   (Max 2 a day and 9 a month)     - Discontinue Maxalt MLT as this is not effective for abortive therapy    Over the counter preventive supplements for headaches/migraines (if you try, try for 3 months straight)  (to take every day to help prevent headaches - not to take at the time of headache):  There are combo pills online of these - none of which regulated by FDA and double check dosing - take appropriate dose only once a day- prevent a migraine, migravent, mind ease, migrelief   [] Magnesium 400mg daily (If any diarrhea or upset stomach, decrease dose  as tolerated)  [] Riboflavin (Vitamin B2) 400mg daily (may make your urine bright/neon yellow)  - All supplements can be purchased online    Lifestyle Recommendations:  [x] SLEEP - Maintain a regular sleep schedule: Adults need at least 7-8 hours of uninterrupted a night. Maintain good sleep hygiene:  Going to bed and waking up at consistent times, avoiding excessive daytime naps, avoiding caffeinated beverages in the evening, avoid excessive stimulation in the evening and generally using bed primarily for sleeping.  One hour before bedtime would recommend turning lights down lower, decreasing your activity (may read quietly, listen to music at a low volume). When you get into bed, should eliminate all technology (no texting, emailing, playing with your phone, iPad or tablet in bed).  [x] HYDRATION - Maintain good hydration.  Drink  2L of fluid a day (4 typical small water bottles)  [x] DIET -  Maintain good nutrition. In particular don't skip meals and try and eat healthy balanced meals regularly.  [x] TRIGGERS - Look for other triggers and avoid them: Limit caffeine to 1-2 cups a day or less. Avoid dietary triggers that you have noticed bring on your headaches (this could include aged cheese, peanuts, MSG, aspartame and nitrates).  [x] EXERCISE - physical exercise as we all know is good for you in many ways, and not only is good for your heart, but also is beneficial for your mental health, cognitive health and  chronic pain/headaches. I would encourage at the least 5 days of physical exercise weekly for at least 30 minutes.     Education and Follow-up  [x] Please call with any questions or concerns. Of course if any new concerning symptoms go to the emergency department.  [x] Follow up in 6 months with Alonzo NAJERA    History of Present Illness   HPI    For Review:    The patient was last seen in the office by myself on 02/22/2024.  At this time patient was presenting as a new patient consult in regard to her headaches.  Patient noted that she was having about 2-3 headache days per month.  Noted that she started having headaches around the age of 20 to 25 years old.  She noted that as far as treating her headaches menthol patches on her forehead and Excedrin Migraine medication was working best for her.  She did note that she had significant auras associated with the headaches.  No particular timeframe of when the aura started from when the patient will get a headache after.  She noticed that more so the headaches were behind her right eye at the front of the head.  She was having migrainous symptoms of nausea, problems with concentration, photophobia, phonophobia, and lightheadedness/dizziness.  When standing up too fast this will make the headaches worse.  No positional change headaches noted.  She had been following with [facility] neurology for the past few years.  She had an MRI brain many years ago which  she did not end up having any record of.  She had tried Maxalt in the past for abortive therapy of her headaches.  No previous preventative medications noted at this time.  The patient did not require any additional brain imaging at the time of the last appointment due to her not noticing any significant worsening of her headaches and the fact that she had been having them for many years.  With having about 2-3 significant headache days per month, recommended that she likely not need preventative medication.  Although she could certainly try 400 mg of magnesium 400 mg of vitamin B2 if she would like.  For abortive therapy recommended that the patient try Maxalt disintegrating tablets for abortive therapy of her headaches      Current medical illnesses: hypertension, migraines, type 2 diabetes mellitus, hyperlipidemia, obesity       What medications do you take or have you taken for your headaches?   Current Preventive:   None    Current Abortive:   Excedrin Migraine, Maxalt (not effective)    Prior Preventive:   None    Prior Abortive:   Tylenol, naproxen    Interval updates as of 11/13/2024:    Has been experiencing about 2-3 significant headache days per month at this time. Maxalt she tried twice, did not seem to work. Maxalt was taken right at the beginning of the headache and did not seem to help. Excedrin migraine and menthol patches applied to forehead seem to help her with migraines. Did not try magnesium and B2 supplementation at this time.       Review of Systems   Constitutional:  Negative for appetite change, fatigue and fever.   HENT: Negative.  Negative for hearing loss, tinnitus, trouble swallowing and voice change.    Eyes: Negative.  Negative for photophobia, pain and visual disturbance.   Respiratory: Negative.  Negative for shortness of breath.    Cardiovascular: Negative.  Negative for palpitations.   Gastrointestinal: Negative.  Negative for nausea and vomiting.   Endocrine: Negative.  Negative  for cold intolerance.   Genitourinary: Negative.  Negative for dysuria, frequency and urgency.   Musculoskeletal:  Negative for back pain, gait problem, myalgias, neck pain and neck stiffness.   Skin: Negative.  Negative for rash.   Allergic/Immunologic: Negative.    Neurological:  Positive for headaches. Negative for dizziness, tremors, seizures, syncope, facial asymmetry, speech difficulty, weakness, light-headedness and numbness.   Hematological: Negative.  Does not bruise/bleed easily.   Psychiatric/Behavioral: Negative.  Negative for confusion, hallucinations and sleep disturbance.    All other systems reviewed and are negative.    I have personally reviewed the MA's review of systems and made changes as necessary.    Medical History Reviewed by provider this encounter:       Past Medical History   Past Medical History:   Diagnosis Date    Arthritis     Benign essential hypertension     Resolved 2016     Diabetes mellitus (HCC)     Diabetes mellitus type 2, controlled (HCC)     Dyspnea and respiratory abnormality     Resolved 2015     Headache(784.0)     Migraine     Varicella     Vertigo      Past Surgical History:   Procedure Laterality Date    HAND SURGERY  2014    KNEE ARTHROPLASTY Right     WISDOM TOOTH EXTRACTION       Family History   Problem Relation Age of Onset    Diabetes unspecified Mother     Diabetes Mother     Lupus Mother     Diabetes type II Mother     Cancer Mother     Heart murmur Father     Other Father         Right leg amputation     Diabetes type II Father     Diabetes unspecified Sister     Diabetes Sister     Cancer Maternal Grandmother         Skin, also maybe breast.   age 80s.    No Known Problems Maternal Grandfather         mild dementia, 80s    Breast cancer Paternal Grandmother         50-60s,  age 92    Cancer Paternal Grandmother     Diabetes unspecified Paternal Grandfather     Arrhythmia Paternal Grandfather     Heart attack Paternal Grandfather     Heart  disease Paternal Grandfather          age 70 after pacemaker placement      Current Outpatient Medications on File Prior to Visit   Medication Sig Dispense Refill    atorvastatin (LIPITOR) 10 mg tablet TAKE 1 TABLET DAILY 90 tablet 1    Continuous Blood Gluc  (FreeStyle Shelly Ninole) BLANCHE Use 1 Units continuous 1 each 0    Continuous Glucose Sensor (FreeStyle Shelly 3 Sensor) MISC USE 1 SENSOR EVERY 14 DAYS 6 each 3    ergocalciferol (VITAMIN D2) 50,000 units TAKE 1 CAPSULE BY MOUTH ONE TIME PER WEEK 12 capsule 0    insulin glargine (Toujeo SoloStar) 300 units/mL CONCENTRATED U-300 injection pen (1-unit dial) Inject 15 Units under the skin daily at bedtime 9 mL 0    Insulin Pen Needle (BD Pen Needle Sindhu 2nd Gen) 32G X 4 MM MISC USE ONE PEN NEEDLE ONCE DAILY 100 each 6    lisinopril (ZESTRIL) 10 mg tablet TAKE 1 TABLET DAILY FOR BLOOD PRESSURE 90 tablet 1    meclizine (ANTIVERT) 12.5 MG tablet Take by mouth if needed for dizziness      metFORMIN (GLUCOPHAGE) 1000 MG tablet Take 1 tablet (1,000 mg total) by mouth 2 (two) times a day with meals 180 tablet 1    naproxen (NAPROSYN) 500 mg tablet Take 1 tablet (500 mg total) by mouth daily as needed for moderate pain (not with meloxicam)      tirzepatide (Mounjaro) 15 MG/0.5ML INJECT 0.5 ML (15 MG TOTAL) UNDER THE SKIN EVERY 7 DAYSStrength: 15 MG/0.5ML 6 mL 1    [DISCONTINUED] rizatriptan (Maxalt-MLT) 10 mg disintegrating tablet Take 1 tablet (10 mg total) by mouth as needed for migraine Take at the onset of migraine; if symptoms continue or return, may take another dose at least 2 hours after first dose. Take no more than 2 doses in a day. 10 tablet 3     No current facility-administered medications on file prior to visit.     Allergies   Allergen Reactions    Hydrocodone-Acetaminophen      Vicodin--Other reaction(s): BREATHING WAS FUNNY  Tingling sensation in mouth      Current Outpatient Medications on File Prior to Visit   Medication Sig Dispense  Refill    atorvastatin (LIPITOR) 10 mg tablet TAKE 1 TABLET DAILY 90 tablet 1    Continuous Blood Gluc  (FreeStyle Shelly Melrose) BLANCHE Use 1 Units continuous 1 each 0    Continuous Glucose Sensor (FreeStyle Shelly 3 Sensor) MISC USE 1 SENSOR EVERY 14 DAYS 6 each 3    ergocalciferol (VITAMIN D2) 50,000 units TAKE 1 CAPSULE BY MOUTH ONE TIME PER WEEK 12 capsule 0    insulin glargine (Toujeo SoloStar) 300 units/mL CONCENTRATED U-300 injection pen (1-unit dial) Inject 15 Units under the skin daily at bedtime 9 mL 0    Insulin Pen Needle (BD Pen Needle Sindhu 2nd Gen) 32G X 4 MM MISC USE ONE PEN NEEDLE ONCE DAILY 100 each 6    lisinopril (ZESTRIL) 10 mg tablet TAKE 1 TABLET DAILY FOR BLOOD PRESSURE 90 tablet 1    meclizine (ANTIVERT) 12.5 MG tablet Take by mouth if needed for dizziness      metFORMIN (GLUCOPHAGE) 1000 MG tablet Take 1 tablet (1,000 mg total) by mouth 2 (two) times a day with meals 180 tablet 1    naproxen (NAPROSYN) 500 mg tablet Take 1 tablet (500 mg total) by mouth daily as needed for moderate pain (not with meloxicam)      tirzepatide (Mounjaro) 15 MG/0.5ML INJECT 0.5 ML (15 MG TOTAL) UNDER THE SKIN EVERY 7 DAYSStrength: 15 MG/0.5ML 6 mL 1    [DISCONTINUED] rizatriptan (Maxalt-MLT) 10 mg disintegrating tablet Take 1 tablet (10 mg total) by mouth as needed for migraine Take at the onset of migraine; if symptoms continue or return, may take another dose at least 2 hours after first dose. Take no more than 2 doses in a day. 10 tablet 3     No current facility-administered medications on file prior to visit.      Social History     Tobacco Use    Smoking status: Never     Passive exposure: Never    Smokeless tobacco: Never   Vaping Use    Vaping status: Never Used   Substance and Sexual Activity    Alcohol use: Not Currently    Drug use: Never    Sexual activity: Yes     Partners: Male     Birth control/protection: None     Objective     /72 (BP Location: Left arm, Patient Position: Sitting,  Cuff Size: Adult)   Pulse 100   Temp 98 °F (36.7 °C) (Temporal)   Wt 125 kg (275 lb 1.6 oz)   SpO2 100%   BMI 41.83 kg/m²     Physical Exam  Neurologic Exam    Physical Exam:                                                                 Vitals:            Constitutional:    /72 (BP Location: Left arm, Patient Position: Sitting, Cuff Size: Adult)   Pulse 100   Temp 98 °F (36.7 °C) (Temporal)   Wt 125 kg (275 lb 1.6 oz)   SpO2 100%   BMI 41.83 kg/m²   BP Readings from Last 3 Encounters:   11/13/24 118/72   10/10/24 135/83   09/03/24 118/68     Pulse Readings from Last 3 Encounters:   11/13/24 100   10/10/24 77   09/03/24 89         Well developed, well nourished, well groomed. No dysmorphic features.       Psychiatric:  Normal behavior and appropriate affect        Neurological Examination:     Mental status/cognitive function:   Orientated to time, place and person. Recent and remote memory intact. Attention span and concentration as well as fund of knowledge are appropriate for age. Normal language and spontaneous speech.    Cranial Nerves:  II-visual fields full.   III, IV, VI-Pupils were equal, round, and reactive to light and accomodation. Extraocular movements were full and conjugate without nystagmus. Conjugate gaze, normal smooth pursuits, normal saccades   V-facial sensation symmetric.    VII-facial expression symmetric, intact forehead wrinkle, strong eye closure, symmetric smile    VIII-hearing grossly intact bilaterally   IX, X-palate elevation symmetric, no dysarthria.   XI-shoulder shrug strength intact    XII-tongue protrusion midline.    Motor Exam: symmetric bulk and tone throughout, no pronator drift. Power/strength 5/5 bilateral upper and lower extremities, no atrophy, fasciculations or abnormal movements noted.   Sensory: grossly intact light touch in all extremities.   Reflexes: brachioradialis 2+, biceps 2+, knee 2+ bilaterally  Coordination: Finger nose finger intact  bilaterally, no apparent dysmetria, ataxia or tremor noted  Gait: steady casual and tandem gait.        Administrative Statements   I have spent a total time of 20 minutes in caring for this patient on the day of the visit/encounter including Risks and benefits of tx options, Instructions for management, Patient and family education, Importance of tx compliance, Risk factor reductions, Impressions, Counseling / Coordination of care, Documenting in the medical record, Reviewing / ordering tests, medicine, procedures  , and Obtaining or reviewing history  .

## 2024-11-14 ENCOUNTER — OFFICE VISIT (OUTPATIENT)
Dept: PHYSICAL THERAPY | Facility: CLINIC | Age: 49
End: 2024-11-14
Payer: COMMERCIAL

## 2024-11-14 DIAGNOSIS — M75.42 IMPINGEMENT SYNDROME OF LEFT SHOULDER: ICD-10-CM

## 2024-11-14 DIAGNOSIS — M75.82 TENDINITIS OF LEFT ROTATOR CUFF: ICD-10-CM

## 2024-11-14 DIAGNOSIS — M25.512 ACUTE PAIN OF LEFT SHOULDER: Primary | ICD-10-CM

## 2024-11-14 PROCEDURE — 97110 THERAPEUTIC EXERCISES: CPT

## 2024-11-14 PROCEDURE — 97140 MANUAL THERAPY 1/> REGIONS: CPT

## 2024-11-14 NOTE — PROGRESS NOTES
PostPartum Progress Note        Subjective:      Post-Partum Day #2 after uncomplicated CS delivery and s/p Magnesium Sulfate seizure prophylaxis for preeclampsia with severe features.     Patient is without complaints. Lochia decreasing. Breast feeding. Pain is well controlled. She report flatus and bowel movement this morning. Patient is ambulating and urinating without complications. Tolerating full regular diet. Overall mother and baby are doing well.     Objective:      Vitals:    05/03/24 2205 05/03/24 2303 05/04/24 0300 05/04/24 0800   BP: 119/70 138/76 135/89 (!) 148/83   BP Location:   Right arm Left arm   Patient Position:   Lying Sitting   Pulse:  85 85 87   Resp: 18 18 18 18   Temp:  98.3 °F (36.8 °C) 97.8 °F (36.6 °C) 97.9 °F (36.6 °C)   TempSrc:  Oral Oral Temporal   SpO2: 100% 100% 100% 99%   Weight:       Height:             Intake/Output Summary (Last 24 hours) at 5/4/2024 1057  Last data filed at 5/3/2024 2205  Gross per 24 hour   Intake 1190.4 ml   Output 2900 ml   Net -1709.6 ml     Body mass index is 43.37 kg/m².    General: no acute distress, appropriate affect  Abdomen: soft, non-distended; Fundus firm and at the umbilicus. Incision: dressing intact, no oozing, appropriate TTP.   Extremities: non-tender, symmetric, trace edema, no tenderness of calves    Lab Results   Component Value Date/Time    ABORH A NEG 05/02/2024 04:42 PM    ABSCREEN NEG 05/02/2024 04:42 PM       CBC           Component Ref Range & Units 07:18  (5/4/24) 1 d ago  (5/3/24) 1 d ago  (5/3/24) 2 d ago  (5/2/24) 11 d ago  (4/23/24) 1 mo ago  (3/12/24) 2 mo ago  (2/15/24)   WBC 4.00 - 11.50 x10(3)/mcL 13.51 High  18.10 High  25.39 High  11.15 10.13 12.15 High  11.67 High    RBC 4.00 - 5.10 x10(6)/mcL 3.31 Low  3.45 Low  3.71 Low  4.02 3.75 Low  4.12 4.04   Hgb 11.8 - 16.0 g/dL 9.0 Low  9.2 Low  10.0 Low  10.7 Low  10.3 Low  11.7 Low  12.0   Hct 36.0 - 48.0 % 27.2 Low  28.0 Low  30.2 Low  32.7 Low           Recent Results  "Daily Note     Today's date: 2024  Patient name: Keeley Moore  : 1975  MRN: [id number]  Referring provider: Rony Mcmanus PA*  Dx:   Encounter Diagnosis     ICD-10-CM    1. Acute pain of left shoulder  M25.512       2. Tendinitis of left rotator cuff  M75.82       3. Impingement syndrome of left shoulder  M75.42                      Subjective: Pt reports 7/10 left shoulder pain pre tx. Pt reports increased pain for an hour following last treatment session.    Objective: See treatment diary below    Assessment: Held strength progressions today secondary to elevated pain levels. Pt demonstrated moderate ROM restrictions with L shoulder in all planes.     Plan: Continue per plan of care.      Precautions: DM, HTN, migraines.    Dx: L subacromial impingement.    Manuals 10/21 10/24 10/28 10/30 11/5 11/7 11/11 11/14     L shoulder PROM  5\"x5 ea 5\"x5 ea 5\"x5 ea 5\"x5 ea 5\"x5 ea 5\"x5 ea 5\"x5 ea     Laser L shoulder  4000J 4000J 4000J 4000J 4000J 4000J 4000J                               Neuro Re-Ed                                                                                                        Ther Ex             TB ER Y/ 2x10 Y/ 3x10 Y/ 3x10 Y/  3x12 Y/ 3x12 Y/ 3x15 R  3x10 R/ 3x10     TB rows  G/ 3x10 G/ 3x10 G/  3x15 Cj/ 3x10 Cj  3x12 Blue  3x12 Blue 3x12     TB Hor ABD  Y/ 3x10 Y/ 3x10 Y/  3x10 Y/ 3x10 Y/  3x10 Y/  3x10 Y/  3x10     Wall slides  3x10 3x10 3x10 3x10 3x10 3x12 3x10     Standing mod sleeper stretch 10\"x2            Supine SA punch  2#/ 3x10 2#/ 3x10 3#  3x10 3#/ 3x10 3#  3x10 4#  2x10 4#  2x10                                            Ther Activity                                       Gait Training                                       Modalities                                            " (from the past 336 hour(s))   CBC with Differential    Collection Time: 24 11:50 AM   Result Value Ref Range    WBC 18.10 (H) 4.00 - 11.50 x10(3)/mcL    Hgb 9.2 (L) 11.8 - 16.0 g/dL    Hct 28.0 (L) 36.0 - 48.0 %    Platelet 215 140 - 371 x10(3)/mcL   CBC with Differential    Collection Time: 24  1:44 AM   Result Value Ref Range    WBC 25.39 (H) 4.00 - 11.50 x10(3)/mcL    Hgb 10.0 (L) 11.8 - 16.0 g/dL    Hct 30.2 (L) 36.0 - 48.0 %    Platelet 222 140 - 371 x10(3)/mcL   CBC with Differential    Collection Time: 24  4:42 PM   Result Value Ref Range    WBC 11.15 4.00 - 11.50 x10(3)/mcL    Hgb 10.7 (L) 11.8 - 16.0 g/dL    Hct 32.7 (L) 36.0 - 48.0 %    Platelet 211 140 - 371 x10(3)/mcL          Assessment:     38 y.o.  S/P CS Post-Partum Day #2  - Doing Well      Plan:     1. Continue routine postpartum care ambulation encouraged   2. Plan for D/C in AM  3. Breast Feeding encouraged, lactation consult  4. Rhogam per protocol       Rebecca Keller MD  2024  11:05 AM

## 2024-11-21 ENCOUNTER — OFFICE VISIT (OUTPATIENT)
Dept: OBGYN CLINIC | Facility: CLINIC | Age: 49
End: 2024-11-21
Payer: COMMERCIAL

## 2024-11-21 VITALS
HEART RATE: 69 BPM | HEIGHT: 68 IN | DIASTOLIC BLOOD PRESSURE: 82 MMHG | SYSTOLIC BLOOD PRESSURE: 119 MMHG | BODY MASS INDEX: 41.68 KG/M2 | WEIGHT: 275 LBS

## 2024-11-21 DIAGNOSIS — M75.42 IMPINGEMENT SYNDROME OF LEFT SHOULDER: ICD-10-CM

## 2024-11-21 DIAGNOSIS — M25.512 ACUTE PAIN OF LEFT SHOULDER: ICD-10-CM

## 2024-11-21 DIAGNOSIS — M75.82 TENDINITIS OF LEFT ROTATOR CUFF: Primary | ICD-10-CM

## 2024-11-21 PROCEDURE — 99213 OFFICE O/P EST LOW 20 MIN: CPT | Performed by: PHYSICIAN ASSISTANT

## 2024-11-21 NOTE — PROGRESS NOTES
Orthopaedic Surgery - Office Note  Keeley Moore (49 y.o. female)   : 1975   MRN: [id number]  Encounter Date: 2024    Chief Complaint   Patient presents with    Left Shoulder - Follow-up         Assessment/Plan  Diagnoses and all orders for this visit:    Tendinitis of left rotator cuff  -     MRI shoulder left wo contrast; Future  -     Ambulatory Referral to Orthopedic Surgery; Future    Impingement syndrome of left shoulder  -     MRI shoulder left wo contrast; Future  -     Ambulatory Referral to Orthopedic Surgery; Future    Acute pain of left shoulder  -     MRI shoulder left wo contrast; Future  -     Ambulatory Referral to Orthopedic Surgery; Future    The diagnosis as well as treatment options were reviewed with the patient in the office today.  As she has failed conservative treatments including icing, oral anti-inflammatories, subacromial cortisone injection, and formal physical therapy with home exercise program-I would recommend an MRI to evaluate for full-thickness rotator cuff tear.  Patient will return to discuss the MRI with orthopedic shoulder surgeon.  Patient may hold formal physical therapy but continued home exercise program.     Return for Recheck with shoulder surgeon to discuss MRI of the left shoulder..        History of Present Illness  This is a previous patient with ongoing left shoulder pain.  She was seen by myself on 10/10/2024.  She received a subacromial cortisone injection at that time and was started into physical therapy.  She has been attending physical therapy but notes continued pain in the shoulder.  No new trauma is reported.  No paresthesias are noted.  She is a diabetic with her most recent hemoglobin A1c being 7.3.  Patient reports that she did receive a few hours of complete relief post cortisone injection but the pain and weakness has persisted.  She does not feel much better than when she first started treatment.  The pain is located in the lateral  "shoulder and is worse with reaching forward and behind the back.    Review of Systems  Pertinent items are noted in HPI.  All other systems were reviewed and are negative.    Physical Exam  /82 (BP Location: Right arm, Patient Position: Sitting, Cuff Size: Large)   Pulse 69   Ht 5' 8\" (1.727 m)   Wt 125 kg (275 lb)   BMI 41.81 kg/m²   Cons: Appears well.  No apparent distress.  Psych: Alert. Oriented x3.  Mood and affect normal.    Left shoulder has active forward flexion to 165 degrees in the office today.  Internal rotation is painful to the buttocks.  External Tatian is to 80 degrees.  She has pain and weakness to drop arm testing.  Internal rotation and external rotation strength of the shoulder is 4 out of 5 with pain.  She is neurovascular grossly intact in the left upper extremity.  She has no AC joint tenderness or bicipital groove tenderness.  She has a positive impingement sign.              Studies Reviewed  XR SHOULDER 2+ VW LEFT     INDICATION: M25.512: Pain in left shoulder.     COMPARISON: None     FINDINGS:     No acute fracture or dislocation.     No significant degenerative changes.     No lytic or blastic osseous lesion.     Unremarkable soft tissues.     IMPRESSION:     No acute osseous abnormality.        Computerized Assisted Algorithm (CAA) may have been used to analyze all applicable images.        Workstation performed: [id number]  X-ray images as well as reports were reviewed by myself in the office today and I agree with radiologist interpretation.  Physical therapy previous bleeding notes were reviewed for today's visit.    Procedures  No procedures today.    Medical, Surgical, Family, and Social History  The patient's medical history, family history, and social history, were reviewed and updated as appropriate.    Past Medical History:   Diagnosis Date    Arthritis     Benign essential hypertension     Resolved 8/16/2016     Diabetes mellitus (HCC)     Diabetes mellitus type " 2, controlled ([facility])     Dyspnea and respiratory abnormality     Resolved 2015     Headache(784.0)     Migraine     Varicella     Vertigo        Past Surgical History:   Procedure Laterality Date    HAND SURGERY  2014    KNEE ARTHROPLASTY Right     WISDOM TOOTH EXTRACTION         Family History   Problem Relation Age of Onset    Diabetes unspecified Mother     Diabetes Mother     Lupus Mother     Diabetes type II Mother     Cancer Mother     Heart murmur Father     Other Father         Right leg amputation     Diabetes type II Father     Diabetes unspecified Sister     Diabetes Sister     Cancer Maternal Grandmother         Skin, also maybe breast.   age 80s.    No Known Problems Maternal Grandfather         mild dementia, 80s    Breast cancer Paternal Grandmother         50-60s,  age 92    Cancer Paternal Grandmother     Diabetes unspecified Paternal Grandfather     Arrhythmia Paternal Grandfather     Heart attack Paternal Grandfather     Heart disease Paternal Grandfather          age 70 after pacemaker placement        Social History     Occupational History    Not on file   Tobacco Use    Smoking status: Never     Passive exposure: Never    Smokeless tobacco: Never   Vaping Use    Vaping status: Never Used   Substance and Sexual Activity    Alcohol use: Not Currently    Drug use: Never    Sexual activity: Yes     Partners: Male     Birth control/protection: None       Allergies   Allergen Reactions    Hydrocodone-Acetaminophen      Vicodin--Other reaction(s): BREATHING WAS FUNNY  Tingling sensation in mouth         Current Outpatient Medications:     atorvastatin (LIPITOR) 10 mg tablet, TAKE 1 TABLET DAILY, Disp: 90 tablet, Rfl: 1    Continuous Blood Gluc  (FreeStyle Shelly [place]) BLANCHE, Use 1 Units continuous, Disp: 1 each, Rfl: 0    Continuous Glucose Sensor (FreeStyle Shelly 3 Sensor) MISC, USE 1 SENSOR EVERY 14 DAYS, Disp: 6 each, Rfl: 3    ergocalciferol (VITAMIN D2) 50,000  units, TAKE 1 CAPSULE BY MOUTH ONE TIME PER WEEK, Disp: 12 capsule, Rfl: 0    insulin glargine (Toujeo SoloStar) 300 units/mL CONCENTRATED U-300 injection pen (1-unit dial), Inject 15 Units under the skin daily at bedtime, Disp: 9 mL, Rfl: 0    Insulin Pen Needle (BD Pen Needle Sindhu 2nd Gen) 32G X 4 MM MISC, USE ONE PEN NEEDLE ONCE DAILY, Disp: 100 each, Rfl: 6    lisinopril (ZESTRIL) 10 mg tablet, TAKE 1 TABLET DAILY FOR BLOOD PRESSURE, Disp: 90 tablet, Rfl: 1    meclizine (ANTIVERT) 12.5 MG tablet, Take by mouth if needed for dizziness, Disp: , Rfl:     metFORMIN (GLUCOPHAGE) 1000 MG tablet, Take 1 tablet (1,000 mg total) by mouth 2 (two) times a day with meals, Disp: 180 tablet, Rfl: 1    naproxen (NAPROSYN) 500 mg tablet, Take 1 tablet (500 mg total) by mouth daily as needed for moderate pain (not with meloxicam), Disp: , Rfl:     tirzepatide (Mounjaro) 15 MG/0.5ML, INJECT 0.5 ML (15 MG TOTAL) UNDER THE SKIN EVERY 7 DAYSStrength: 15 MG/0.5ML, Disp: 6 mL, Rfl: 1    ZOLMitriptan (ZOMIG-ZMT) 5 MG disintegrating tablet, Take 1 tablet (5 mg total) by mouth once as needed for migraine, Disp: 9 tablet, Rfl: 3      Rony Mcmanus PA-C

## 2024-12-03 ENCOUNTER — TELEPHONE (OUTPATIENT)
Dept: ENDOCRINOLOGY | Facility: CLINIC | Age: 49
End: 2024-12-03

## 2024-12-03 NOTE — TELEPHONE ENCOUNTER
Patient is requesting 90 days prescription from [facility] pharmacy for VIT D-2 (ERGOCAL) 1.25MG 50,000U    Refills: 3    Form scanned in media.

## 2024-12-05 DIAGNOSIS — E55.9 VITAMIN D DEFICIENCY: ICD-10-CM

## 2024-12-05 RX ORDER — ERGOCALCIFEROL 1.25 MG/1
50000 CAPSULE, LIQUID FILLED ORAL WEEKLY
Qty: 12 CAPSULE | Refills: 1 | Status: SHIPPED | OUTPATIENT
Start: 2024-12-05

## 2024-12-09 ENCOUNTER — HOSPITAL ENCOUNTER (OUTPATIENT)
Dept: RADIOLOGY | Age: 49
Discharge: HOME | End: 2024-12-09
Payer: COMMERCIAL

## 2024-12-09 DIAGNOSIS — M25.512 ACUTE PAIN OF LEFT SHOULDER: ICD-10-CM

## 2024-12-09 DIAGNOSIS — M75.42 IMPINGEMENT SYNDROME OF LEFT SHOULDER: ICD-10-CM

## 2024-12-09 DIAGNOSIS — M75.82 TENDINITIS OF LEFT ROTATOR CUFF: ICD-10-CM

## 2024-12-09 PROCEDURE — 73221 MRI JOINT UPR EXTREM W/O DYE: CPT

## 2024-12-12 ENCOUNTER — TELEPHONE (OUTPATIENT)
Dept: ENDOCRINOLOGY | Facility: CLINIC | Age: 49
End: 2024-12-12

## 2024-12-12 ENCOUNTER — RESULTS FOLLOW-UP (OUTPATIENT)
Dept: OBGYN CLINIC | Facility: OTHER | Age: 49
End: 2024-12-12

## 2024-12-12 DIAGNOSIS — E11.65 TYPE 2 DIABETES MELLITUS WITH HYPERGLYCEMIA, WITHOUT LONG-TERM CURRENT USE OF INSULIN (HCC): ICD-10-CM

## 2024-12-12 NOTE — TELEPHONE ENCOUNTER
[facility] is out of stock for castro 3, but have castro 3+ available. Please send order for castro 3+.    Form scanned in media.

## 2024-12-13 RX ORDER — ACYCLOVIR 800 MG/1
TABLET ORAL
Qty: 9 EACH | Refills: 1 | Status: SHIPPED | OUTPATIENT
Start: 2024-12-13 | End: 2024-12-19

## 2024-12-16 ENCOUNTER — OFFICE VISIT (OUTPATIENT)
Dept: OBGYN CLINIC | Facility: CLINIC | Age: 49
End: 2024-12-16
Payer: COMMERCIAL

## 2024-12-16 VITALS
DIASTOLIC BLOOD PRESSURE: 73 MMHG | SYSTOLIC BLOOD PRESSURE: 106 MMHG | WEIGHT: 279 LBS | BODY MASS INDEX: 42.28 KG/M2 | HEART RATE: 80 BPM | HEIGHT: 68 IN

## 2024-12-16 DIAGNOSIS — M75.02 ADHESIVE CAPSULITIS OF LEFT SHOULDER: Primary | ICD-10-CM

## 2024-12-16 DIAGNOSIS — M75.42 IMPINGEMENT SYNDROME OF LEFT SHOULDER: ICD-10-CM

## 2024-12-16 DIAGNOSIS — M75.82 TENDINITIS OF LEFT ROTATOR CUFF: ICD-10-CM

## 2024-12-16 DIAGNOSIS — M25.512 ACUTE PAIN OF LEFT SHOULDER: ICD-10-CM

## 2024-12-16 PROCEDURE — 99213 OFFICE O/P EST LOW 20 MIN: CPT | Performed by: ORTHOPAEDIC SURGERY

## 2024-12-16 NOTE — PROGRESS NOTES
CHIEF COMPLAIN/REASON FOR VISIT  Chief Complaint   Patient presents with    Left Shoulder - Pain       HISTORY OF PRESENT ILLNESS  Keeley Moore is a RHD 49 y.o. female who presents for evaluation of their left shoulder. Patient was referred to the clinic by Rony Mcmanus PA-C. She was given a CSI on 10/10/24 which provided relief only for a few days. She attempted physical therapy which did no provide any relief. She was ordered an MRI in which she presents today to discuss the results. Today, patient claims that she is experiencing pain with overhead activities. She is unable to lay on her left side.     REVIEW OF SYSTEMS  Review of systems was performed and, woutside that mentioned in the HPI, it was negative for symptomology related to the integumentary, hematologic, immunologic, allergic, neurologic, cardiovascular, respiratory, GI or  systems.     MEDICAL HISTORY  Patient Active Problem List   Diagnosis    Type 2 diabetes mellitus with hyperglycemia, without long-term current use of insulin (HCC)    Hyperlipidemia    Mild essential hypertension    Right wrist tendonitis    Class 3 severe obesity due to excess calories with serious comorbidity and body mass index (BMI) of 40.0 to 44.9 in adult ([facility])    Primary osteoarthritis of left knee    Migraines       SURGICAL HISTORY  Past Surgical History:   Procedure Laterality Date    HAND SURGERY  09/2014    KNEE ARTHROPLASTY Right     WISDOM TOOTH EXTRACTION         CURRENT MEDICATIONS    Current Outpatient Medications:     atorvastatin (LIPITOR) 10 mg tablet, TAKE 1 TABLET DAILY, Disp: 90 tablet, Rfl: 1    Continuous Blood Gluc  (FreeStyle Shelly [place]) [facility], Use 1 Units continuous, Disp: 1 each, Rfl: 0    Continuous Glucose Sensor (FreeStyle Shelly 3 Sensor) MISC, USE 1 SENSOR EVERY 14 DAYS, Disp: 9 each, Rfl: 1    ergocalciferol (VITAMIN D2) 50,000 units, Take 1 capsule (50,000 Units total) by mouth once a week, Disp: 12 capsule, Rfl: 1    insulin glargine  (Toujeo SoloStar) 300 units/mL CONCENTRATED U-300 injection pen (1-unit dial), Inject 15 Units under the skin daily at bedtime, Disp: 9 mL, Rfl: 0    Insulin Pen Needle (BD Pen Needle Sindhu 2nd Gen) 32G X 4 MM MISC, USE ONE PEN NEEDLE ONCE DAILY, Disp: 100 each, Rfl: 6    lisinopril (ZESTRIL) 10 mg tablet, TAKE 1 TABLET DAILY FOR BLOOD PRESSURE, Disp: 90 tablet, Rfl: 1    meclizine (ANTIVERT) 12.5 MG tablet, Take by mouth if needed for dizziness, Disp: , Rfl:     metFORMIN (GLUCOPHAGE) 1000 MG tablet, Take 1 tablet (1,000 mg total) by mouth 2 (two) times a day with meals, Disp: 180 tablet, Rfl: 1    naproxen (NAPROSYN) 500 mg tablet, Take 1 tablet (500 mg total) by mouth daily as needed for moderate pain (not with meloxicam), Disp: , Rfl:     tirzepatide (Mounjaro) 15 MG/0.5ML, INJECT 0.5 ML (15 MG TOTAL) UNDER THE SKIN EVERY 7 DAYSStrength: 15 MG/0.5ML, Disp: 6 mL, Rfl: 1    ZOLMitriptan (ZOMIG-ZMT) 5 MG disintegrating tablet, Take 1 tablet (5 mg total) by mouth once as needed for migraine, Disp: 9 tablet, Rfl: 3    SOCIAL HISTORY  Social History     Socioeconomic History    Marital status: /Civil Union     Spouse name: Eron     Number of children: Not on file    Years of education: Not on file    Highest education level: Not on file   Occupational History    Not on file   Tobacco Use    Smoking status: Never     Passive exposure: Never    Smokeless tobacco: Never   Vaping Use    Vaping status: Never Used   Substance and Sexual Activity    Alcohol use: Not Currently    Drug use: Never    Sexual activity: Yes     Partners: Male     Birth control/protection: None   Other Topics Concern    Not on file   Social History Narrative    Not on file     Social Drivers of Health     Financial Resource Strain: Not on file   Food Insecurity: Not on file   Transportation Needs: Not on file   Physical Activity: Not on file   Stress: Not on file   Social Connections: Not on file   Intimate Partner Violence: Not on file  "  Housing Stability: Not on file       Objective     VITAL SIGNS  /73 (BP Location: Right arm, Patient Position: Sitting, Cuff Size: Large)   Pulse 80   Ht 5' 8\" (1.727 m) Comment: verbal  Wt 127 kg (279 lb)   BMI 42.42 kg/m²      PHYSICAL EXAM  General:   Well-appearing  No acute distress  Appears stated age    Cervical Spine  No tenderness to palpation over the cervical spine in the midline or of the paraspinal muscles  Full range of motion without pain  Negative spurlings   Negative Lhermitte test    Left Shoulder  Negative tenderness to palpation over the acromioclavicular joint  Negative tenderness to palpation over the long head of the biceps tendon  Shoulder effusion none present  Shoulder abduction 160 / 180 painful  Shoulder forward flexion 160 / 180  Shoulder external rotation 50/50  Shoulder internal rotation T7/T7  Supraspinatus/ABD +4/5   Infraspinatus/ER +4/5   Negative Belly Press/SS  Negative Neer  Negative Reynoso  Negative O'briens  Negative Apprehension  Negative Relocation  Negative Posterior Jerk  Negative Sulcus  Skin is intact with no erythema, warmth or drainage  Motor strength intact distally  Sensation to light touch is normal in the axillary, radial, ulnar, and median nerve distributions.  Fingers warm and perfused    RADIOGRAPHIC EXAMINATION/DIAGNOSTICS:  Independent interpretation of the left shoulder MRI demonstrates mild tendinosis with intact rotator cuff tendons, impingement syndrome of the left shoulder    ASSESSMENT/PLAN:  Left shoulder adhesive capsulitis   Referral to physical therapy was given  Discussed that this type of injury can take several months to resolve  F/U as needed. Please call with any questions or concerns        Scribe Attestation      I,:  Ten Rowe am acting as a scribe while in the presence of the attending physician.:       I,:  Zaki Ziegler MD personally performed the services described in this documentation    as scribed in my presence.:       "

## 2024-12-19 DIAGNOSIS — E11.65 TYPE 2 DIABETES MELLITUS WITH HYPERGLYCEMIA, WITHOUT LONG-TERM CURRENT USE OF INSULIN (HCC): Primary | ICD-10-CM

## 2024-12-19 RX ORDER — HYDROCHLOROTHIAZIDE 12.5 MG/1
CAPSULE ORAL
Qty: 6 EACH | Refills: 1 | Status: SHIPPED | OUTPATIENT
Start: 2024-12-19

## 2024-12-20 DIAGNOSIS — E78.5 HYPERLIPIDEMIA, UNSPECIFIED HYPERLIPIDEMIA TYPE: ICD-10-CM

## 2024-12-20 DIAGNOSIS — I10 MILD ESSENTIAL HYPERTENSION: ICD-10-CM

## 2024-12-20 RX ORDER — ATORVASTATIN CALCIUM 10 MG/1
10 TABLET, FILM COATED ORAL DAILY
Qty: 90 TABLET | Refills: 1 | Status: SHIPPED | OUTPATIENT
Start: 2024-12-20

## 2024-12-20 RX ORDER — LISINOPRIL 10 MG/1
10 TABLET ORAL DAILY
Qty: 90 TABLET | Refills: 1 | Status: SHIPPED | OUTPATIENT
Start: 2024-12-20

## 2024-12-26 ENCOUNTER — EVALUATION (OUTPATIENT)
Dept: PHYSICAL THERAPY | Facility: OTHER | Age: 49
End: 2024-12-26
Payer: COMMERCIAL

## 2024-12-26 DIAGNOSIS — M75.42 IMPINGEMENT SYNDROME OF LEFT SHOULDER: ICD-10-CM

## 2024-12-26 DIAGNOSIS — M25.512 ACUTE PAIN OF LEFT SHOULDER: ICD-10-CM

## 2024-12-26 DIAGNOSIS — M75.02 ADHESIVE CAPSULITIS OF LEFT SHOULDER: ICD-10-CM

## 2024-12-26 DIAGNOSIS — M75.82 TENDINITIS OF LEFT ROTATOR CUFF: ICD-10-CM

## 2024-12-26 PROCEDURE — 97162 PT EVAL MOD COMPLEX 30 MIN: CPT

## 2024-12-26 PROCEDURE — 97110 THERAPEUTIC EXERCISES: CPT

## 2024-12-26 NOTE — PROGRESS NOTES
PT Evaluation     Today's date: 2024  Patient name: Keeley Moore  : 1975  MRN: [id number]  Referring provider: Zaki Ziegler MD  Dx:   Encounter Diagnosis     ICD-10-CM    1. Tendinitis of left rotator cuff  M75.82 Ambulatory Referral to Physical Therapy      2. Impingement syndrome of left shoulder  M75.42 Ambulatory Referral to Physical Therapy      3. Acute pain of left shoulder  M25.512 Ambulatory Referral to Physical Therapy      4. Adhesive capsulitis of left shoulder  M75.02 Ambulatory Referral to Physical Therapy          Start Time: 725  Stop Time: 810  Total time in clinic (min): 45 minutes    Assessment  Impairments: abnormal coordination, abnormal muscle firing, abnormal muscle tone, abnormal or restricted ROM, abnormal movement, activity intolerance, impaired physical strength, lacks appropriate home exercise program, pain with function, poor posture , unable to perform ADL, participation limitations, activity limitations and endurance  Symptom irritability: high    Assessment details: Problem List/Impairments:  1) L Shoulder Hypomobility  2) L Shoulder neuromotor/force deficits    Keeley Moore is a pleasant 49 y.o. female who presents with chronic left shoulder pain. She presents upon exam with problem list/impairments noted above, nociceptive/nociplastic pain type, and medical diagnosis of adhesive capsulitis resulting in the pain she is experiencing, worry over not knowing what's wrong, concern at no signs of improvement, fear of not being able to keep active, and future ill health (and wanting to prevent it). No further referral appears necessary at this time based upon examination results. I expect she will improve over the course of 6-8 months given the nature of adhesive capsulitis. Positive prognostic indicators include positive attitude toward recovery, good understanding of diagnosis and treatment plan options, absence of peripheralization, and absence of observed red  flags. Negative prognostic indicators include chronicity of symptoms, high symptom irritability, multiple concurrent orthopedic problems, and multiple prior failed treatments. Patient was provided with a customized home exercise program to begin performing on their own. All patient questions and concerns have been addressed at this time. Thank you for the kind referral.  Understanding of Dx/Px/POC: good     Prognosis: fair    Goals  Short Term Goals:   1. Patient will be independent with a customized HEP.  2. Patient will report at least 40% improvement overall with performance of ADL's.  3. Patient will demonstrate normalized L shoulder ROM all planes.  4. Patient will demonstrate 4/5 MMT or better L shoulder all planes.     Long Term Goals:   1. Patient will improve pain with activity to 2/10 or less.  2. Patient will continue with HEP independence to allow for decreased future reoccurrence of pain and loss in function.  3. Patient will report at least 80% improvement overall with performance of ADL's.    Plan  Patient would benefit from: PT eval and skilled physical therapy  Planned modality interventions: cryotherapy, TENS, thermotherapy: hydrocollator packs, low level laser therapy and electrical stimulation/Russian stimulation  Other planned modality interventions: EPAT.    Planned therapy interventions: manual therapy, massage, joint mobilization, coordination, graded exercise, graded activity, functional ROM exercises, therapeutic exercise, therapeutic activities, patient education, neuromuscular re-education, home exercise program, flexibility, strengthening, IASTM, kinesiology taping, Bautista taping, nerve gliding, balance/weight bearing training, body mechanics training and stretching    Frequency: 1-2x week  Duration in weeks: 24  Plan of Care beginning date: 12/26/2024  Plan of Care expiration date: 6/12/2025  Treatment plan discussed with: patient  Plan details: Prognosis above is given PT services  "2x/week tapering to 1x/week over the next 6-8 months and home program adherence.        Subjective Evaluation    History of Present Illness  Mechanism of injury: Patient is a 49 y.o. female presenting to physical therapy with chief complaint of left shoulder pain. Patient reports this episode of pain/symptoms has been going on for months. Patient reports having had physical therapy for this in the past with no significant relief. Patient reports difficulty with ADL's/overhead activities/getting dressed/bathing. Patient denies any numbness/tingling of L UE.   Quality of life: fair    Patient Goals  Patient goals for therapy: independence with ADLs/IADLs, decreased pain, increased strength and increased motion  Patient goal: \"I want to have less pain so I can use my arm like normal again.\"  Pain  Current pain ratin  At best pain ratin  At worst pain rating: 10  Location: surrounding left shoulder  Quality: dull ache, tight and pulling  Relieving factors: rest  Aggravating factors: lifting and overhead activity (use of L UE)    Social Support    Employment status: working  Treatments  Previous treatment: physical therapy, medication and injection treatment        Objective    Posture: Forward head/rounded shoulders/kyphotic t/spine  Palpation: TTP posterior RTC  Dermatome: (pinprick- L/R): Intact B/L UE       Reflexes:  (L/R) C5-6: 2+ B/L  Garrido:  - B/L                     Cervical  % of normal   Flex. WFL   Extn. WFL   SB Left WFL   SB Right WFL   ROT Left WFL   ROT Right WFL         MMT         AROM          PROM    Shoulder       L       R        L           R      L     R   Flex. NT  110 P WFL  P   Abd. NT  90 WFL WFL 90    IR. 4 5 Greater troch P! WFL WFL At side to stomach   ER. 4 5 Occiput P! WFL WFL At side 45            Rhomboid         Mid Trap         Low Trap         Serratus         Infraspnatus         Teres Major         Sub Scap           Segmental mobility: GHJ: L Hypomobile TS: " "Hypomobile    Repeated C/s retractions improved shoulder pain from 8/10 to 7/10  C/s retractions with thoracic extension supine improved pain to 6/10         Precautions: High symptom irritability.     HEP Access Code: [email or web address]     POC expires Unit limit Auth Expiration date PT/OT + Visit Limit?   6/12/25 BOMN N/A 60 PCY (18 used)                             Visit 1 IE            Manuals 12/26            Shoulder PROM             Soft Tissue Deformation                                                                 Assessment             Neuro Re-Ed             Scapular retraction             Functional IR with scap retract                                                                              Ther Ex             C/s retraction Supine 2x10    Seated 2x10 HEP            C/s retraction + ext             T/spine ext supine             Shoulder flexion cane 10x5\" HEP                                                                Ther Activity                                       Gait Training                                       Modalities                                            "

## 2025-01-02 ENCOUNTER — OFFICE VISIT (OUTPATIENT)
Dept: PHYSICAL THERAPY | Facility: OTHER | Age: 50
End: 2025-01-02
Payer: COMMERCIAL

## 2025-01-02 DIAGNOSIS — M75.02 ADHESIVE CAPSULITIS OF LEFT SHOULDER: ICD-10-CM

## 2025-01-02 DIAGNOSIS — M25.512 ACUTE PAIN OF LEFT SHOULDER: ICD-10-CM

## 2025-01-02 DIAGNOSIS — M75.82 TENDINITIS OF LEFT ROTATOR CUFF: Primary | ICD-10-CM

## 2025-01-02 DIAGNOSIS — M75.42 IMPINGEMENT SYNDROME OF LEFT SHOULDER: ICD-10-CM

## 2025-01-02 PROCEDURE — 97110 THERAPEUTIC EXERCISES: CPT

## 2025-01-02 PROCEDURE — 97140 MANUAL THERAPY 1/> REGIONS: CPT

## 2025-01-02 NOTE — PROGRESS NOTES
"Daily Note     Today's date: 2025  Patient name: Keeley Moore  : 1975  MRN: [id number]  Referring provider: No ref. provider found  Dx:   Encounter Diagnosis     ICD-10-CM    1. Tendinitis of left rotator cuff  M75.82       2. Impingement syndrome of left shoulder  M75.42       3. Acute pain of left shoulder  M25.512       4. Adhesive capsulitis of left shoulder  M75.02           Start Time: 1505  Stop Time: 1538  Total time in clinic (min): 33 minutes  1 on 1 23 minutes all else IEP/MH    Subjective: Patient reports she had a setback over the Holidays and that her whole left shoulder is aching.       Objective: See treatment diary below      Assessment: Tolerated treatment well with focus on mobility and gentle AAROM/stretching. Patient demonstrated fatigue post treatment, exhibited good technique with therapeutic exercises, and would benefit from continued PT.      Plan: Continue per plan of care.      Precautions: High symptom irritability.     HEP Access Code: [email or web address]     POC expires Unit limit Auth Expiration date PT/OT + Visit Limit?   25 BOMN N/A 60 PCY (18 used)                             Visit 1 IE 2           Manuals            Shoulder PROM  GJL           Soft Tissue Deformation                                                                 Assessment             Neuro Re-Ed             Scapular retraction  3x10           Functional IR with scap retract                                                                              Ther Ex             C/s retraction Supine 2x10    Seated 2x10 HEP Seated 3x10           C/s retraction + ext             T/spine ext supine             Pball roll flexion  3x10           Shoulder flexion cane 10x5\" HEP 2x10x5\"                                                               Ther Activity                                       Gait Training                                       Modalities               5' pre L Shld                      "

## 2025-01-06 ENCOUNTER — OFFICE VISIT (OUTPATIENT)
Dept: PHYSICAL THERAPY | Facility: OTHER | Age: 50
End: 2025-01-06
Payer: COMMERCIAL

## 2025-01-06 DIAGNOSIS — M25.512 ACUTE PAIN OF LEFT SHOULDER: ICD-10-CM

## 2025-01-06 DIAGNOSIS — M75.42 IMPINGEMENT SYNDROME OF LEFT SHOULDER: ICD-10-CM

## 2025-01-06 DIAGNOSIS — M75.02 ADHESIVE CAPSULITIS OF LEFT SHOULDER: ICD-10-CM

## 2025-01-06 DIAGNOSIS — M75.82 TENDINITIS OF LEFT ROTATOR CUFF: Primary | ICD-10-CM

## 2025-01-06 PROCEDURE — 97110 THERAPEUTIC EXERCISES: CPT

## 2025-01-06 PROCEDURE — 97140 MANUAL THERAPY 1/> REGIONS: CPT

## 2025-01-06 NOTE — PROGRESS NOTES
"Daily Note     Today's date: 2025  Patient name: Keeley Moore  : 1975  MRN: [id number]  Referring provider: No ref. provider found  Dx:   Encounter Diagnosis     ICD-10-CM    1. Tendinitis of left rotator cuff  M75.82       2. Impingement syndrome of left shoulder  M75.42       3. Acute pain of left shoulder  M25.512       4. Adhesive capsulitis of left shoulder  M75.02           Start Time: 1600  Stop Time: 1624  Total time in clinic (min): 24 minutes      Subjective: Patient reports she did a little shoveling of snow today and her shoulder feels a little tight.       Objective: See treatment diary below      Assessment: Tolerated treatment well with focus on mobility and gentle AAROM/stretching. Patient demonstrated fatigue post treatment, exhibited good technique with therapeutic exercises, and would benefit from continued PT.      Plan: Continue per plan of care.      Precautions: High symptom irritability.     HEP Access Code: [email or web address]     POC expires Unit limit Auth Expiration date PT/OT + Visit Limit?   25 BOMN N/A 60 PCY (18 used)                             Visit 1 IE 2 3          Manuals           Shoulder PROM  GJL GJL          Soft Tissue Deformation                                                                 Assessment             Neuro Re-Ed             Scapular retraction  3x10 3x10          Functional IR with scap retract   With strap 10x3\"                                                                            Ther Ex             C/s retraction Supine 2x10    Seated 2x10 HEP Seated 3x10 Supine 3x10          C/s retraction + ext             T/spine ext chair   30x5\"           Pball roll flexion  3x10 3x10          Shoulder flexion cane 10x5\" HEP 2x10x5\" 3x10x5\"                                                               Ther Activity                                       Gait Training                                       Modalities               5' pre L Shld "

## 2025-01-12 LAB — HBA1C MFR BLD: 6.3 % OF TOTAL HGB

## 2025-01-13 ENCOUNTER — RESULTS FOLLOW-UP (OUTPATIENT)
Age: 50
End: 2025-01-13

## 2025-01-15 ENCOUNTER — OFFICE VISIT (OUTPATIENT)
Dept: PHYSICAL THERAPY | Facility: OTHER | Age: 50
End: 2025-01-15
Payer: COMMERCIAL

## 2025-01-15 DIAGNOSIS — M75.02 ADHESIVE CAPSULITIS OF LEFT SHOULDER: ICD-10-CM

## 2025-01-15 DIAGNOSIS — M75.82 TENDINITIS OF LEFT ROTATOR CUFF: Primary | ICD-10-CM

## 2025-01-15 DIAGNOSIS — M25.512 ACUTE PAIN OF LEFT SHOULDER: ICD-10-CM

## 2025-01-15 DIAGNOSIS — M75.42 IMPINGEMENT SYNDROME OF LEFT SHOULDER: ICD-10-CM

## 2025-01-15 PROCEDURE — 97140 MANUAL THERAPY 1/> REGIONS: CPT

## 2025-01-15 PROCEDURE — 97110 THERAPEUTIC EXERCISES: CPT

## 2025-01-15 NOTE — PROGRESS NOTES
"Daily Note     Today's date: 1/15/2025  Patient name: Keeley Moore  : 1975  MRN: [id number]  Referring provider: No ref. provider found  Dx:   Encounter Diagnosis     ICD-10-CM    1. Tendinitis of left rotator cuff  M75.82       2. Impingement syndrome of left shoulder  M75.42       3. Acute pain of left shoulder  M25.512       4. Adhesive capsulitis of left shoulder  M75.02           Start Time: 1712  Stop Time: 1740  Total time in clinic (min): 28 minutes      Subjective: Patient reports feeling a little better overall.       Objective: See treatment diary below      Assessment: Tolerated treatment well with continued focus on mobility and gentle AAROM/stretching. Discomfort with IR stretching, resolved with rest. Patient demonstrated fatigue post treatment, exhibited good technique with therapeutic exercises, and would benefit from continued PT.      Plan: Continue per plan of care.      Precautions: High symptom irritability.     HEP Access Code: [email or web address]     POC expires Unit limit Auth Expiration date PT/OT + Visit Limit?   25 BOMN N/A 60 PCY (18 used)                             Visit 1 IE 2 3 4         Manuals 12/26 1/2 1/6 1/15         Shoulder PROM  GJL GJL GJL         Soft Tissue Deformation                                                                 Assessment             Neuro Re-Ed             Scapular retraction  3x10 3x10 3x10         Functional IR with scap retract   With strap 10x3\"  With strap 10x3\"                                                                           Ther Ex             C/s retraction Supine 2x10    Seated 2x10 HEP Seated 3x10 Supine 3x10 Supine 3x10         C/s retraction + ext             T/spine ext chair   30x5\"  30x5\"         Pball roll flexion  3x10 3x10 3x10         Shoulder flexion cane 10x5\" HEP 2x10x5\" 3x10x5\"  3x10x5\"                                                              Ther Activity                                       Gait Training        "                                Modalities               5' pre ALBARO [place]

## 2025-01-21 ENCOUNTER — OFFICE VISIT (OUTPATIENT)
Dept: PHYSICAL THERAPY | Facility: OTHER | Age: 50
End: 2025-01-21
Payer: COMMERCIAL

## 2025-01-21 DIAGNOSIS — M25.512 ACUTE PAIN OF LEFT SHOULDER: ICD-10-CM

## 2025-01-21 DIAGNOSIS — M75.42 IMPINGEMENT SYNDROME OF LEFT SHOULDER: ICD-10-CM

## 2025-01-21 DIAGNOSIS — M75.82 TENDINITIS OF LEFT ROTATOR CUFF: Primary | ICD-10-CM

## 2025-01-21 DIAGNOSIS — M75.02 ADHESIVE CAPSULITIS OF LEFT SHOULDER: ICD-10-CM

## 2025-01-21 PROCEDURE — 97140 MANUAL THERAPY 1/> REGIONS: CPT

## 2025-01-21 PROCEDURE — 97110 THERAPEUTIC EXERCISES: CPT

## 2025-01-21 NOTE — PROGRESS NOTES
"Daily Note     Today's date: 2025  Patient name: Keeley Moore  : 1975  MRN: [id number]  Referring provider: No ref. provider found  Dx:   Encounter Diagnosis     ICD-10-CM    1. Tendinitis of left rotator cuff  M75.82       2. Impingement syndrome of left shoulder  M75.42       3. Acute pain of left shoulder  M25.512       4. Adhesive capsulitis of left shoulder  M75.02           Start Time: 1640  Stop Time: 1710  Total time in clinic (min): 30 minutes  1 on 1 23 minutes all else MH/IEP    Subjective: Patient reports her shoulder has been a little more sore likely due to all the shoveling of snow she did and how cold it has been.       Objective: See treatment diary below    L Shoulder PROM  Flex - 150 deg  ER - 45 deg  Abd - 120 deg    Assessment: Tolerated treatment well with continued focus on mobility and gentle AAROM/stretching. Discomfort with IR stretching, resolved with rest. PROM gradually improving with objective measures noted above. Patient demonstrated fatigue post treatment, exhibited good technique with therapeutic exercises, and would benefit from continued PT.      Plan: Continue per plan of care.      Precautions: High symptom irritability.     HEP Access Code: [email or web address]     POC expires Unit limit Auth Expiration date PT/OT + Visit Limit?   25 BOMN N/A 60 PCY (18 used)                             Visit 1 IE 2 3 4 5        Manuals 12/26 1/2 1/6 1/15 1/21        Shoulder PROM  GJL GJL GJL GJL        GHJ mobs     GJL Gr II-III        Soft Tissue Deformation                                                                 Assessment             Neuro Re-Ed             Scapular retraction  3x10 3x10 3x10 3x10        Functional IR with scap retract   With strap 10x3\"  With strap 10x3\"  held                                                                         Ther Ex             C/s retraction Supine 2x10    Seated 2x10 HEP Seated 3x10 Supine 3x10 Supine 3x10 Supine 3x10        C/s " "retraction + ext             T/spine ext chair   30x5\"  30x5\" 30x5\"        Pball roll flexion  3x10 3x10 3x10         Shoulder flexion cane 10x5\" HEP 2x10x5\" 3x10x5\"  3x10x5\"  3x10x5\"         [place] flexion AAROM     2x10                                                            Ther Activity                                       Gait Training                                       Modalities               5' pre L Shld   5' pre L shld                          "

## 2025-01-28 ENCOUNTER — OFFICE VISIT (OUTPATIENT)
Dept: PHYSICAL THERAPY | Facility: OTHER | Age: 50
End: 2025-01-28
Payer: COMMERCIAL

## 2025-01-28 DIAGNOSIS — M25.512 ACUTE PAIN OF LEFT SHOULDER: ICD-10-CM

## 2025-01-28 DIAGNOSIS — M75.42 IMPINGEMENT SYNDROME OF LEFT SHOULDER: ICD-10-CM

## 2025-01-28 DIAGNOSIS — M75.02 ADHESIVE CAPSULITIS OF LEFT SHOULDER: ICD-10-CM

## 2025-01-28 DIAGNOSIS — M75.82 TENDINITIS OF LEFT ROTATOR CUFF: Primary | ICD-10-CM

## 2025-01-28 PROCEDURE — 97140 MANUAL THERAPY 1/> REGIONS: CPT

## 2025-01-28 PROCEDURE — 97110 THERAPEUTIC EXERCISES: CPT

## 2025-01-28 NOTE — PROGRESS NOTES
"Daily Note     Today's date: 2025  Patient name: Keeley Moore  : 1975  MRN: [id number]  Referring provider: No ref. provider found  Dx:   Encounter Diagnosis     ICD-10-CM    1. Tendinitis of left rotator cuff  M75.82       2. Impingement syndrome of left shoulder  M75.42       3. Acute pain of left shoulder  M25.512       4. Adhesive capsulitis of left shoulder  M75.02           Start Time: 1702  Stop Time: 1728  Total time in clinic (min): 26 minutes  1 on 1 23 minutes all else IEP     Subjective: Patient reports she has been having slight twinges in the shoulder, but nothing like it used to be.       Objective: See treatment diary below    L Shoulder PROM  Flex - 155 deg  ER - 50 deg  Abd - 125 deg      Assessment: Tolerated treatment well with continued focus on shoulder mobility and gentle AAROM/stretching. PROM continues to gradually improve with objective measures noted above with less pain/symptoms than previous visits. Patient demonstrated fatigue post treatment, exhibited good technique with therapeutic exercises, and would benefit from continued PT.      Plan: Continue per plan of care.      Precautions: High symptom irritability.     HEP Access Code: [email or web address]     POC expires Unit limit Auth Expiration date PT/OT + Visit Limit?   25 BOMN N/A 60 PCY (18 used)                             Visit 1 IE 2 3 4 5 6       Manuals 12/26 1/2 1/6 1/15 1/21 1/28       Shoulder PROM  GJL GJL GJL GJL GJL       GHJ mobs     GJL Gr II-III GJL gr II-III       Soft Tissue Deformation                                                                 Assessment             Neuro Re-Ed             Scapular retraction  3x10 3x10 3x10 3x10 3x10       Functional IR with scap retract   With strap 10x3\"  With strap 10x3\"  held With strap 15x3\"                                                                         Ther Ex             C/s retraction Supine 2x10    Seated 2x10 HEP Seated 3x10 Supine 3x10 Supine 3x10 " "Supine 3x10 Supine 3x10       C/s retraction + ext             T/spine ext chair   30x5\"  30x5\" 30x5\" 30x5\"        Pball roll flexion  3x10 3x10 3x10  3x10       Shoulder flexion cane 10x5\" HEP 2x10x5\" 3x10x5\"  3x10x5\"  3x10x5\"  3x10x5\"        [place] flexion AAROM     2x10 2x10                                                           Ther Activity                                       Gait Training                                       Modalities               5' pre L Shld   5' pre L shld                          "

## 2025-02-03 ENCOUNTER — OFFICE VISIT (OUTPATIENT)
Dept: PHYSICAL THERAPY | Facility: OTHER | Age: 50
End: 2025-02-03
Payer: COMMERCIAL

## 2025-02-03 DIAGNOSIS — M75.02 ADHESIVE CAPSULITIS OF LEFT SHOULDER: ICD-10-CM

## 2025-02-03 DIAGNOSIS — M25.512 ACUTE PAIN OF LEFT SHOULDER: ICD-10-CM

## 2025-02-03 DIAGNOSIS — M75.42 IMPINGEMENT SYNDROME OF LEFT SHOULDER: ICD-10-CM

## 2025-02-03 DIAGNOSIS — M75.82 TENDINITIS OF LEFT ROTATOR CUFF: Primary | ICD-10-CM

## 2025-02-03 PROCEDURE — 97140 MANUAL THERAPY 1/> REGIONS: CPT

## 2025-02-03 PROCEDURE — 97110 THERAPEUTIC EXERCISES: CPT

## 2025-02-03 NOTE — PROGRESS NOTES
"Daily Note     Today's date: 2/3/2025  Patient name: Keeley Moore  : 1975  MRN: [id number]  Referring provider: No ref. provider found  Dx:   Encounter Diagnosis     ICD-10-CM    1. Tendinitis of left rotator cuff  M75.82       2. Impingement syndrome of left shoulder  M75.42       3. Acute pain of left shoulder  M25.512       4. Adhesive capsulitis of left shoulder  M75.02           Start Time: 1637  Stop Time: 1705  Total time in clinic (min): 28 minutes  1 on 1 23 minutes all else IEP     Subjective: Patient offers no new pain or soreness following last visit. Patient reports pain is much less than before, and does not linger for as long when it is painful.     Objective: See treatment diary below    Assessment: Tolerated treatment well with continued focus on shoulder mobility and gentle AAROM/stretching. Mild discomfort end range flexion PROM resolved immediately with rest. Patient demonstrated fatigue post treatment, exhibited good technique with therapeutic exercises, and would benefit from continued PT.    Plan: Continue per plan of care.      Precautions: High symptom irritability.     HEP Access Code: [email or web address]     POC expires Unit limit Auth Expiration date PT/OT + Visit Limit?   25 BOMN N/A 60 PCY (18 used)                             Visit 1 IE 2 3 4 5 6 7      Manuals 12/26 1/2 1/6 1/15 1/21 1/28 2/3      Shoulder PROM  GJL GJL GJL GJL GJL GJL      GHJ mobs     GJL Gr II-III GJL gr II-III GJL gr II-III      Soft Tissue Deformation                                                                 Assessment             Neuro Re-Ed             Scapular retraction  3x10 3x10 3x10 3x10 3x10 3x10      Functional IR with scap retract   With strap 10x3\"  With strap 10x3\"  held With strap 15x3\"  With strap 5x3\"                                                                       Ther Ex             C/s retraction Supine 2x10    Seated 2x10 HEP Seated 3x10 Supine 3x10 Supine 3x10 Supine 3x10 Supine " "3x10 Supine 3x10      C/s retraction + ext             T/spine ext chair   30x5\"  30x5\" 30x5\" 30x5\"  30x5\"      Pball roll flexion  3x10 3x10 3x10  3x10 4x10      Shoulder flexion cane 10x5\" HEP 2x10x5\" 3x10x5\"  3x10x5\"  3x10x5\"  3x10x5\"  3x10x5\"      [place] flexion AAROM     2x10 2x10 3x10                                                          Ther Activity                                       Gait Training                                       Modalities               5' pre L Shld   5' pre L shld                          "

## 2025-02-04 ENCOUNTER — APPOINTMENT (OUTPATIENT)
Dept: PHYSICAL THERAPY | Facility: OTHER | Age: 50
End: 2025-02-04
Payer: COMMERCIAL

## 2025-02-11 ENCOUNTER — OFFICE VISIT (OUTPATIENT)
Dept: PHYSICAL THERAPY | Facility: OTHER | Age: 50
End: 2025-02-11
Payer: COMMERCIAL

## 2025-02-11 DIAGNOSIS — M75.42 IMPINGEMENT SYNDROME OF LEFT SHOULDER: ICD-10-CM

## 2025-02-11 DIAGNOSIS — M75.02 ADHESIVE CAPSULITIS OF LEFT SHOULDER: ICD-10-CM

## 2025-02-11 DIAGNOSIS — M25.512 ACUTE PAIN OF LEFT SHOULDER: ICD-10-CM

## 2025-02-11 DIAGNOSIS — M75.82 TENDINITIS OF LEFT ROTATOR CUFF: Primary | ICD-10-CM

## 2025-02-11 PROCEDURE — 97140 MANUAL THERAPY 1/> REGIONS: CPT

## 2025-02-11 PROCEDURE — 97110 THERAPEUTIC EXERCISES: CPT

## 2025-02-11 NOTE — PROGRESS NOTES
"Daily Note     Today's date: 2025  Patient name: Keeley Moore  : 1975  MRN: [id number]  Referring provider: No ref. provider found  Dx:   Encounter Diagnosis     ICD-10-CM    1. Tendinitis of left rotator cuff  M75.82       2. Impingement syndrome of left shoulder  M75.42       3. Acute pain of left shoulder  M25.512       4. Adhesive capsulitis of left shoulder  M75.02           Start Time: 1638  Stop Time: 1710  Total time in clinic (min): 32 minutes  1 on 1 23 minutes all else IEP     Subjective: Patient reports her right wrist is bothersome after shoveling ice over the weekend, but does feel her left shoulder is doing much better - closer to 75% improved overall. Patient feels ready to continue with independent HEP at this time and will follow-up in the next 2-3 weeks as needed.    Objective: See treatment diary below    Assessment: Tolerated treatment well with continued focus on shoulder mobility and gentle AAROM/stretching. Added in wall slide with minor discomfort which resolved with rest. Patient tentatively discharged at this time - will keep case open 2-3 weeks for follow-up as needed. Educated patient on continued HEP to maintain current progress.     Plan: Continue per plan of care.  Tentative discharge to HEP.     Precautions: High symptom irritability.     HEP Access Code: [email or web address]     POC expires Unit limit Auth Expiration date PT/OT + Visit Limit?   25 BOMN N/A 60 PCY (18 used)                             Visit 1 IE 2 3 4 5 6 7 8     Manuals 12/26 1/2 1/6 1/15 1/21 1/28 2/3 2/11     Shoulder PROM  GJL GJL GJL GJL GJL GJL GJL     GHJ mobs     GJL Gr II-III GJL gr II-III GJL gr II-III GJL gr II-III     Soft Tissue Deformation                                                                 Assessment             Neuro Re-Ed             Scapular retraction  3x10 3x10 3x10 3x10 3x10 3x10 3x10     Functional IR with scap retract   With strap 10x3\"  With strap 10x3\"  held With strap 15x3\" " " With strap 5x3\" With strap 5x3\"                                                                      Ther Ex             C/s retraction Supine 2x10    Seated 2x10 HEP Seated 3x10 Supine 3x10 Supine 3x10 Supine 3x10 Supine 3x10 Supine 3x10 Supine 3x10     C/s retraction + ext             T/spine ext chair   30x5\"  30x5\" 30x5\" 30x5\"  30x5\" 30x5\"     Pball roll flexion  3x10 3x10 3x10  3x10 4x10 4x10     Shoulder flexion cane 10x5\" HEP 2x10x5\" 3x10x5\"  3x10x5\"  3x10x5\"  3x10x5\"  3x10x5\" 3x10x5\"      [place] flexion AAROM     2x10 2x10 3x10 3x10     Wall slide        2x8                                            Ther Activity                                       Gait Training                                       Modalities               5' pre L Shld   5' pre L shld                          "

## 2025-02-25 ENCOUNTER — OFFICE VISIT (OUTPATIENT)
Dept: FAMILY MEDICINE CLINIC | Facility: CLINIC | Age: 50
End: 2025-02-25
Payer: COMMERCIAL

## 2025-02-25 VITALS
HEART RATE: 82 BPM | BODY MASS INDEX: 41.46 KG/M2 | HEIGHT: 68 IN | SYSTOLIC BLOOD PRESSURE: 108 MMHG | RESPIRATION RATE: 16 BRPM | OXYGEN SATURATION: 99 % | TEMPERATURE: 97.6 F | WEIGHT: 273.6 LBS | DIASTOLIC BLOOD PRESSURE: 70 MMHG

## 2025-02-25 DIAGNOSIS — E11.65 TYPE 2 DIABETES MELLITUS WITH HYPERGLYCEMIA, WITHOUT LONG-TERM CURRENT USE OF INSULIN (HCC): ICD-10-CM

## 2025-02-25 DIAGNOSIS — I10 MILD ESSENTIAL HYPERTENSION: Primary | ICD-10-CM

## 2025-02-25 DIAGNOSIS — M25.519 SHOULDER PAIN, UNSPECIFIED CHRONICITY, UNSPECIFIED LATERALITY: ICD-10-CM

## 2025-02-25 DIAGNOSIS — E66.01 CLASS 3 SEVERE OBESITY DUE TO EXCESS CALORIES WITH SERIOUS COMORBIDITY AND BODY MASS INDEX (BMI) OF 40.0 TO 44.9 IN ADULT (HCC): ICD-10-CM

## 2025-02-25 DIAGNOSIS — E55.9 VITAMIN D DEFICIENCY: ICD-10-CM

## 2025-02-25 DIAGNOSIS — E66.813 CLASS 3 SEVERE OBESITY DUE TO EXCESS CALORIES WITH SERIOUS COMORBIDITY AND BODY MASS INDEX (BMI) OF 40.0 TO 44.9 IN ADULT (HCC): ICD-10-CM

## 2025-02-25 DIAGNOSIS — E78.2 MIXED HYPERLIPIDEMIA: ICD-10-CM

## 2025-02-25 PROCEDURE — 99214 OFFICE O/P EST MOD 30 MIN: CPT | Performed by: FAMILY MEDICINE

## 2025-02-28 NOTE — ASSESSMENT & PLAN NOTE
Diabetes mellitus  - A1c improved to 6.3  - Endocrinologist appointment on 03/03/2025  - Experienced hypoglycemic episodes with levels between 50 and 60- she notes that she talked w/ endocrinology about possibly decreasing insulin  - Advised to monitor blood sugar closely and keep fast-acting glucose on hand if needed  Lab Results   Component Value Date    HGBA1C 6.3 (H) 01/11/2025       Orders:  •  Albumin / creatinine urine ratio; Future

## 2025-02-28 NOTE — ASSESSMENT & PLAN NOTE
Weight management  - Currently on Mounjaro  - Gradually losing weight but not as desired  - Encouraged to continue regimen and discuss adjustments with endocrinologist

## 2025-02-28 NOTE — PROGRESS NOTES
Assessment & Plan  Mild essential hypertension    Orders:  •  CBC and differential; Future  •  Comprehensive metabolic panel; Future    Type 2 diabetes mellitus with hyperglycemia, without long-term current use of insulin ([facility])  Diabetes mellitus  - A1c improved to 6.3  - Endocrinologist appointment on 03/03/2025  - Experienced hypoglycemic episodes with levels between 50 and 60- she notes that she talked w/ endocrinology about possibly decreasing insulin  - Advised to monitor blood sugar closely and keep fast-acting glucose on hand if needed  Lab Results   Component Value Date    HGBA1C 6.3 (H) 01/11/2025       Orders:  •  Albumin / creatinine urine ratio; Future    Mixed hyperlipidemia  Check cmp and lipids prior to next OV  On atorvastatin 10 mg daily  May benefit from increasing statin dose given hx DM    Orders:  •  Comprehensive metabolic panel; Future  •  Lipid Panel with Direct LDL reflex; Future    Class 3 severe obesity due to excess calories with serious comorbidity and body mass index (BMI) of 40.0 to 44.9 in adult ([facility])  Weight management  - Currently on Mounjaro  - Gradually losing weight but not as desired  - Encouraged to continue regimen and discuss adjustments with endocrinologist       Vitamin D deficiency  Health maintenance  - On vitamin D supplementation for several months  - Comprehensive labs ordered, including metabolic panel, cholesterol panel, blood count, urine protein, and vitamin D levels  - Advised to complete labs before next appointment for review  Orders:  •  Vitamin D 25 hydroxy; Future    Shoulder pain, unspecified chronicity, unspecified laterality  Rotator cuff issues  - Ongoing discomfort began while crocheting  - Initial therapy was ineffective  - Subsequent therapy at [facility] with stretching exercises provided some relief  - Discomfort persists, especially when sleeping on the affected side  - Seeing specialist                Most recent labs reviewed      Subjective:     Keeley is a 49 y.o. female here today and has the below chronic conditions:    Patient Active Problem List   Diagnosis   • Type 2 diabetes mellitus with hyperglycemia, without long-term current use of insulin ([facility])   • Hyperlipidemia   • Mild essential hypertension   • Right wrist tendonitis   • Class 3 severe obesity due to excess calories with serious comorbidity and body mass index (BMI) of 40.0 to 44.9 in adult ([facility])   • Primary osteoarthritis of left knee   • Migraines     Current Outpatient Medications   Medication Sig Dispense Refill   • atorvastatin (LIPITOR) 10 mg tablet TAKE 1 TABLET DAILY 90 tablet 1   • Continuous Blood Gluc  (FreeStyle Shelly [place]) BLANCHE Use 1 Units continuous 1 each 0   • Continuous Glucose Sensor (FreeStyle Shelly 3 Plus Sensor) MISC Use 1 each every 15 days 6 each 1   • ergocalciferol (VITAMIN D2) 50,000 units Take 1 capsule (50,000 Units total) by mouth once a week 12 capsule 1   • insulin glargine (Toujeo SoloStar) 300 units/mL CONCENTRATED U-300 injection pen (1-unit dial) Inject 15 Units under the skin daily at bedtime 9 mL 0   • Insulin Pen Needle (BD Pen Needle Sindhu 2nd Gen) 32G X 4 MM MISC USE ONE PEN NEEDLE ONCE DAILY 100 each 6   • lisinopril (ZESTRIL) 10 mg tablet TAKE 1 TABLET DAILY FOR BLOOD PRESSURE 90 tablet 1   • meclizine (ANTIVERT) 12.5 MG tablet Take by mouth if needed for dizziness     • metFORMIN (GLUCOPHAGE) 1000 MG tablet Take 1 tablet (1,000 mg total) by mouth 2 (two) times a day with meals 180 tablet 1   • naproxen (NAPROSYN) 500 mg tablet Take 1 tablet (500 mg total) by mouth daily as needed for moderate pain (not with meloxicam)     • tirzepatide (Mounjaro) 15 MG/0.5ML INJECT 0.5 ML (15 MG TOTAL) UNDER THE SKIN EVERY 7 DAYSStrength: 15 MG/0.5ML 6 mL 1   • ZOLMitriptan (ZOMIG-ZMT) 5 MG disintegrating tablet Take 1 tablet (5 mg total) by mouth once as needed for migraine 9 tablet 3     No current facility-administered medications for  this visit.          Chief Complaint   Patient presents with   • Follow-up     6 mo f/u. No new problems or concerns.      HPI:  - CC above per clinical staff and reviewed.    History of Present Illness  The patient presents for evaluation of rotator cuff issues, diabetes, weight management, and health maintenance.    Rotator Cuff Issues  - Reports issues from crocheting.  - Initial physical therapy on [facility] was ineffective.  - Primary care physician identified scar tissue despite no shoulder injury history.  - Referred to [facility] for physical therapy, graduated but continues home exercises due to slow improvement.  - Therapy included stretching for back, neck, and shoulder.  - Difficulty sleeping on the affected side but finds it bearable in a comfortable position.    Diabetes  - A1c levels have decreased, with the lowest at 6.2.  - Endocrinologist appointment on 03/03/2025 to discuss reducing insulin dosage but is hesitant due to successful management.  - Uses a continuous glucose monitor, alerted to high blood sugar levels five times.  - Experienced hypoglycemic episodes with levels dropping below 60 but not below 50, managed with chocolate.  - Reports normal bowel movements, no chest pain, or respiratory distress.  - Mindful of carbohydrate intake.    Weight Management  - Currently on Mounjaro, gradually losing weight but not at the desired rate.  - Tried bee patches for weight loss, found ineffective.    Health Maintenance  - Taking vitamin D supplements for several months, no additional labs ordered.  - Recent mammogram revealed a benign finding.  - Genetic testing for BRCA1 and BRCA2 was negative.        The following portions of the patient's history were reviewed and updated as appropriate: allergies, current medications, past family history, past medical history, past social history, past surgical history and problem list.    ROS:  Review of Systems   As noted above.    Objective:      /70  "  Pulse 82   Temp 97.6 °F (36.4 °C) (Temporal)   Resp 16   Ht 5' 8\" (1.727 m)   Wt 124 kg (273 lb 9.6 oz)   SpO2 99%   BMI 41.60 kg/m²   BP Readings from Last 3 Encounters:   02/25/25 108/70   12/16/24 106/73   11/21/24 119/82     Wt Readings from Last 3 Encounters:   02/25/25 124 kg (273 lb 9.6 oz)   12/16/24 127 kg (279 lb)   11/21/24 125 kg (275 lb)               Physical Exam:   Physical Exam  Vitals and nursing note reviewed.   Constitutional:       General: She is not in acute distress.     Appearance: Normal appearance. She is well-developed. She is not ill-appearing.   HENT:      Head: Normocephalic and atraumatic.   Eyes:      Conjunctiva/sclera: Conjunctivae normal.   Neck:      Vascular: No carotid bruit.   Cardiovascular:      Rate and Rhythm: Normal rate and regular rhythm.      Heart sounds: Normal heart sounds. No murmur heard.  Pulmonary:      Effort: Pulmonary effort is normal. No respiratory distress.      Breath sounds: Normal breath sounds. No wheezing.   Abdominal:      Palpations: Abdomen is soft.      Tenderness: There is no abdominal tenderness. There is no guarding or rebound.   Musculoskeletal:      Cervical back: Neck supple.      Right lower leg: No edema.      Left lower leg: No edema.   Lymphadenopathy:      Cervical: No cervical adenopathy.   Skin:     General: Skin is warm and dry.   Neurological:      Mental Status: She is alert and oriented to person, place, and time.   Psychiatric:         Mood and Affect: Mood normal.         Behavior: Behavior normal.                  This office visit note was generated in part with the use of [employer] and/or voice recognition dictation.   "

## 2025-02-28 NOTE — ASSESSMENT & PLAN NOTE
Check cmp and lipids prior to next OV  On atorvastatin 10 mg daily  May benefit from increasing statin dose given hx DM    Orders:  •  Comprehensive metabolic panel; Future  •  Lipid Panel with Direct LDL reflex; Future

## 2025-03-01 ENCOUNTER — APPOINTMENT (OUTPATIENT)
Dept: LAB | Facility: CLINIC | Age: 50
End: 2025-03-01
Payer: COMMERCIAL

## 2025-03-01 DIAGNOSIS — E55.9 VITAMIN D DEFICIENCY: ICD-10-CM

## 2025-03-01 DIAGNOSIS — E11.65 TYPE 2 DIABETES MELLITUS WITH HYPERGLYCEMIA, WITHOUT LONG-TERM CURRENT USE OF INSULIN (HCC): ICD-10-CM

## 2025-03-01 DIAGNOSIS — I10 MILD ESSENTIAL HYPERTENSION: ICD-10-CM

## 2025-03-01 DIAGNOSIS — E78.2 MIXED HYPERLIPIDEMIA: ICD-10-CM

## 2025-03-01 LAB
25(OH)D3 SERPL-MCNC: 32.2 NG/ML (ref 30–100)
ALBUMIN SERPL BCG-MCNC: 3.8 G/DL (ref 3.5–5)
ALP SERPL-CCNC: 64 U/L (ref 34–104)
ALT SERPL W P-5'-P-CCNC: 12 U/L (ref 7–52)
ANION GAP SERPL CALCULATED.3IONS-SCNC: 7 MMOL/L (ref 4–13)
AST SERPL W P-5'-P-CCNC: 9 U/L (ref 13–39)
BASOPHILS # BLD AUTO: 0.08 THOUSANDS/ÂΜL (ref 0–0.1)
BASOPHILS NFR BLD AUTO: 1 % (ref 0–1)
BILIRUB SERPL-MCNC: 0.56 MG/DL (ref 0.2–1)
BUN SERPL-MCNC: 13 MG/DL (ref 5–25)
CALCIUM SERPL-MCNC: 8.8 MG/DL (ref 8.4–10.2)
CHLORIDE SERPL-SCNC: 106 MMOL/L (ref 96–108)
CHOLEST SERPL-MCNC: 100 MG/DL (ref ?–200)
CO2 SERPL-SCNC: 22 MMOL/L (ref 21–32)
CREAT SERPL-MCNC: 0.77 MG/DL (ref 0.6–1.3)
CREAT UR-MCNC: 187.5 MG/DL
EOSINOPHIL # BLD AUTO: 0.14 THOUSAND/ÂΜL (ref 0–0.61)
EOSINOPHIL NFR BLD AUTO: 1 % (ref 0–6)
ERYTHROCYTE [DISTWIDTH] IN BLOOD BY AUTOMATED COUNT: 14.6 % (ref 11.6–15.1)
GFR SERPL CREATININE-BSD FRML MDRD: 90 ML/MIN/1.73SQ M
GLUCOSE P FAST SERPL-MCNC: 162 MG/DL (ref 65–99)
HCT VFR BLD AUTO: 42.7 % (ref 34.8–46.1)
HDLC SERPL-MCNC: 34 MG/DL
HGB BLD-MCNC: 13.9 G/DL (ref 11.5–15.4)
IMM GRANULOCYTES # BLD AUTO: 0.05 THOUSAND/UL (ref 0–0.2)
IMM GRANULOCYTES NFR BLD AUTO: 1 % (ref 0–2)
LDLC SERPL CALC-MCNC: 45 MG/DL (ref 0–100)
LYMPHOCYTES # BLD AUTO: 4.17 THOUSANDS/ÂΜL (ref 0.6–4.47)
LYMPHOCYTES NFR BLD AUTO: 38 % (ref 14–44)
MCH RBC QN AUTO: 28.7 PG (ref 26.8–34.3)
MCHC RBC AUTO-ENTMCNC: 32.6 G/DL (ref 31.4–37.4)
MCV RBC AUTO: 88 FL (ref 82–98)
MICROALBUMIN UR-MCNC: 10.1 MG/L
MICROALBUMIN/CREAT 24H UR: 5 MG/G CREATININE (ref 0–30)
MONOCYTES # BLD AUTO: 0.81 THOUSAND/ÂΜL (ref 0.17–1.22)
MONOCYTES NFR BLD AUTO: 7 % (ref 4–12)
NEUTROPHILS # BLD AUTO: 5.75 THOUSANDS/ÂΜL (ref 1.85–7.62)
NEUTS SEG NFR BLD AUTO: 52 % (ref 43–75)
NRBC BLD AUTO-RTO: 0 /100 WBCS
PLATELET # BLD AUTO: 314 THOUSANDS/UL (ref 149–390)
PMV BLD AUTO: 11.5 FL (ref 8.9–12.7)
POTASSIUM SERPL-SCNC: 4.2 MMOL/L (ref 3.5–5.3)
PROT SERPL-MCNC: 6.6 G/DL (ref 6.4–8.4)
RBC # BLD AUTO: 4.84 MILLION/UL (ref 3.81–5.12)
SODIUM SERPL-SCNC: 135 MMOL/L (ref 135–147)
TRIGL SERPL-MCNC: 106 MG/DL (ref ?–150)
WBC # BLD AUTO: 11 THOUSAND/UL (ref 4.31–10.16)

## 2025-03-01 PROCEDURE — 82570 ASSAY OF URINE CREATININE: CPT

## 2025-03-01 PROCEDURE — 80053 COMPREHEN METABOLIC PANEL: CPT

## 2025-03-01 PROCEDURE — 36415 COLL VENOUS BLD VENIPUNCTURE: CPT

## 2025-03-01 PROCEDURE — 82306 VITAMIN D 25 HYDROXY: CPT

## 2025-03-01 PROCEDURE — 80061 LIPID PANEL: CPT

## 2025-03-01 PROCEDURE — 85025 COMPLETE CBC W/AUTO DIFF WBC: CPT

## 2025-03-01 PROCEDURE — 82043 UR ALBUMIN QUANTITATIVE: CPT

## 2025-03-03 ENCOUNTER — OFFICE VISIT (OUTPATIENT)
Dept: ENDOCRINOLOGY | Facility: CLINIC | Age: 50
End: 2025-03-03
Payer: COMMERCIAL

## 2025-03-03 VITALS
WEIGHT: 277 LBS | OXYGEN SATURATION: 96 % | TEMPERATURE: 97.2 F | BODY MASS INDEX: 41.98 KG/M2 | HEIGHT: 68 IN | DIASTOLIC BLOOD PRESSURE: 88 MMHG | RESPIRATION RATE: 18 BRPM | SYSTOLIC BLOOD PRESSURE: 132 MMHG | HEART RATE: 72 BPM

## 2025-03-03 DIAGNOSIS — E11.65 TYPE 2 DIABETES MELLITUS WITH HYPERGLYCEMIA, WITHOUT LONG-TERM CURRENT USE OF INSULIN (HCC): Primary | ICD-10-CM

## 2025-03-03 DIAGNOSIS — E78.2 MIXED HYPERLIPIDEMIA: ICD-10-CM

## 2025-03-03 DIAGNOSIS — E66.01 CLASS 3 SEVERE OBESITY DUE TO EXCESS CALORIES WITH SERIOUS COMORBIDITY AND BODY MASS INDEX (BMI) OF 40.0 TO 44.9 IN ADULT (HCC): ICD-10-CM

## 2025-03-03 DIAGNOSIS — E11.65 TYPE 2 DIABETES MELLITUS WITH HYPERGLYCEMIA, WITHOUT LONG-TERM CURRENT USE OF INSULIN (HCC): ICD-10-CM

## 2025-03-03 DIAGNOSIS — E66.813 CLASS 3 SEVERE OBESITY DUE TO EXCESS CALORIES WITH SERIOUS COMORBIDITY AND BODY MASS INDEX (BMI) OF 40.0 TO 44.9 IN ADULT (HCC): ICD-10-CM

## 2025-03-03 PROCEDURE — 95251 CONT GLUC MNTR ANALYSIS I&R: CPT | Performed by: INTERNAL MEDICINE

## 2025-03-03 PROCEDURE — 99214 OFFICE O/P EST MOD 30 MIN: CPT | Performed by: INTERNAL MEDICINE

## 2025-03-03 NOTE — ASSESSMENT & PLAN NOTE
She is improved with GMI 6.3% and TIR 94%. She has the same weight..    Today we discussed options and agreed to continue metformin 1000mg po bid, decrease Toujeo 10u qhs and continue Mounjaro 15mg weekly.  Reiterated diet and lifestyle changes.    Share castro data in 6-8 weeks.  Follow up in 6 months.    Lab Results   Component Value Date    HGBA1C 6.3 (H) 01/11/2025

## 2025-03-03 NOTE — PROGRESS NOTES
"    Follow-up Patient Progress Note      CC: Type 2 diabetes, morbid obesity     History of Present Illness:   48-year-old female with type 2 diabetes, HTN, HLD, vitamin D deficiency, morbid obesity BMI 43, DJD, Hirsutism, chronic migraines, suspected ALVARADO and Rt ankle bone spur.  Last visit was 1/24/24.     Since last visit, she is same weight.     Menstrual history : LMP was 4/21/23.  Appears irregular.  Never had pregnancy.     CGM data review::  Device: Shelly 3  Dates: 2/13/25 - 2/26/25            Usage: 98 %   Av glu: 125 mg/dL SD:  mg/dL            CV: 21.5  %        GMI: 6.3% TIR: 94 %        TAR: 4 %   TBR: 0  %     Glycemic patters: excellent control. Rare pp hyperglycemia.  Hypoglycemia: No     Current meds: Reported side effects with Jardiance.  Mounjaro 15mg weekly - started 3/12/24  Metformin 1000 mg p.o. twice daily  Toujeo 25 units nightly     Opthamology: Yes  Podiatry: 10/22  vaccination:   Dental:  Pancreatitis: no     Ace/ARB: Lisinopril 10 mg daily  Statin: Lipitor 10 mg nightly  Thyroid issues: No     Physical Exam:  Body mass index is 42.12 kg/m².  /88 (BP Location: Left arm, Patient Position: Sitting)   Pulse 72   Temp (!) 97.2 °F (36.2 °C) (Tympanic)   Resp 18   Ht 5' 8\" (1.727 m)   Wt 126 kg (277 lb)   SpO2 96%   BMI 42.12 kg/m²    Vitals:    03/03/25 0948   Weight: 126 kg (277 lb)        Physical Exam  Constitutional:       General: She is not in acute distress.     Appearance: She is well-developed.   HENT:      Head: Normocephalic and atraumatic.      Nose: Nose normal.   Eyes:      Conjunctiva/sclera: Conjunctivae normal.   Pulmonary:      Effort: Pulmonary effort is normal.   Abdominal:      General: There is no distension.   Musculoskeletal:      Cervical back: Normal range of motion and neck supple.   Skin:     Findings: No rash.      Comments: No icterus   Neurological:      Mental Status: She is alert and oriented to person, place, and time.         Labs:   Lab Results "   Component Value Date    HGBA1C 6.3 (H) 01/11/2025       Lab Results   Component Value Date    KOZ6RFMWWMMH 2.453 06/03/2023    TSH 2.14 01/18/2021       Lab Results   Component Value Date    CREATININE 0.77 03/01/2025    CREATININE 0.67 07/20/2024    CREATININE 0.56 (L) 02/01/2024    BUN 13 03/01/2025    K 4.2 03/01/2025     03/01/2025    CO2 22 03/01/2025     GFR, Calculated   Date Value Ref Range Status   05/23/2019 106 >60 mL/min/1.73m2 Final     Comment:     mL/min per 1.73 square meters                                            Normal Function or Mild Renal    Disease (if clinically at risk):  >or=60  Moderately Decreased:                30-59  Severely Decreased:                  15-29  Renal Failure:                         <15                                            -American GFR: multiply reported GFR by 1.16    Please note that the eGFR is based on the CKD-EPI calculation, and is not intended to be used for drug dosing.                                            Note: Calculated GFR may not be an accurate indicator of renal function if the patient's renal function is not in a steady state.     eGFRcr   Date Value Ref Range Status   03/17/2023 102 >59 Final     eGFR   Date Value Ref Range Status   03/01/2025 90 ml/min/1.73sq m Final       Lab Results   Component Value Date    ALT 12 03/01/2025    AST 9 (L) 03/01/2025    ALKPHOS 64 03/01/2025       Lab Results   Component Value Date    CHOLESTEROL 100 03/01/2025    CHOLESTEROL 104 07/20/2024    CHOLESTEROL 107 02/01/2024     Lab Results   Component Value Date    HDL 34 (L) 03/01/2025    HDL 33 (L) 07/20/2024    HDL 37 (L) 02/01/2024     Lab Results   Component Value Date    TRIG 106 03/01/2025    TRIG 108 07/20/2024    TRIG 103 02/01/2024     Lab Results   Component Value Date    NONHDLC 76 10/26/2022         Assessment/Plan:    1. Type 2 diabetes mellitus with hyperglycemia, without long-term current use of insulin (HCC)  Assessment &  Plan:  She is improved with GMI 6.3% and TIR 94%. She has the same weight..    Today we discussed options and agreed to continue metformin 1000mg po bid, decrease Toujeo 10u qhs and continue Mounjaro 15mg weekly.  Reiterated diet and lifestyle changes.    Share castro data in 6-8 weeks.  Follow up in 6 months.    Lab Results   Component Value Date    HGBA1C 6.3 (H) 01/11/2025     Orders:  -     Hemoglobin A1C; Future  -     Albumin / creatinine urine ratio; Future  -     Ambulatory Referral to Medical Fitness Exercise Specialist; Future  2. Class 3 severe obesity due to excess calories with serious comorbidity and body mass index (BMI) of 40.0 to 44.9 in adult ([facility])  Assessment & Plan:  She is stable. She did not lose weight in spite of dose escalation of mounjaro to maximum.  Encouraged medical fitness training - referral placed.    We may consider adding Qysimia or contrave if covered in future.  Goal is weight of 220 lbs over next 6-8 months.  Orders:  -     Ambulatory Referral to Medical Fitness Exercise Specialist; Future  3. Mixed hyperlipidemia  Assessment & Plan:  Continue lipitor 10mg qhs.        I have spent a total time of  minutes on 03/03/25 in caring for this patient including greater than 50% of this time was spent in counseling/coordination of care as listed above.       Discussed with the patient and all questioned fully answered. She will contact me with concerns.    Perry Trinh

## 2025-03-03 NOTE — ASSESSMENT & PLAN NOTE
She is stable. She did not lose weight in spite of dose escalation of mounjaro to maximum.  Encouraged medical fitness training - referral placed.    We may consider adding Qysimia or contrave if covered in future.  Goal is weight of 220 lbs over next 6-8 months.

## 2025-03-10 DIAGNOSIS — E11.65 TYPE 2 DIABETES MELLITUS WITH HYPERGLYCEMIA, WITHOUT LONG-TERM CURRENT USE OF INSULIN (HCC): ICD-10-CM

## 2025-03-11 RX ORDER — INSULIN GLARGINE 300 U/ML
15 INJECTION, SOLUTION SUBCUTANEOUS
Qty: 4.5 ML | Refills: 3 | Status: SHIPPED | OUTPATIENT
Start: 2025-03-11

## 2025-03-20 ENCOUNTER — OFFICE VISIT (OUTPATIENT)
Dept: FAMILY MEDICINE CLINIC | Facility: CLINIC | Age: 50
End: 2025-03-20
Payer: COMMERCIAL

## 2025-03-20 ENCOUNTER — HOSPITAL ENCOUNTER (EMERGENCY)
Facility: HOSPITAL | Age: 50
Discharge: HOME | End: 2025-03-20
Attending: EMERGENCY MEDICINE
Payer: COMMERCIAL

## 2025-03-20 ENCOUNTER — APPOINTMENT (EMERGENCY)
Dept: CT IMAGING | Facility: HOSPITAL | Age: 50
End: 2025-03-20
Payer: COMMERCIAL

## 2025-03-20 VITALS
DIASTOLIC BLOOD PRESSURE: 70 MMHG | OXYGEN SATURATION: 98 % | BODY MASS INDEX: 41.07 KG/M2 | RESPIRATION RATE: 16 BRPM | TEMPERATURE: 96.9 F | HEIGHT: 68 IN | WEIGHT: 271 LBS | HEART RATE: 85 BPM | SYSTOLIC BLOOD PRESSURE: 100 MMHG

## 2025-03-20 VITALS
OXYGEN SATURATION: 99 % | DIASTOLIC BLOOD PRESSURE: 58 MMHG | SYSTOLIC BLOOD PRESSURE: 113 MMHG | TEMPERATURE: 97.5 F | RESPIRATION RATE: 20 BRPM | HEART RATE: 74 BPM

## 2025-03-20 DIAGNOSIS — R10.31 RIGHT LOWER QUADRANT ABDOMINAL PAIN: Primary | ICD-10-CM

## 2025-03-20 DIAGNOSIS — K59.00 CONSTIPATION, UNSPECIFIED CONSTIPATION TYPE: ICD-10-CM

## 2025-03-20 DIAGNOSIS — R10.11 RIGHT UPPER QUADRANT ABDOMINAL PAIN: Primary | ICD-10-CM

## 2025-03-20 LAB
ALBUMIN SERPL BCG-MCNC: 4.1 G/DL (ref 3.5–5)
ALP SERPL-CCNC: 63 U/L (ref 34–104)
ALT SERPL W P-5'-P-CCNC: 17 U/L (ref 7–52)
ANION GAP SERPL CALCULATED.3IONS-SCNC: 8 MMOL/L (ref 4–13)
AST SERPL W P-5'-P-CCNC: 13 U/L (ref 13–39)
BASOPHILS # BLD MANUAL: 0.12 THOUSAND/UL (ref 0–0.1)
BASOPHILS NFR MAR MANUAL: 1 % (ref 0–1)
BILIRUB SERPL-MCNC: 0.61 MG/DL (ref 0.2–1)
BILIRUB UR QL STRIP: NEGATIVE
BUN SERPL-MCNC: 12 MG/DL (ref 5–25)
CALCIUM SERPL-MCNC: 9.3 MG/DL (ref 8.4–10.2)
CHLORIDE SERPL-SCNC: 106 MMOL/L (ref 96–108)
CLARITY UR: CLEAR
CO2 SERPL-SCNC: 22 MMOL/L (ref 21–32)
COLOR UR: COLORLESS
CREAT SERPL-MCNC: 0.84 MG/DL (ref 0.6–1.3)
EOSINOPHIL # BLD MANUAL: 0.12 THOUSAND/UL (ref 0–0.4)
EOSINOPHIL NFR BLD MANUAL: 1 % (ref 0–6)
ERYTHROCYTE [DISTWIDTH] IN BLOOD BY AUTOMATED COUNT: 14.4 % (ref 11.6–15.1)
GFR SERPL CREATININE-BSD FRML MDRD: 81 ML/MIN/1.73SQ M
GLUCOSE SERPL-MCNC: 117 MG/DL (ref 65–140)
GLUCOSE UR STRIP-MCNC: NEGATIVE MG/DL
HCT VFR BLD AUTO: 41.9 % (ref 34.8–46.1)
HGB BLD-MCNC: 13.7 G/DL (ref 11.5–15.4)
HGB UR QL STRIP.AUTO: NEGATIVE
KETONES UR STRIP-MCNC: NEGATIVE MG/DL
LEUKOCYTE ESTERASE UR QL STRIP: NEGATIVE
LIPASE SERPL-CCNC: 20 U/L (ref 11–82)
LYMPHOCYTES # BLD AUTO: 4.97 THOUSAND/UL (ref 0.6–4.47)
LYMPHOCYTES # BLD AUTO: 40 % (ref 14–44)
MCH RBC QN AUTO: 28.2 PG (ref 26.8–34.3)
MCHC RBC AUTO-ENTMCNC: 32.7 G/DL (ref 31.4–37.4)
MCV RBC AUTO: 86 FL (ref 82–98)
MONOCYTES # BLD AUTO: 0.35 THOUSAND/UL (ref 0–1.22)
MONOCYTES NFR BLD: 3 % (ref 4–12)
NEUTROPHILS # BLD MANUAL: 6.01 THOUSAND/UL (ref 1.85–7.62)
NEUTS SEG NFR BLD AUTO: 52 % (ref 43–75)
NITRITE UR QL STRIP: NEGATIVE
PH UR STRIP.AUTO: 6.5 [PH]
PLATELET # BLD AUTO: 339 THOUSANDS/UL (ref 149–390)
PLATELET BLD QL SMEAR: ADEQUATE
PMV BLD AUTO: 11.4 FL (ref 8.9–12.7)
POTASSIUM SERPL-SCNC: 4 MMOL/L (ref 3.5–5.3)
PROT SERPL-MCNC: 7.2 G/DL (ref 6.4–8.4)
PROT UR STRIP-MCNC: NEGATIVE MG/DL
RBC # BLD AUTO: 4.86 MILLION/UL (ref 3.81–5.12)
RBC MORPH BLD: NORMAL
SODIUM SERPL-SCNC: 136 MMOL/L (ref 135–147)
SP GR UR STRIP.AUTO: >=1.05 (ref 1–1.03)
UROBILINOGEN UR STRIP-ACNC: <2 MG/DL
VARIANT LYMPHS # BLD AUTO: 3 %
WBC # BLD AUTO: 11.55 THOUSAND/UL (ref 4.31–10.16)

## 2025-03-20 PROCEDURE — 96375 TX/PRO/DX INJ NEW DRUG ADDON: CPT

## 2025-03-20 PROCEDURE — 99214 OFFICE O/P EST MOD 30 MIN: CPT | Performed by: FAMILY MEDICINE

## 2025-03-20 PROCEDURE — 74177 CT ABD & PELVIS W/CONTRAST: CPT

## 2025-03-20 PROCEDURE — 81003 URINALYSIS AUTO W/O SCOPE: CPT

## 2025-03-20 PROCEDURE — 96361 HYDRATE IV INFUSION ADD-ON: CPT

## 2025-03-20 PROCEDURE — 80053 COMPREHEN METABOLIC PANEL: CPT

## 2025-03-20 PROCEDURE — 99284 EMERGENCY DEPT VISIT MOD MDM: CPT

## 2025-03-20 PROCEDURE — 36415 COLL VENOUS BLD VENIPUNCTURE: CPT

## 2025-03-20 PROCEDURE — 83690 ASSAY OF LIPASE: CPT

## 2025-03-20 PROCEDURE — 96374 THER/PROPH/DIAG INJ IV PUSH: CPT

## 2025-03-20 PROCEDURE — 85027 COMPLETE CBC AUTOMATED: CPT

## 2025-03-20 PROCEDURE — 99285 EMERGENCY DEPT VISIT HI MDM: CPT | Performed by: EMERGENCY MEDICINE

## 2025-03-20 PROCEDURE — 85007 BL SMEAR W/DIFF WBC COUNT: CPT

## 2025-03-20 RX ORDER — KETOROLAC TROMETHAMINE 30 MG/ML
15 INJECTION, SOLUTION INTRAMUSCULAR; INTRAVENOUS ONCE
Status: COMPLETED | OUTPATIENT
Start: 2025-03-20 | End: 2025-03-20

## 2025-03-20 RX ORDER — TIRZEPATIDE 15 MG/.5ML
INJECTION, SOLUTION SUBCUTANEOUS
COMMUNITY
Start: 2025-03-03

## 2025-03-20 RX ORDER — HYDROMORPHONE HCL/PF 1 MG/ML
0.5 SYRINGE (ML) INJECTION ONCE
Status: COMPLETED | OUTPATIENT
Start: 2025-03-20 | End: 2025-03-20

## 2025-03-20 RX ORDER — POLYETHYLENE GLYCOL 3350 17 G/17G
17 POWDER, FOR SOLUTION ORAL DAILY
Qty: 68 G | Refills: 0 | Status: SHIPPED | OUTPATIENT
Start: 2025-03-20 | End: 2025-03-24

## 2025-03-20 RX ORDER — NAPROXEN 500 MG/1
500 TABLET ORAL 2 TIMES DAILY WITH MEALS
Qty: 30 TABLET | Refills: 0 | Status: SHIPPED | OUTPATIENT
Start: 2025-03-20

## 2025-03-20 RX ORDER — ONDANSETRON 2 MG/ML
4 INJECTION INTRAMUSCULAR; INTRAVENOUS ONCE
Status: COMPLETED | OUTPATIENT
Start: 2025-03-20 | End: 2025-03-20

## 2025-03-20 RX ORDER — NAPROXEN 500 MG/1
500 TABLET ORAL 2 TIMES DAILY WITH MEALS
Qty: 30 TABLET | Refills: 0 | Status: SHIPPED | OUTPATIENT
Start: 2025-03-20 | End: 2025-03-20

## 2025-03-20 RX ORDER — POLYETHYLENE GLYCOL 3350 17 G/17G
17 POWDER, FOR SOLUTION ORAL DAILY
Qty: 68 G | Refills: 0 | Status: SHIPPED | OUTPATIENT
Start: 2025-03-20 | End: 2025-03-20

## 2025-03-20 RX ORDER — ONDANSETRON 2 MG/ML
4 INJECTION INTRAMUSCULAR; INTRAVENOUS ONCE
Status: CANCELLED | OUTPATIENT
Start: 2025-03-20 | End: 2025-03-20

## 2025-03-20 RX ADMIN — KETOROLAC TROMETHAMINE 15 MG: 30 INJECTION, SOLUTION INTRAMUSCULAR; INTRAVENOUS at 18:47

## 2025-03-20 RX ADMIN — SODIUM CHLORIDE 1000 ML: 0.9 INJECTION, SOLUTION INTRAVENOUS at 17:17

## 2025-03-20 RX ADMIN — HYDROMORPHONE HYDROCHLORIDE 0.5 MG: 1 INJECTION, SOLUTION INTRAMUSCULAR; INTRAVENOUS; SUBCUTANEOUS at 17:17

## 2025-03-20 RX ADMIN — ONDANSETRON 4 MG: 2 INJECTION INTRAMUSCULAR; INTRAVENOUS at 18:15

## 2025-03-20 RX ADMIN — IOHEXOL 100 ML: 350 INJECTION, SOLUTION INTRAVENOUS at 17:36

## 2025-03-20 NOTE — ED PROVIDER NOTES
Time reflects when diagnosis was documented in both MDM as applicable and the Disposition within this note       Time User Action Codes Description Comment    3/20/2025  7:39 PM Nasir Larson Add [R10.31] Right lower quadrant abdominal pain     3/20/2025  7:39 PM Nasir Larson Add [K59.00] Constipation, unspecified constipation type           ED Disposition       ED Disposition   Discharge    Condition   Stable    Date/Time   Thu Mar 20, 2025  7:39 PM    Comment   Keeley Moore discharge to home/self care.                   Assessment & Plan       Medical Decision Making  DDx including but not limited to: appendicitis, gastroenteritis, gastritis, PUD, GERD, gastroparesis, hepatitis, pancreatitis, colitis, enteritis, diverticulitis, food poisoning, mesenteric adenitis, epiploic appendagitis, mesenteric panniculitis, mesenteric ischemia, IBD, IBS, ileus, bowel obstruction, volvulus, internal hernia, AAA, cholecystitis, biliary colic, choledocholithiasis, perforated viscus, tumor, splenic etiology, constipation, pelvic pathology, renal colic, pyelonephritis, UTI; doubt cardiac etiology.     Pt is a 50 y/o female with pmhx of DMII, migraines, OA, htn, hyperlipidemia, presenting from her doctor's office today with severe sharp RLQ abdominal pain radiating to RUQ and across abdomen with guarding and rebound tenderness - intermittent pain is dull. No fevers, chills, n/v/d, focal weakness, rash, dysuria, vaginal discharge, constipation, blood in stool, chest pain, dyspnea. Pain began yesterday that she described as a tightness across her lower abdomen that then developed sharp RLQ abdominal pain. No post-prandial pain, has been afraid to eat as a result however. Last ate around noon today. No prior abdominal surgeries. No recent trauma.     CT a/p: no acute pathology. Labs grossly negative for acute pathology.  Pain improved - after toradol, iv fluids. Counseled pt regarding case, no peritoneal signs. She is  hemodynamically stable, no acute acute distress. Will treat with mirilax, pcp f/u, return precautions. She is agreeable to plan.            Amount and/or Complexity of Data Reviewed  Radiology: ordered.    Risk  Prescription drug management.        ED Course as of 03/20/25 1955   Thu Mar 20, 2025   1836 CT a/p: no acute pathology.   1941 Pain improved. Counseled pt regarding case. She is hemodynamically stable, no acute acute distress. Will treat with mirilax, pcp f/u, return precautions. She is agreeable to plan.       Medications   HYDROmorphone (DILAUDID) injection 0.5 mg (0.5 mg Intravenous Given 3/20/25 1717)   sodium chloride 0.9 % bolus 1,000 mL (0 mL Intravenous Stopped 3/20/25 1817)   iohexol (OMNIPAQUE) 350 MG/ML injection (MULTI-DOSE) 100 mL (100 mL Intravenous Given 3/20/25 1736)   ondansetron (ZOFRAN) injection 4 mg (4 mg Intravenous Given 3/20/25 1815)   ketorolac (TORADOL) injection 15 mg (15 mg Intravenous Given 3/20/25 1847)       ED Risk Strat Scores                                                History of Present Illness       Chief Complaint   Patient presents with    Abdominal Pain     Pt reports abd pain on right side since yesterday. -N/V/D       Past Medical History:   Diagnosis Date    Arthritis     Benign essential hypertension     Resolved 8/16/2016     Diabetes mellitus (HCC)     Diabetes mellitus type 2, controlled (HCC)     Dyspnea and respiratory abnormality     Resolved 2/9/2015     Headache(784.0)     Migraine     Varicella     Vertigo       Past Surgical History:   Procedure Laterality Date    HAND SURGERY  09/2014    KNEE ARTHROPLASTY Right     WISDOM TOOTH EXTRACTION        Family History   Problem Relation Age of Onset    Diabetes unspecified Mother     Diabetes Mother     Lupus Mother     Diabetes type II Mother     Cancer Mother     Heart murmur Father     Diabetes type II Father     Diabetes unspecified Sister     Diabetes Sister     Diabetes unspecified Sister          Started with pregnancy    Cancer Maternal Grandmother         Skin, also maybe breast.   age 80s.    No Known Problems Maternal Grandfather         mild dementia, 80s    Breast cancer Paternal Grandmother         50-60s,  age 92    Cancer Paternal Grandmother     Diabetes unspecified Paternal Grandfather     Arrhythmia Paternal Grandfather     Heart attack Paternal Grandfather     Heart disease Paternal Grandfather          age 70 after pacemaker placement       Social History     Tobacco Use    Smoking status: Never     Passive exposure: Never    Smokeless tobacco: Never   Vaping Use    Vaping status: Never Used   Substance Use Topics    Alcohol use: Yes     Comment: Once in a blue moon    Drug use: No      E-Cigarette/Vaping    E-Cigarette Use Never User       E-Cigarette/Vaping Substances    Nicotine No     THC No     CBD No     Flavoring No     Other No     Unknown No       I have reviewed and agree with the history as documented.     Pt is a 48 y/o female with pmhx of DMII, migraines, OA, htn, hyperlipidemia, presenting from her doctor's office today with severe sharp RLQ abdominal pain radiating to RUQ and across abdomen with guarding and rebound tenderness. No fevers, chills, n/v/d, focal weakness, rash, dysuria, vaginal discharge, constipation, blood in stool, chest pain, dyspnea. Pain began yesterday that she described as a tightness across her lower abdomen that then developed sharp RLQ abdominal pain. No post-prandial pain, has been afraid to eat as a result however. Last ate around noon today. No prior abdominal surgeries. No recent trauma.        Abdominal Pain      Review of Systems   Gastrointestinal:  Positive for abdominal pain.   All other systems reviewed and are negative.          Objective       ED Triage Vitals   Temperature Pulse Blood Pressure Respirations SpO2 Patient Position - Orthostatic VS   25 1641 25 1641 25 1641 25 1641 25 1641 25  1641   97.5 °F (36.4 °C) 75 141/68 20 95 % Sitting      Temp Source Heart Rate Source BP Location FiO2 (%) Pain Score    03/20/25 1641 03/20/25 1641 03/20/25 1641 -- 03/20/25 1717    Oral Monitor Right arm  9      Vitals      Date and Time Temp Pulse SpO2 Resp BP Pain Score FACES Pain Rating User   03/20/25 1847 -- -- -- -- -- 8 -- BC   03/20/25 1815 -- 74 99 % -- 113/58 -- -- HR   03/20/25 1717 -- -- -- -- -- 9 -- SM   03/20/25 1641 97.5 °F (36.4 °C) 75 95 % 20 141/68 -- -- TS            Physical Exam  Vitals and nursing note reviewed.   Constitutional:       General: She is in acute distress.      Appearance: She is well-developed. She is not ill-appearing, toxic-appearing or diaphoretic.   HENT:      Head: Normocephalic and atraumatic.      Mouth/Throat:      Mouth: Mucous membranes are moist.      Pharynx: Oropharynx is clear. No pharyngeal swelling or oropharyngeal exudate.   Eyes:      General: No scleral icterus.     Extraocular Movements: Extraocular movements intact.      Pupils: Pupils are equal, round, and reactive to light.   Cardiovascular:      Rate and Rhythm: Normal rate and regular rhythm.      Heart sounds: Normal heart sounds. No murmur heard.     No friction rub. No gallop.   Pulmonary:      Effort: Pulmonary effort is normal. No respiratory distress.      Breath sounds: Normal breath sounds. No stridor. No wheezing, rhonchi or rales.   Chest:      Chest wall: No tenderness.   Abdominal:      General: Bowel sounds are increased. There is no distension or abdominal bruit. There are no signs of injury.      Palpations: Abdomen is soft. There is no shifting dullness, fluid wave, hepatomegaly, splenomegaly, mass or pulsatile mass.      Tenderness: There is abdominal tenderness in the right lower quadrant. There is guarding. There is no right CVA tenderness, left CVA tenderness or rebound. Negative signs include Camp's sign, Rovsing's sign, McBurney's sign, psoas sign and obturator sign.       Hernia: No hernia is present.   Skin:     General: Skin is warm and dry.      Coloration: Skin is not cyanotic, jaundiced, mottled or pale.      Findings: No erythema or rash.   Neurological:      General: No focal deficit present.      Mental Status: She is alert and oriented to person, place, and time.      Cranial Nerves: No cranial nerve deficit.      Motor: No weakness.   Psychiatric:         Mood and Affect: Mood is anxious.         Behavior: Behavior normal.         Results Reviewed       Procedure Component Value Units Date/Time    UA w Reflex to Microscopic w Reflex to Culture [[id number]]  (Abnormal) Collected: 03/20/25 1901    Lab Status: Final result Specimen: Urine, Clean Catch Updated: 03/20/25 1922     Color, UA Colorless     Clarity, UA Clear     Specific Gravity, UA >=1.050     pH, UA 6.5     Leukocytes, UA Negative     Nitrite, UA Negative     Protein, UA Negative mg/dl      Glucose, UA Negative mg/dl      Ketones, UA Negative mg/dl      Urobilinogen, UA <2.0 mg/dl      Bilirubin, UA Negative     Occult Blood, UA Negative    RBC Morphology Reflex Test [[id number]] Collected: 03/20/25 1645    Lab Status: Final result Specimen: Blood from Arm, Left Updated: 03/20/25 1801    CBC and differential [[id number]]  (Abnormal) Collected: 03/20/25 1645    Lab Status: Final result Specimen: Blood from Arm, Left Updated: 03/20/25 1739     WBC 11.55 Thousand/uL      RBC 4.86 Million/uL      Hemoglobin 13.7 g/dL      Hematocrit 41.9 %      MCV 86 fL      MCH 28.2 pg      MCHC 32.7 g/dL      RDW 14.4 %      MPV 11.4 fL      Platelets 339 Thousands/uL     Narrative:      This is an appended report.  These results have been appended to a previously verified report.    Manual Differential(PHLEBS Do Not Order) [[id number]]  (Abnormal) Collected: 03/20/25 1645    Lab Status: Final result Specimen: Blood from Arm, Left Updated: 03/20/25 1739     Segmented % 52 %      Lymphocytes % 40 %      Monocytes % 3 %      Eosinophils  % 1 %      Basophils % 1 %      Atypical Lymphocytes % 3 %      Absolute Neutrophils 6.01 Thousand/uL      Absolute Lymphocytes 4.97 Thousand/uL      Absolute Monocytes 0.35 Thousand/uL      Absolute Eosinophils 0.12 Thousand/uL      Absolute Basophils 0.12 Thousand/uL      Total Counted --     RBC Morphology Normal     Platelet Estimate Adequate    Comprehensive metabolic panel [[id number]] Collected: 03/20/25 1645    Lab Status: Final result Specimen: Blood from Arm, Left Updated: 03/20/25 1721     Sodium 136 mmol/L      Potassium 4.0 mmol/L      Chloride 106 mmol/L      CO2 22 mmol/L      ANION GAP 8 mmol/L      BUN 12 mg/dL      Creatinine 0.84 mg/dL      Glucose 117 mg/dL      Calcium 9.3 mg/dL      AST 13 U/L      ALT 17 U/L      Alkaline Phosphatase 63 U/L      Total Protein 7.2 g/dL      Albumin 4.1 g/dL      Total Bilirubin 0.61 mg/dL      eGFR 81 ml/min/1.73sq m     Narrative:      National Kid[employer] Disease Foundation guidelines for Chronic Kidney Disease (CKD):     Stage 1 with normal or high GFR (GFR > 90 mL/min/1.73 square meters)    Stage 2 Mild CKD (GFR = 60-89 mL/min/1.73 square meters)    Stage 3A Moderate CKD (GFR = 45-59 mL/min/1.73 square meters)    Stage 3B Moderate CKD (GFR = 30-44 mL/min/1.73 square meters)    Stage 4 Severe CKD (GFR = 15-29 mL/min/1.73 square meters)    Stage 5 End Stage CKD (GFR <15 mL/min/1.73 square meters)  Note: GFR calculation is accurate only with a steady state creatinine    Lipase [[id number]]  (Normal) Collected: 03/20/25 1645    Lab Status: Final result Specimen: Blood from Arm, Left Updated: 03/20/25 1721     Lipase 20 u/L             CT abdomen pelvis w contrast   Final Interpretation by Jeffy Valdez MD (03/20 1827)      No acute abdominopelvic pathology.         Workstation performed: [id number]             Procedures    ED Medication and Procedure Management   Prior to Admission Medications   Prescriptions Last Dose Informant Patient Reported? Taking?    Continuous Blood Gluc  (FreeStyle Shelly Livingston) BLANCHE  Self No No   Sig: Use 1 Units continuous   Continuous Glucose Sensor (FreeStyle Shelly 3 Plus Sensor) MISC   No No   Sig: Use 1 each every 15 days   Insulin Pen Needle (BD Pen Needle Sindhu 2nd Gen) 32G X 4 MM MISC  Self No No   Sig: USE ONE PEN NEEDLE ONCE DAILY   Mounjaro 15 MG/0.5ML SOAJ   Yes No   Sig: INJECT 0.5 ML (15 MG TOTAL) UNDER THE SKIN EVERY 7 DAYSSTRENGTH: 15 MG/0.5ML   Toujeo SoloStar 300 units/mL CONCENTRATED U-300 injection pen (1-unit dial)   No No   Sig: INJECT 15 UNITS UNDER THE SKIN DAILY AT BEDTIME   Patient taking differently: Inject 10 Units under the skin daily at bedtime   ZOLMitriptan (ZOMIG-ZMT) 5 MG disintegrating tablet   No No   Sig: Take 1 tablet (5 mg total) by mouth once as needed for migraine   atorvastatin (LIPITOR) 10 mg tablet   No No   Sig: TAKE 1 TABLET DAILY   ergocalciferol (VITAMIN D2) 50,000 units   No No   Sig: Take 1 capsule (50,000 Units total) by mouth once a week   lisinopril (ZESTRIL) 10 mg tablet   No No   Sig: TAKE 1 TABLET DAILY FOR BLOOD PRESSURE   meclizine (ANTIVERT) 12.5 MG tablet  Self Yes No   Sig: Take by mouth if needed for dizziness   metFORMIN (GLUCOPHAGE) 1000 MG tablet   No No   Sig: TAKE 1 TABLET TWICE A DAY WITH MEALS   naproxen (NAPROSYN) 500 mg tablet  Self No No   Sig: Take 1 tablet (500 mg total) by mouth daily as needed for moderate pain (not with meloxicam)   tirzepatide (Mounjaro) 15 MG/0.5ML  Self No No   Sig: INJECT 0.5 ML (15 MG TOTAL) UNDER THE SKIN EVERY 7 DAYSStrength: 15 MG/0.5ML   Patient not taking: Reported on 3/20/2025      Facility-Administered Medications: None     Patient's Medications   Discharge Prescriptions    NAPROXEN (NAPROSYN) 500 MG TABLET    Take 1 tablet (500 mg total) by mouth 2 (two) times a day with meals       Start Date: 3/20/2025 End Date: --       Order Dose: 500 mg       Quantity: 30 tablet    Refills: 0    POLYETHYLENE GLYCOL (MIRALAX) 17 G PACKET     Take 17 g by mouth daily for 4 days Prn constipation       Start Date: 3/20/2025 End Date: 3/24/2025       Order Dose: 17 g       Quantity: 68 g    Refills: 0     No discharge procedures on file.  ED SEPSIS DOCUMENTATION   Time reflects when diagnosis was documented in both MDM as applicable and the Disposition within this note       Time User Action Codes Description Comment    3/20/2025  7:39 PM Nasir Larson Add [R10.31] Right lower quadrant abdominal pain     3/20/2025  7:39 PM Nasir Larson Add [K59.00] Constipation, unspecified constipation type                  Nasir Larson,   03/20/25 1955

## 2025-03-20 NOTE — Clinical Note
Keeley Moore was seen and treated in our emergency department on 3/20/2025.                Diagnosis:     Keeley  may return to work on return date.    She may return on this date: 03/24/2025         If you have any questions or concerns, please don't hesitate to call.      Nasir Larson, DO    ______________________________           _______________          _______________  Hospital Representative                              Date                                Time

## 2025-03-20 NOTE — ED ATTENDING ATTESTATION
3/20/2025  I, Reza Salcedo MD, saw and evaluated the patient. I have discussed the patient with the resident/non-physician practitioner and agree with the resident's/non-physician practitioner's findings, Plan of Care, and MDM as documented in the resident's/non-physician practitioner's note, except where noted. All available labs and Radiology studies were reviewed.  I was present for key portions of any procedure(s) performed by the resident/non-physician practitioner and I was immediately available to provide assistance.       At this point I agree with the current assessment done in the Emergency Department.  I have conducted an independent evaluation of this patient a history and physical is as follows:    ED Course  ED Course as of 03/20/25 1723   Thu Mar 20, 2025   1723 WBC(!): 11.55         Critical Care Time  Procedures

## 2025-03-20 NOTE — DISCHARGE INSTRUCTIONS
Follow-up with your pcp in the next week, stay well hydrated with water over the next few days and continue mirilax. Please return to the ED with new/worsening symptoms including severe pain, weakness, blood in stool, fevers, vomiting, etc.

## 2025-03-20 NOTE — PROGRESS NOTES
Assessment & Plan  Right upper quadrant abdominal pain  Acute abd pain- R sided, worse RUQ than RLQ  Limited exam secondary to patient discomfort- guarding diffusely over the R abdomen  Referred to ER for further eval   accompanying patient    Orders:  •  Transfer to other facility                   Subjective:     Keeley is a 49 y.o. female here today and has the below chronic conditions:    Patient Active Problem List   Diagnosis   • Type 2 diabetes mellitus with hyperglycemia, without long-term current use of insulin ([facility])   • Hyperlipidemia   • Mild essential hypertension   • Right wrist tendonitis   • Class 3 severe obesity due to excess calories with serious comorbidity and body mass index (BMI) of 40.0 to 44.9 in adult ([facility])   • Primary osteoarthritis of left knee   • Migraines     Current Outpatient Medications   Medication Sig Dispense Refill   • atorvastatin (LIPITOR) 10 mg tablet TAKE 1 TABLET DAILY 90 tablet 1   • Continuous Blood Gluc  (FreeStyle Shelly [place]) BLANCHE Use 1 Units continuous 1 each 0   • Continuous Glucose Sensor (FreeStyle Shelly 3 Plus Sensor) MISC Use 1 each every 15 days 6 each 1   • ergocalciferol (VITAMIN D2) 50,000 units Take 1 capsule (50,000 Units total) by mouth once a week 12 capsule 1   • Insulin Pen Needle (BD Pen Needle Sindhu 2nd Gen) 32G X 4 MM MISC USE ONE PEN NEEDLE ONCE DAILY 100 each 6   • lisinopril (ZESTRIL) 10 mg tablet TAKE 1 TABLET DAILY FOR BLOOD PRESSURE 90 tablet 1   • meclizine (ANTIVERT) 12.5 MG tablet Take by mouth if needed for dizziness     • metFORMIN (GLUCOPHAGE) 1000 MG tablet TAKE 1 TABLET TWICE A DAY WITH MEALS 180 tablet 1   • Mounjaro 15 MG/0.5ML SOAJ INJECT 0.5 ML (15 MG TOTAL) UNDER THE SKIN EVERY 7 DAYSSTRENGTH: 15 MG/0.5ML     • naproxen (NAPROSYN) 500 mg tablet Take 1 tablet (500 mg total) by mouth daily as needed for moderate pain (not with meloxicam)     • Toujeo SoloStar 300 units/mL CONCENTRATED U-300 injection pen (1-unit  dial) INJECT 15 UNITS UNDER THE SKIN DAILY AT BEDTIME (Patient taking differently: Inject 10 Units under the skin daily at bedtime) 4.5 mL 3   • ZOLMitriptan (ZOMIG-ZMT) 5 MG disintegrating tablet Take 1 tablet (5 mg total) by mouth once as needed for migraine 9 tablet 3   • tirzepatide (Mounjaro) 15 MG/0.5ML INJECT 0.5 ML (15 MG TOTAL) UNDER THE SKIN EVERY 7 DAYSStrength: 15 MG/0.5ML (Patient not taking: Reported on 3/20/2025) 6 mL 1     No current facility-administered medications for this visit.          Chief Complaint   Patient presents with   • Abdominal Pain     R sided abd pain around waistline     HPI:  - CC above per clinical staff and reviewed.    History of Present Illness  The patient presents for evaluation of right-sided abdominal pain.    Right-Sided Abdominal Pain  - Sensation of constriction around her waist, akin to wearing tight pants, accompanied by intermittent sharp pains that are now constant.  - Onset of symptoms was noted yesterday, worse after getting up today.  - Pain is predominantly localized on the right side but radiates to the left abdomen.  - Describes the pain as coming in waves and being constant since its onset yesterday  - Experienced temporary relief from the pain last night- better when still.  - Pain recurred shortly after waking  - Reports no associated nausea but admits to a fear of eating due to potential exacerbation of the pain  - Able to consume lunch today without any noticeable change in the intensity of the pain    - Reports no changes in urinary or bowel habits, including frequency, burning sensation, constipation, diarrhea, or blood in stool  - Reports no fevers or vomiting  - Appetite remains intact, but she is reluctant to eat due to the pain  - Pain intensifies with movement and there are no specific activities that alleviate the pain  - Has been increasing her water intake  - Has not experienced similar pain in the past  - Recalls a previous episode of abd pain  "a few months ago when she went to the ER, but notes that this pain is significantly worse.    She notes pain on the car ride on the way to office visit. Bumps in road make it worse.      The following portions of the patient's history were reviewed and updated as appropriate: allergies, current medications, past family history, past medical history, past social history, past surgical history and problem list.    ROS:  Review of Systems   As noted above.    Objective:      /70 (BP Location: Left arm, Patient Position: Sitting, Cuff Size: Large)   Pulse 85   Temp (!) 96.9 °F (36.1 °C) (Tympanic)   Resp 16   Ht 5' 8\" (1.727 m)   Wt 123 kg (271 lb)   LMP 03/03/2025   SpO2 98%   BMI 41.21 kg/m²   BP Readings from Last 3 Encounters:   03/20/25 141/68   03/20/25 100/70   03/03/25 132/88     Wt Readings from Last 3 Encounters:   03/20/25 123 kg (271 lb)   03/03/25 126 kg (277 lb)   02/25/25 124 kg (273 lb 9.6 oz)               Physical Exam:   Physical Exam  Vitals and nursing note reviewed.   Constitutional:       Comments: Appears to be in pain   Cardiovascular:      Rate and Rhythm: Normal rate and regular rhythm.      Heart sounds: No murmur heard.  Pulmonary:      Effort: Pulmonary effort is normal.      Breath sounds: Normal breath sounds. No wheezing or rhonchi.   Abdominal:      Palpations: Abdomen is soft.      Tenderness: There is abdominal tenderness (R abdomen, RUQ>RLQ). There is guarding (R abdomen). There is no right CVA tenderness or left CVA tenderness.      Comments: Pain with position changes   Neurological:      Mental Status: She is alert.                  This office visit note was generated in part with the use of [employer] and/or voice recognition dictation.   "

## 2025-05-08 ENCOUNTER — TELEPHONE (OUTPATIENT)
Age: 50
End: 2025-05-08

## 2025-05-08 NOTE — TELEPHONE ENCOUNTER
Hello,      Please see the below. Thank you        Appointment canceled for Keleey Moore ([id number])   Visit type: OFFICE VISIT SHORT [facility]   5/13/2025 8:30 AM (30 minutes) with Jai Johnson PA-C in [facility]      Reason for cancellation: Canceled via [employer]      Patient comments: I don't need it. Migraines have not increased. I have not been able to try the new medication (never take it in time).

## 2025-05-27 DIAGNOSIS — E55.9 VITAMIN D DEFICIENCY: ICD-10-CM

## 2025-05-27 DIAGNOSIS — E11.65 TYPE 2 DIABETES MELLITUS WITH HYPERGLYCEMIA, WITHOUT LONG-TERM CURRENT USE OF INSULIN (HCC): ICD-10-CM

## 2025-05-28 RX ORDER — HYDROCHLOROTHIAZIDE 12.5 MG/1
CAPSULE ORAL
Qty: 6 EACH | Refills: 1 | Status: SHIPPED | OUTPATIENT
Start: 2025-05-28

## 2025-05-28 RX ORDER — TIRZEPATIDE 15 MG/.5ML
INJECTION, SOLUTION SUBCUTANEOUS
Qty: 6 ML | Refills: 1 | Status: SHIPPED | OUTPATIENT
Start: 2025-05-28

## 2025-05-29 DIAGNOSIS — E11.65 TYPE 2 DIABETES MELLITUS WITH HYPERGLYCEMIA, WITHOUT LONG-TERM CURRENT USE OF INSULIN (HCC): ICD-10-CM

## 2025-05-29 RX ORDER — PEN NEEDLE, DIABETIC 32GX 5/32"
NEEDLE, DISPOSABLE MISCELLANEOUS
Qty: 100 EACH | Refills: 1 | Status: SHIPPED | OUTPATIENT
Start: 2025-05-29

## 2025-06-02 RX ORDER — ERGOCALCIFEROL 1.25 MG/1
50000 CAPSULE, LIQUID FILLED ORAL WEEKLY
Qty: 12 CAPSULE | Refills: 3 | Status: SHIPPED | OUTPATIENT
Start: 2025-06-02

## 2025-06-18 DIAGNOSIS — E78.5 HYPERLIPIDEMIA, UNSPECIFIED HYPERLIPIDEMIA TYPE: ICD-10-CM

## 2025-06-18 DIAGNOSIS — I10 MILD ESSENTIAL HYPERTENSION: ICD-10-CM

## 2025-06-18 RX ORDER — LISINOPRIL 10 MG/1
10 TABLET ORAL DAILY
Qty: 90 TABLET | Refills: 1 | Status: SHIPPED | OUTPATIENT
Start: 2025-06-18

## 2025-06-18 RX ORDER — ATORVASTATIN CALCIUM 10 MG/1
10 TABLET, FILM COATED ORAL DAILY
Qty: 90 TABLET | Refills: 1 | Status: SHIPPED | OUTPATIENT
Start: 2025-06-18

## 2025-06-19 LAB
ALBUMIN/CREAT UR: NORMAL
CREAT UR-MCNC: 87 MG/DL (ref 50–200)
EST. AVERAGE GLUCOSE BLD GHB EST-MCNC: 137 MG/DL
HBA1C MFR BLD: 6.4 %
MICROALBUMIN UR-MCNC: <0.7 MG/DL

## 2025-06-23 ENCOUNTER — OFFICE VISIT (OUTPATIENT)
Dept: ENDOCRINOLOGY | Facility: CLINIC | Age: 50
End: 2025-06-23
Payer: COMMERCIAL

## 2025-06-23 VITALS
DIASTOLIC BLOOD PRESSURE: 78 MMHG | SYSTOLIC BLOOD PRESSURE: 104 MMHG | RESPIRATION RATE: 16 BRPM | BODY MASS INDEX: 41.07 KG/M2 | HEIGHT: 68 IN | HEART RATE: 91 BPM | OXYGEN SATURATION: 96 % | WEIGHT: 271 LBS | TEMPERATURE: 97.9 F

## 2025-06-23 DIAGNOSIS — E11.65 TYPE 2 DIABETES MELLITUS WITH HYPERGLYCEMIA, WITHOUT LONG-TERM CURRENT USE OF INSULIN (HCC): Primary | ICD-10-CM

## 2025-06-23 DIAGNOSIS — E78.2 MIXED HYPERLIPIDEMIA: ICD-10-CM

## 2025-06-23 DIAGNOSIS — E66.813 CLASS 3 SEVERE OBESITY DUE TO EXCESS CALORIES WITH SERIOUS COMORBIDITY AND BODY MASS INDEX (BMI) OF 40.0 TO 44.9 IN ADULT: ICD-10-CM

## 2025-06-23 PROCEDURE — 99214 OFFICE O/P EST MOD 30 MIN: CPT | Performed by: INTERNAL MEDICINE

## 2025-06-23 PROCEDURE — 95251 CONT GLUC MNTR ANALYSIS I&R: CPT | Performed by: INTERNAL MEDICINE

## 2025-06-23 NOTE — PROGRESS NOTES
"    Follow-up Patient Progress Note      CC: Type 2 diabetes, morbid obesity     History of Present Illness:   48-year-old female with type 2 diabetes, HTN, HLD, vitamin D deficiency, morbid obesity BMI 43, DJD, Hirsutism, chronic migraines, suspected ALVARADO and Rt ankle bone spur.  Last visit was 3/3/25.     Since last visit, she lost 6 lbs.     Menstrual history : LMP was 4/21/23.  Appears irregular.  Never had pregnancy.     CGM data review::  Device: Shelly 3  Dates: 6/10/25 - 6/23/25            Usage: 93 %   Av glu: 130 mg/dL  SD:  mg/dL            CV: 24.2  %        GMI: 6.4% TIR: 93 %        TAR: 7 %   TBR: 0  %     Glycemic patters: excellent control. Rare pp hyperglycemia.  Hypoglycemia: No     Current meds: Reported side effects with Jardiance.  Mounjaro 15mg weekly - started 3/12/24  Metformin 1000 mg p.o. twice daily  Toujeo 25 units nightly     Opthamology: Yes  Podiatry: 10/22  vaccination:   Dental:  Pancreatitis: no     Ace/ARB: Lisinopril 10 mg daily  Statin: Lipitor 10 mg nightly  Thyroid issues: No      Physical Exam:  Body mass index is 41.21 kg/m².  /78 (BP Location: Left arm, Patient Position: Sitting)   Pulse 91   Temp 97.9 °F (36.6 °C) (Temporal)   Resp 16   Ht 5' 8\" (1.727 m)   Wt 123 kg (271 lb)   SpO2 96%   BMI 41.21 kg/m²    Vitals:    06/23/25 1425   Weight: 123 kg (271 lb)        Physical Exam  Constitutional:       General: She is not in acute distress.     Appearance: She is well-developed.   HENT:      Head: Normocephalic and atraumatic.      Nose: Nose normal.     Eyes:      Conjunctiva/sclera: Conjunctivae normal.     Pulmonary:      Effort: Pulmonary effort is normal.   Abdominal:      General: There is no distension.     Musculoskeletal:      Cervical back: Normal range of motion and neck supple.     Skin:     Findings: No rash.      Comments: No icterus     Neurological:      Mental Status: She is alert and oriented to person, place, and time.         Labs:   Lab " Results   Component Value Date    HGBA1C 6.4 (H) 06/19/2025       Lab Results   Component Value Date    OEM9LNDAZJLK 2.453 06/03/2023    TSH 2.14 01/18/2021       Lab Results   Component Value Date    CREATININE 0.84 03/20/2025    CREATININE 0.77 03/01/2025    CREATININE 0.67 07/20/2024    BUN 12 03/20/2025    K 4.0 03/20/2025     03/20/2025    CO2 22 03/20/2025     GFR, Calculated   Date Value Ref Range Status   05/23/2019 106 >60 mL/min/1.73m2 Final     Comment:     mL/min per 1.73 square meters                                            Normal Function or Mild Renal    Disease (if clinically at risk):  >or=60  Moderately Decreased:                30-59  Severely Decreased:                  15-29  Renal Failure:                         <15                                            -American GFR: multiply reported GFR by 1.16    Please note that the eGFR is based on the CKD-EPI calculation, and is not intended to be used for drug dosing.                                            Note: Calculated GFR may not be an accurate indicator of renal function if the patient's renal function is not in a steady state.     eGFRcr   Date Value Ref Range Status   03/17/2023 102 >59 Final     eGFR   Date Value Ref Range Status   03/20/2025 81 ml/min/1.73sq m Final       Lab Results   Component Value Date    ALT 17 03/20/2025    AST 13 03/20/2025    ALKPHOS 63 03/20/2025       Lab Results   Component Value Date    CHOLESTEROL 100 03/01/2025    CHOLESTEROL 104 07/20/2024    CHOLESTEROL 107 02/01/2024     Lab Results   Component Value Date    HDL 34 (L) 03/01/2025    HDL 33 (L) 07/20/2024    HDL 37 (L) 02/01/2024     Lab Results   Component Value Date    TRIG 106 03/01/2025    TRIG 108 07/20/2024    TRIG 103 02/01/2024     Lab Results   Component Value Date    NONHDLC 76 10/26/2022         Assessment/Plan:    1. Type 2 diabetes mellitus with hyperglycemia, without long-term current use of insulin ([facility])  Assessment &  Plan:  She is improved with GMI 6.4% and TIR 93%. She has lost 6 lbs. She went back to Toujeo 25u qhs.    Today we discussed options and agreed to continue metformin 1000mg po bid, decrease Toujeo 10u qhs and continue Mounjaro 15mg weekly.  Reiterated diet and lifestyle changes.    Share castro data in 6-8 weeks.  Follow up in 6 months.    Lab Results   Component Value Date    HGBA1C 6.4 (H) 06/19/2025     2. Class 3 severe obesity due to excess calories with serious comorbidity and body mass index (BMI) of 40.0 to 44.9 in adult  Assessment & Plan:  She is stable. She did not lose weight in spite of dose escalation of mounjaro to maximum.  Encouraged medical fitness training - referral placed.    We may consider adding Qysimia or contrave if covered in future.  Goal is weight of 220 lbs over next 6-8 months.  3. Mixed hyperlipidemia        I have spent a total time of  minutes on 06/23/25 in caring for this patient including greater than 50% of this time was spent in counseling/coordination of care as listed above.       Discussed with the patient and all questioned fully answered. She will contact me with concerns.    Perry Trinh

## 2025-06-23 NOTE — ASSESSMENT & PLAN NOTE
She is improved with GMI 6.4% and TIR 93%. She has lost 6 lbs. She went back to Toujeo 25u qhs.    Today we discussed options and agreed to continue metformin 1000mg po bid, decrease Toujeo 10u qhs and continue Mounjaro 15mg weekly.  Reiterated diet and lifestyle changes.    Share castro data in 6-8 weeks.  Follow up in 6 months.    Lab Results   Component Value Date    HGBA1C 6.4 (H) 06/19/2025

## 2025-08-13 ENCOUNTER — TELEPHONE (OUTPATIENT)
Dept: FAMILY MEDICINE CLINIC | Facility: CLINIC | Age: 50
End: 2025-08-13